# Patient Record
Sex: FEMALE | Race: OTHER | HISPANIC OR LATINO | Employment: FULL TIME | URBAN - METROPOLITAN AREA
[De-identification: names, ages, dates, MRNs, and addresses within clinical notes are randomized per-mention and may not be internally consistent; named-entity substitution may affect disease eponyms.]

---

## 2017-04-28 ENCOUNTER — TRANSCRIBE ORDERS (OUTPATIENT)
Dept: ADMINISTRATIVE | Facility: HOSPITAL | Age: 47
End: 2017-04-28

## 2017-04-28 DIAGNOSIS — M75.122 COMPLETE TEAR OF LEFT ROTATOR CUFF: Primary | ICD-10-CM

## 2017-05-03 ENCOUNTER — HOSPITAL ENCOUNTER (OUTPATIENT)
Dept: RADIOLOGY | Facility: HOSPITAL | Age: 47
Discharge: HOME | End: 2017-05-03
Attending: INTERNAL MEDICINE | Admitting: RADIOLOGY
Payer: COMMERCIAL

## 2017-05-03 DIAGNOSIS — M75.122 COMPLETE TEAR OF LEFT ROTATOR CUFF: ICD-10-CM

## 2017-05-03 PROCEDURE — 76881 US COMPL JOINT R-T W/IMG: CPT

## 2017-05-25 ENCOUNTER — HOSPITAL ENCOUNTER (OUTPATIENT)
Dept: RADIOLOGY | Facility: OTHER | Age: 47
Discharge: HOME | End: 2017-05-25
Payer: COMMERCIAL

## 2017-05-25 ENCOUNTER — ALLSCRIPTS OFFICE VISIT (OUTPATIENT)
Dept: OTHER | Facility: OTHER | Age: 47
End: 2017-05-25

## 2017-05-25 DIAGNOSIS — M25.512 PAIN IN LEFT SHOULDER: ICD-10-CM

## 2017-05-25 DIAGNOSIS — M54.12 RADICULOPATHY OF CERVICAL REGION: ICD-10-CM

## 2017-05-25 DIAGNOSIS — M54.2 CERVICALGIA: ICD-10-CM

## 2017-05-25 PROCEDURE — 72050 X-RAY EXAM NECK SPINE 4/5VWS: CPT

## 2017-05-25 PROCEDURE — 73030 X-RAY EXAM OF SHOULDER: CPT

## 2017-07-10 ENCOUNTER — ALLSCRIPTS OFFICE VISIT (OUTPATIENT)
Dept: OTHER | Facility: OTHER | Age: 47
End: 2017-07-10

## 2017-07-10 DIAGNOSIS — M54.12 RADICULOPATHY OF CERVICAL REGION: ICD-10-CM

## 2017-07-10 DIAGNOSIS — M54.16 RADICULOPATHY OF LUMBAR REGION: ICD-10-CM

## 2017-08-02 ENCOUNTER — GENERIC CONVERSION - ENCOUNTER (OUTPATIENT)
Dept: OTHER | Facility: OTHER | Age: 47
End: 2017-08-02

## 2017-08-02 ENCOUNTER — APPOINTMENT (OUTPATIENT)
Dept: PHYSICAL THERAPY | Facility: REHABILITATION | Age: 47
End: 2017-08-02
Payer: COMMERCIAL

## 2017-08-02 PROCEDURE — G8991 OTHER PT/OT GOAL STATUS: HCPCS | Performed by: PHYSICAL THERAPIST

## 2017-08-02 PROCEDURE — G8990 OTHER PT/OT CURRENT STATUS: HCPCS | Performed by: PHYSICAL THERAPIST

## 2017-08-02 PROCEDURE — 97110 THERAPEUTIC EXERCISES: CPT

## 2017-08-02 PROCEDURE — 97162 PT EVAL MOD COMPLEX 30 MIN: CPT

## 2017-08-14 ENCOUNTER — APPOINTMENT (OUTPATIENT)
Dept: PHYSICAL THERAPY | Facility: REHABILITATION | Age: 47
End: 2017-08-14
Payer: COMMERCIAL

## 2017-08-16 ENCOUNTER — APPOINTMENT (OUTPATIENT)
Dept: PHYSICAL THERAPY | Facility: REHABILITATION | Age: 47
End: 2017-08-16
Payer: COMMERCIAL

## 2017-08-16 ENCOUNTER — GENERIC CONVERSION - ENCOUNTER (OUTPATIENT)
Dept: OTHER | Facility: OTHER | Age: 47
End: 2017-08-16

## 2017-08-21 ENCOUNTER — APPOINTMENT (OUTPATIENT)
Dept: PHYSICAL THERAPY | Facility: REHABILITATION | Age: 47
End: 2017-08-21
Payer: COMMERCIAL

## 2017-08-23 ENCOUNTER — APPOINTMENT (OUTPATIENT)
Dept: PHYSICAL THERAPY | Facility: REHABILITATION | Age: 47
End: 2017-08-23
Payer: COMMERCIAL

## 2017-08-28 ENCOUNTER — APPOINTMENT (OUTPATIENT)
Dept: PHYSICAL THERAPY | Facility: REHABILITATION | Age: 47
End: 2017-08-28
Payer: COMMERCIAL

## 2017-08-28 ENCOUNTER — CLINICAL SUPPORT (OUTPATIENT)
Dept: OTHER | Facility: OTHER | Age: 47
End: 2017-08-28

## 2017-08-30 ENCOUNTER — APPOINTMENT (OUTPATIENT)
Dept: PHYSICAL THERAPY | Facility: REHABILITATION | Age: 47
End: 2017-08-30
Payer: COMMERCIAL

## 2017-10-02 ENCOUNTER — GENERIC CONVERSION - ENCOUNTER (OUTPATIENT)
Dept: OTHER | Facility: OTHER | Age: 47
End: 2017-10-02

## 2017-10-25 ENCOUNTER — GENERIC CONVERSION - ENCOUNTER (OUTPATIENT)
Dept: OTHER | Facility: OTHER | Age: 47
End: 2017-10-25

## 2017-12-06 ENCOUNTER — GENERIC CONVERSION - ENCOUNTER (OUTPATIENT)
Dept: OTHER | Facility: OTHER | Age: 47
End: 2017-12-06

## 2018-01-10 NOTE — MISCELLANEOUS
Message   Recorded as Task   Date: 10/25/2017 08:19 AM, Created By: Young West   Task Name: Follow Up   Assigned To: [facility]   Regarding Patient: Maciej Beck, Status: Removed   Comment:    Kira Crawley - 25 Oct 2017 8:19 AM     TASK CREATED    S/P L C3, C4, C5, C6 RFA with AS on 10/24/17  Kira Crawley - 25 Oct 2017 8:20 AM     TASK REMOVED  wrong patient entered  Active Problems    1  Back pain (724 5) (M54 9)   2  Cervicalgia (723 1) (M54 2)   3  Chronic migraine without aura (346 70) (G43 709)   4  Classic migraine with aura (346 00) (G43 109)   5  Greater trochanteric bursitis of left hip (726 5) (M70 62)   6  Hemiplegic migraine (346 30) (G43 409)   7  Left cervical radiculopathy (723 4) (M54 12)   8  Lumbar radiculopathy (724 4) (M54 16)   9  Myofascial pain (729 1) (M79 1)   10  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)   11  Shoulder pain, left (719 41) (M25 512)   12  Vitamin D deficiency (268 9) (E55 9)    Current Meds   1  CloNIDine HCl - 0 1 MG Oral Tablet; Therapy: 66UYW0773 to Recorded   2  Gabapentin 300 MG Oral Capsule; TAKE 1 CAPSULE 3 TIMES DAILY; Therapy: 44DLZ1796 to (Evaluate:76Qzf1744)  Requested for: 22YKU4807; Last   Rx:47Uug2852 Ordered   3  Ketorolac Tromethamine 10 MG Oral Tablet; TID prn, Take at onset of headaches; Therapy: 32XMT7210 to (Last Rx:22Mar2016)  Requested for: 22Mar2016 Ordered   4  Methocarbamol 500 MG Oral Tablet; take 1 tablet tid prn; Therapy: 60BNZ9310 to [phone])  Requested for: 99DQJ0783; Last   Rx:38Icc9317 Ordered   5  Vitamin D3 48751 UNIT Oral Capsule Recorded   6  Vyvanse 50 MG Oral Tablet Chewable; Therapy: 60UDJ8406 to Recorded    Allergies    1  No Known Drug Allergies    2   No Known Environmental Allergies    Signatures   Electronically signed by : Giuseppe Marcelo RN; Oct 25 2017  8:22AM EST                       (Author)

## 2018-01-13 VITALS
WEIGHT: 213.5 LBS | HEART RATE: 72 BPM | BODY MASS INDEX: 35.53 KG/M2 | DIASTOLIC BLOOD PRESSURE: 69 MMHG | SYSTOLIC BLOOD PRESSURE: 103 MMHG

## 2018-01-13 NOTE — MISCELLANEOUS
Message   Recorded as Task   Date: 10/24/2017 02:22 PM, Created By: Jamar Alcaraz   Task Name: Miscellaneous   Assigned To: [facility]   Regarding Patient: Jose Morris, Status: Active   Comment:    Stacy Bell - 24 Oct 2017 2:22 PM     TASK CREATED  pt is doing renovations at her home and cant find her mri scripts  and asking if they can be mailed to pt  please call pt back at [phone] - 24 Oct 2017 2:50 PM     TASK EDITED  **FYI**    S/W pt  Pt stated she needs MRI scripts for lumbar and cervical spine  Pt stated she will  scripts at [address] tomorrow  Advised pt of office hrs  Scripts put at   Ruthy Massa - 24 Oct 2017 3:36 PM     TASK REPLIED TO: Previously Assigned To Ruthy Massa                      The prescriptions reordered  Leonel Weldon - 24 Oct 2017 3:52 PM     TASK EDITED  RN already reprinted scripts that were ordered from 10/20/17 and placed at   Should the orders that were reordered be d/c'd so there is no confusion? Please advise  Ruthy Massa - 24 Oct 2017 4:39 PM     TASK REPLIED TO: Previously Assigned To Ruthy Massa                      I  do not think we need to the see any orders as this may DC the order completely and I do not want this prevented the patient from getting the images done        Active Problems    1  Back pain (724 5) (M54 9)   2  Cervicalgia (723 1) (M54 2)   3  Chronic migraine without aura (346 70) (G43 709)   4  Classic migraine with aura (346 00) (G43 109)   5  Greater trochanteric bursitis of left hip (726 5) (M70 62)   6  Hemiplegic migraine (346 30) (G43 409)   7  Left cervical radiculopathy (723 4) (M54 12)   8  Lumbar radiculopathy (724 4) (M54 16)   9  Myofascial pain (729 1) (M79 1)   10  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)   11  Shoulder pain, left (719 41) (M25 512)   12  Vitamin D deficiency (268 9) (E55 9)    Current Meds   1  CloNIDine HCl - 0 1 MG Oral Tablet;    Therapy: 29UUM5654 to Recorded   2  Gabapentin 300 MG Oral Capsule; TAKE 1 CAPSULE 3 TIMES DAILY; Therapy: 73OID5967 to (Evaluate:00Bjd7132)  Requested for: 90ORS0160; Last   Rx:44Qag5802 Ordered   3  Ketorolac Tromethamine 10 MG Oral Tablet; TID prn, Take at onset of headaches; Therapy: 74EIS2499 to (Last Rx:22Mar2016)  Requested for: 22Mar2016 Ordered   4  Methocarbamol 500 MG Oral Tablet; take 1 tablet tid prn; Therapy: 50ZFC1357 to (Mike Chavarria)  Requested for: 27LBN6713; Last   Rx:57Slk7717 Ordered   5  Vitamin D3 84087 UNIT Oral Capsule Recorded   6  Vyvanse 50 MG Oral Tablet Chewable; Therapy: 31OKJ5075 to Recorded    Allergies    1  No Known Drug Allergies    2   No Known Environmental Allergies    Signatures   Electronically signed by : Kobe Michel RN; Oct 25 2017  7:53AM EST                       (Author)

## 2018-01-14 VITALS
BODY MASS INDEX: 33.51 KG/M2 | WEIGHT: 213.5 LBS | SYSTOLIC BLOOD PRESSURE: 109 MMHG | HEART RATE: 74 BPM | DIASTOLIC BLOOD PRESSURE: 73 MMHG | TEMPERATURE: 99.4 F | HEIGHT: 67 IN

## 2018-01-15 NOTE — MISCELLANEOUS
Signatures   Electronically signed by : Brie Mabry DO; Aug 28 2017  4:07PM EST                       (Author)

## 2018-01-18 NOTE — MISCELLANEOUS
Message   Recorded as Task   Date: 10/25/2017 02:52 PM, Created By: Adia Hensley   Task Name: Miscellaneous   Assigned To: [facility]   Regarding Patient: Pamela Vazquez, Status: Active   Comment:    QuentincindyDayanara stephens - 25 Oct 2017 2:52 PM     TASK CREATED  Pt called stating she will  the script for the MRI on Friday  Pt can be reached at [phone]  Active Problems    1  Back pain (724 5) (M54 9)   2  Cervicalgia (723 1) (M54 2)   3  Chronic migraine without aura (346 70) (G43 709)   4  Classic migraine with aura (346 00) (G43 109)   5  Greater trochanteric bursitis of left hip (726 5) (M70 62)   6  Hemiplegic migraine (346 30) (G43 409)   7  Left cervical radiculopathy (723 4) (M54 12)   8  Lumbar radiculopathy (724 4) (M54 16)   9  Myofascial pain (729 1) (M79 1)   10  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)   11  Shoulder pain, left (719 41) (M25 512)   12  Vitamin D deficiency (268 9) (E55 9)    Current Meds   1  CloNIDine HCl - 0 1 MG Oral Tablet; Therapy: 99DDD8755 to Recorded   2  Gabapentin 300 MG Oral Capsule; TAKE 1 CAPSULE 3 TIMES DAILY; Therapy: 27ZMC7822 to (Evaluate:66Syp4305)  Requested for: 29AYU9632; Last   Rx:27Icn3523 Ordered   3  Ketorolac Tromethamine 10 MG Oral Tablet; TID prn, Take at onset of headaches; Therapy: 03UJH4353 to (Last Rx:22Mar2016)  Requested for: 22Mar2016 Ordered   4  Methocarbamol 500 MG Oral Tablet; take 1 tablet tid prn; Therapy: 41KFM3521 to (Sammy Hughes)  Requested for: 88XJT7710; Last   Rx:68Ccn8711 Ordered   5  Vitamin D3 56879 UNIT Oral Capsule Recorded   6  Vyvanse 50 MG Oral Tablet Chewable; Therapy: 42IVD1271 to Recorded    Allergies    1  No Known Drug Allergies    2   No Known Environmental Allergies    Signatures   Electronically signed by : Geri Hogue RN; Oct 25 2017  3:07PM EST                       (Author)

## 2018-01-22 VITALS
WEIGHT: 204 LBS | SYSTOLIC BLOOD PRESSURE: 116 MMHG | TEMPERATURE: 98.9 F | HEIGHT: 67 IN | DIASTOLIC BLOOD PRESSURE: 76 MMHG | BODY MASS INDEX: 32.02 KG/M2 | HEART RATE: 95 BPM

## 2018-01-23 NOTE — MISCELLANEOUS
Message   Recorded as Task   Date: 12/06/2017 08:06 AM, Created By: Sixto Chavarria   Task Name: Miscellaneous   Assigned To: Sixto Chavarria   Regarding Patient: Alexus Faulkner, Status: Active   CommentAston Ralph - 06 Dec 2017 8:06 AM     TASK CREATED  Patient missed her appt/ 12/6/17 @ 8 AM with Aldo  I called the patient and she stated that she came to the office 12/5/17 & was told her appt  was 12/6/17  She stated that she is going to a different physician because her appts  were changed 3 times  She could not take off of work today, but didn't call to say she would not be coming  Aldo Arango - 06 Dec 2017 10:18 AM     TASK REPLIED TO: Previously Assigned To Aldo Arango  Provider aware  Thank you  Active Problems    1  Back pain (724 5) (M54 9)   2  Cervicalgia (723 1) (M54 2)   3  Chronic migraine without aura (346 70) (G43 709)   4  Classic migraine with aura (346 00) (G43 109)   5  Greater trochanteric bursitis of left hip (726 5) (M70 62)   6  Hemiplegic migraine (346 30) (G43 409)   7  Left cervical radiculopathy (723 4) (M54 12)   8  Lumbar radiculopathy (724 4) (M54 16)   9  Myofascial pain (729 1) (M79 1)   10  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)   11  Shoulder pain, left (719 41) (M25 512)   12  Vitamin D deficiency (268 9) (E55 9)    Current Meds   1  CloNIDine HCl - 0 1 MG Oral Tablet; Therapy: 31HJZ4333 to Recorded   2  Gabapentin 300 MG Oral Capsule; TAKE 1 CAPSULE 3 TIMES DAILY; Therapy: 02KCX3739 to (Evaluate:51Ywp0013)  Requested for: 95PRN4387; Last   Rx:87Kxt6793 Ordered   3  Ketorolac Tromethamine 10 MG Oral Tablet; TID prn, Take at onset of headaches; Therapy: 75HJO5630 to (Last Rx:22Mar2016)  Requested for: 22Mar2016 Ordered   4  Methocarbamol 500 MG Oral Tablet; take 1 tablet tid prn; Therapy: 17WHX4019 to ([id number])  Requested for: 96OPQ9319; Last   Rx:27Ixk4622 Ordered   5  Vitamin D3 46973 UNIT Oral Capsule Recorded   6   Vyvanse 50 MG Oral Tablet Chewable; Therapy: 91KLT3185 to Recorded    Allergies    1  No Known Drug Allergies    2  No Known Environmental Allergies    Signatures   Electronically signed by :  Sherry Chavarria, ; Dec  6 2017 10:51AM EST                       (Author)

## 2018-04-04 RX ORDER — GABAPENTIN 300 MG/1
CAPSULE ORAL
Qty: 90 CAPSULE | Refills: 0 | OUTPATIENT
Start: 2018-04-04

## 2018-04-05 NOTE — TELEPHONE ENCOUNTER
Left MOM on pharmacy's # informing them that the patient has to call us for refills  Left c/b# for questions

## 2020-09-29 DIAGNOSIS — K90.9 INTESTINAL MALABSORPTION, UNSPECIFIED TYPE: Primary | ICD-10-CM

## 2020-09-29 RX ORDER — MULTIVITAMIN WITH FOLIC ACID 400 MCG
TABLET ORAL
Qty: 90 TABLET | Refills: 3 | Status: SHIPPED | OUTPATIENT
Start: 2020-09-29 | End: 2021-11-29

## 2021-01-06 ENCOUNTER — OFFICE VISIT (OUTPATIENT)
Dept: INTERNAL MEDICINE CLINIC | Facility: CLINIC | Age: 51
End: 2021-01-06
Payer: COMMERCIAL

## 2021-01-06 VITALS
WEIGHT: 176 LBS | OXYGEN SATURATION: 98 % | TEMPERATURE: 97.5 F | SYSTOLIC BLOOD PRESSURE: 113 MMHG | BODY MASS INDEX: 29.32 KG/M2 | DIASTOLIC BLOOD PRESSURE: 77 MMHG | HEIGHT: 65 IN | HEART RATE: 72 BPM

## 2021-01-06 DIAGNOSIS — K29.70 GASTRITIS, PRESENCE OF BLEEDING UNSPECIFIED, UNSPECIFIED CHRONICITY, UNSPECIFIED GASTRITIS TYPE: ICD-10-CM

## 2021-01-06 DIAGNOSIS — J45.40 MODERATE PERSISTENT ASTHMA WITHOUT COMPLICATION: ICD-10-CM

## 2021-01-06 DIAGNOSIS — K91.2 POSTOPERATIVE MALABSORPTION: ICD-10-CM

## 2021-01-06 DIAGNOSIS — G43.909 MIGRAINE WITHOUT STATUS MIGRAINOSUS, NOT INTRACTABLE, UNSPECIFIED MIGRAINE TYPE: ICD-10-CM

## 2021-01-06 DIAGNOSIS — Z23 ENCOUNTER FOR IMMUNIZATION: ICD-10-CM

## 2021-01-06 DIAGNOSIS — Z00.01 ENCOUNTER FOR GENERAL ADULT MEDICAL EXAMINATION WITH ABNORMAL FINDINGS: Primary | ICD-10-CM

## 2021-01-06 DIAGNOSIS — M70.62 TROCHANTERIC BURSITIS OF LEFT HIP: ICD-10-CM

## 2021-01-06 DIAGNOSIS — Z12.31 ENCOUNTER FOR SCREENING MAMMOGRAM FOR MALIGNANT NEOPLASM OF BREAST: ICD-10-CM

## 2021-01-06 PROCEDURE — 3725F SCREEN DEPRESSION PERFORMED: CPT | Performed by: INTERNAL MEDICINE

## 2021-01-06 PROCEDURE — 99396 PREV VISIT EST AGE 40-64: CPT | Performed by: INTERNAL MEDICINE

## 2021-01-06 PROCEDURE — 90471 IMMUNIZATION ADMIN: CPT

## 2021-01-06 PROCEDURE — 90715 TDAP VACCINE 7 YRS/> IM: CPT

## 2021-01-06 RX ORDER — BUTALBITAL, ACETAMINOPHEN AND CAFFEINE 50; 325; 40 MG/1; MG/1; MG/1
1 TABLET ORAL EVERY 4 HOURS PRN
COMMUNITY
End: 2021-01-06 | Stop reason: SDUPTHER

## 2021-01-06 RX ORDER — ALBUTEROL SULFATE 90 UG/1
2 AEROSOL, METERED RESPIRATORY (INHALATION) EVERY 6 HOURS PRN
COMMUNITY
End: 2022-04-12

## 2021-01-06 RX ORDER — METOCLOPRAMIDE 10 MG/1
10 TABLET ORAL DAILY PRN
Qty: 30 TABLET | Refills: 0 | Status: SHIPPED | OUTPATIENT
Start: 2021-01-06 | End: 2021-06-18

## 2021-01-06 RX ORDER — PANTOPRAZOLE SODIUM 40 MG/1
40 TABLET, DELAYED RELEASE ORAL
Qty: 30 TABLET | Refills: 5 | Status: SHIPPED | OUTPATIENT
Start: 2021-01-06 | End: 2021-06-15

## 2021-01-06 RX ORDER — ERGOCALCIFEROL 1.25 MG/1
50000 CAPSULE ORAL WEEKLY
COMMUNITY
End: 2021-01-18

## 2021-01-06 RX ORDER — LANOLIN ALCOHOL/MO/W.PET/CERES
CREAM (GRAM) TOPICAL DAILY
COMMUNITY

## 2021-01-06 RX ORDER — METOCLOPRAMIDE 10 MG/1
10 TABLET ORAL DAILY PRN
COMMUNITY
End: 2021-01-06 | Stop reason: SDUPTHER

## 2021-01-06 RX ORDER — BUTALBITAL, ACETAMINOPHEN AND CAFFEINE 50; 325; 40 MG/1; MG/1; MG/1
1 TABLET ORAL EVERY 6 HOURS PRN
Qty: 30 TABLET | Refills: 0 | Status: SHIPPED | OUTPATIENT
Start: 2021-01-06 | End: 2021-01-22 | Stop reason: ALTCHOICE

## 2021-01-06 RX ORDER — CLONIDINE HYDROCHLORIDE 0.1 MG/1
0.1 TABLET ORAL DAILY
COMMUNITY

## 2021-01-06 NOTE — PROGRESS NOTES
Assessment/Plan: This is 53-year-old lady with a history postoperative malabsorption  She also has some upper abdominal pain probably secondary to gastritis  I suggested that she follow a strict lean on also diet excluding caffeine and other ulcerogenic foods  She is going to follow-up with her surgeon tomorrow and discuss this further  Stool tests were also ordered as well as blood test   Tdap vaccine was given  1  Encounter for general adult medical examination with abnormal findings  -     Urinalysis with microscopic  -     Lipid panel; Future  -     TDAP VACCINE GREATER THAN OR EQUAL TO 8YO IM    2  Encounter for screening mammogram for malignant neoplasm of breast    3  Moderate persistent asthma without complication    4  Postoperative malabsorption  -     Vitamin B12; Future  -     Vitamin D 25 hydroxy; Future  -     Iron Saturation %; Future  -     CBC and differential; Future  -     Comprehensive metabolic panel; Future    5  Migraine without status migrainosus, not intractable, unspecified migraine type  -     butalbital-acetaminophen-caffeine (FIORICET,ESGIC) -40 mg per tablet; Take 1 tablet by mouth every 6 (six) hours as needed for headaches  -     metoclopramide (REGLAN) 10 mg tablet; Take 1 tablet (10 mg total) by mouth daily as needed (headache)    6  Gastritis, presence of bleeding unspecified, unspecified chronicity, unspecified gastritis type  -     Occult blood 1-3, stool; Future  -     H  pylori antigen, stool; Future  -     pantoprazole (PROTONIX) 40 mg tablet; Take 1 tablet (40 mg total) by mouth daily before breakfast    7  Trochanteric bursitis of left hip           1  Encounter for general adult medical examination with abnormal findings      2  Encounter for screening mammogram for malignant neoplasm of breast      3  Moderate persistent asthma without complication      4  Postoperative malabsorption      5   Migraine without status migrainosus, not intractable, unspecified migraine type             Subjective:      Patient ID: Qasim Delgado is a 52 y o  female  HPI    The following portions of the patient's history were reviewed and updated as appropriate: She  has a past medical history of Asthma, Gastroduodenitis, Intestinal malabsorption, Migraine, Pyonephrosis, UTI (urinary tract infection), and Vitamin D deficiency  She There are no active problems to display for this patient  She  has a past surgical history that includes Laparoscopic gastric banding (2005) and Gastric bypass (06/2008)  Her family history includes Thyroid disease in her mother  She  has no history on file for tobacco, alcohol, and drug  Current Outpatient Medications   Medication Sig Dispense Refill    albuterol (PROVENTIL HFA,VENTOLIN HFA) 90 mcg/act inhaler Inhale 2 puffs every 6 (six) hours as needed      CALCIUM CITRATE PO Take by mouth      cloNIDine (CATAPRES) 0 1 mg tablet Take 0 1 mg by mouth every 12 (twelve) hours      ergocalciferol (VITAMIN D2) 50,000 units Take 50,000 Units by mouth once a week      Multiple Vitamin (Daily-Isra) TABS TAKE 1 TABLET BY MOUTH EVERY DAY 90 tablet 3    Multiple Vitamins-Minerals (MULTIVITAMIN ADULT PO) Take by mouth      butalbital-acetaminophen-caffeine (FIORICET,ESGIC) -40 mg per tablet Take 1 tablet by mouth every 6 (six) hours as needed for headaches 30 tablet 0    lisdexamfetamine (VYVANSE) 50 MG capsule Take 50 mg by mouth every morning      metoclopramide (REGLAN) 10 mg tablet Take 1 tablet (10 mg total) by mouth daily as needed (headache) 30 tablet 0    pantoprazole (PROTONIX) 40 mg tablet Take 1 tablet (40 mg total) by mouth daily before breakfast 30 tablet 5    traMADol-acetaminophen (ULTRACET) 37 5-325 mg per tablet Take 1 tablet by mouth every 6 (six) hours as needed      vitamin B-12 (VITAMIN B-12) 1,000 mcg tablet Take by mouth daily       No current facility-administered medications for this visit        Current Outpatient Medications on File Prior to Visit   Medication Sig    albuterol (PROVENTIL HFA,VENTOLIN HFA) 90 mcg/act inhaler Inhale 2 puffs every 6 (six) hours as needed    CALCIUM CITRATE PO Take by mouth    cloNIDine (CATAPRES) 0 1 mg tablet Take 0 1 mg by mouth every 12 (twelve) hours    ergocalciferol (VITAMIN D2) 50,000 units Take 50,000 Units by mouth once a week    Multiple Vitamin (Daily-Isra) TABS TAKE 1 TABLET BY MOUTH EVERY DAY    Multiple Vitamins-Minerals (MULTIVITAMIN ADULT PO) Take by mouth    lisdexamfetamine (VYVANSE) 50 MG capsule Take 50 mg by mouth every morning    traMADol-acetaminophen (ULTRACET) 37 5-325 mg per tablet Take 1 tablet by mouth every 6 (six) hours as needed    vitamin B-12 (VITAMIN B-12) 1,000 mcg tablet Take by mouth daily    [DISCONTINUED] butalbital-acetaminophen-caffeine (FIORICET,ESGIC) -40 mg per tablet Take 1 tablet by mouth every 4 (four) hours as needed    [DISCONTINUED] metoclopramide (REGLAN) 10 mg tablet Take 10 mg by mouth daily as needed     No current facility-administered medications on file prior to visit  She has No Known Allergies       Review of Systems   Constitutional: Negative for appetite change, chills, fatigue and fever  HENT: Negative for sore throat and trouble swallowing  Eyes: Negative for redness  Respiratory: Negative for shortness of breath  Cardiovascular: Negative for chest pain and palpitations  Gastrointestinal: Positive for abdominal pain  Negative for constipation and diarrhea  Genitourinary: Negative for dysuria and hematuria  Musculoskeletal: Negative for back pain and neck pain  Left trochanteric pain   Skin: Negative for rash  Neurological: Negative for seizures, weakness and headaches  Hematological: Negative for adenopathy  Psychiatric/Behavioral: Negative for confusion  The patient is not nervous/anxious            Objective:      /77 (BP Location: Left arm, Patient Position: Sitting, Cuff Size: Standard)   Pulse 72   Temp 97 5 °F (36 4 °C) (Temporal)   Ht 5' 5" (1 651 m)   Wt 79 8 kg (176 lb)   SpO2 98%   BMI 29 29 kg/m²     No results found for this or any previous visit (from the past 1344 hour(s))  Physical Exam  Constitutional:       General: She is not in acute distress  Appearance: Normal appearance  HENT:      Head: Normocephalic and atraumatic  Nose: Nose normal       Mouth/Throat:      Mouth: Mucous membranes are moist    Eyes:      Extraocular Movements: Extraocular movements intact  Pupils: Pupils are equal, round, and reactive to light  Neck:      Musculoskeletal: Normal range of motion and neck supple  Cardiovascular:      Rate and Rhythm: Normal rate and regular rhythm  Pulses: Normal pulses  Heart sounds: Normal heart sounds  No murmur  No friction rub  Pulmonary:      Effort: Pulmonary effort is normal  No respiratory distress  Breath sounds: Normal breath sounds  No wheezing  Abdominal:      General: Abdomen is flat  Bowel sounds are normal  There is no distension  Palpations: Abdomen is soft  There is no mass  Tenderness: There is abdominal tenderness  There is no guarding  Musculoskeletal: Normal range of motion  General: Tenderness present  Neurological:      General: No focal deficit present  Mental Status: She is alert and oriented to person, place, and time  Mental status is at baseline  Cranial Nerves: No cranial nerve deficit     Psychiatric:         Mood and Affect: Mood normal          Behavior: Behavior normal

## 2021-01-06 NOTE — PATIENT INSTRUCTIONS
Migraine Headache   WHAT YOU NEED TO KNOW:   What is a migraine headache? A migraine is a severe headache  The pain can be so severe that it interferes with your daily activities  A migraine can last a few hours up to several days  The exact cause of migraines is not known  What can trigger a migraine headache? · Stress, eye strain, oversleeping, or not getting enough sleep    · Hormone changes in women from birth control pills, pregnancy, menopause, or during a monthly period    · Skipping meals, going too long without eating, or not drinking enough liquids    · Certain foods or drinks such as chocolate, hard cheese, red wine, or drinks that contain caffeine    · Foods that contain gluten, nitrates, MSG, or artificial sweeteners    · Sunlight, bright or flashing lights, loud noises, smoke, or strong smells    · Heat, humidity, or changes in the weather    What are the warning signs that a migraine headache is about to start? Warning signs usually start 15 to 60 minutes before the headache:  · Visual changes (auras), such as blurred vision, temporary blind or bright spots, lines, or hallucinations    · Unusual tiredness or frequent yawning    · Tingling in an arm or leg    What are the signs and symptoms of a migraine headache? A migraine headache usually begins as a dull ache around the eye or temple  The pain may get worse with movement  You may also have the following:  · Pain in your head that may increase to the point that you cannot do everyday activities    · Pain on one or both sides of your head    · Throbbing, pulsing, or pounding pain in your head    · Nausea and vomiting    · Sensitivity to light, noise, or smells    How is a migraine headache diagnosed? Your healthcare provider will ask questions about your headaches  Describe the pain and any other symptoms, such as nausea  Tell the provider if you think anything triggered the pain   The provider will also want to know what you ate and drank before the pain started  Tell the provider about any medical conditions you have or that run in your family  Include any recent stressors you have had  You may also need any of the following:  · A neurologic exam  is used to check how your pupils react to light  Your healthcare provider may check your memory, hand grasp, and balance  · CT or MRI pictures  may be taken of your brain  You may be given contrast liquid to help your brain show up better in the pictures  Tell the healthcare provider if you have ever had an allergic reaction to contrast liquid  Do not enter the MRI room with anything metal  Metal can cause serious injury  Tell the healthcare provider if you have any metal in or on your body  How is a migraine headache treated? Migraines cannot be cured  The goal of treatment is to reduce your symptoms  Take medicine as soon as you feel a migraine begin  · Prescription pain medicine  may be given  Do not wait until the pain is severe before you take your medicine  · Migraine medicines  are used to help prevent a migraine or stop it once it starts  · Antinausea medicine  may be given to calm your stomach and to help prevent vomiting  This medicine can also help relieve pain  What can I do to manage my symptoms? · Rest in a dark, quiet room  This will help decrease your pain  Sleep may also help relieve the pain  · Apply ice to decrease pain  Use an ice pack, or put crushed ice in a plastic bag  Cover the ice pack with a towel and place it on your head  Apply ice for 15 to 20 minutes every hour  · Apply heat to decrease pain and muscle spasms  Use a small towel dampened with warm water or a heating pad, or sit in a warm bath  Apply heat on the area for 20 to 30 minutes every 2 hours  You may alternate heat and ice  · Keep a migraine record  Write down when your migraines start and stop  Include your symptoms and what you were doing when a migraine began   Record what you ate or drank for 24 hours before the migraine started  Keep track of what you did to treat your migraine and if it worked  Bring the migraine record with you to visits with your healthcare provider  What can I do to prevent another migraine headache? · Do not smoke  Nicotine and other chemicals in cigarettes and cigars can trigger a migraine or make it worse  Ask your healthcare provider for information if you currently smoke and need help to quit  E-cigarettes or smokeless tobacco still contain nicotine  Talk to your healthcare provider before you use these products  · Do not drink alcohol  Alcohol can trigger a migraine  It can also keep medicines used to treat your migraines from working  · Get regular exercise  Exercise may help prevent migraines  Talk to your healthcare provider about the best exercise plan for you  Try to get at least 30 minutes of exercise on most days  · Manage stress  Stress may trigger a migraine  Learn new ways to relax, such as deep breathing  · Create a sleep schedule  Go to bed and get up at the same times each day  Do not watch television before bed  · Eat regular meals  Include healthy foods such as include fruit, vegetables, whole-grain breads, low-fat dairy products, beans, lean meat, and fish  Do not have food or drinks that trigger your migraines  When should I seek immediate care? · You have a headache that seems different or much worse than your usual migraine headache  · You have a severe headache with a fever or a stiff neck  · You have new problems with speech, vision, balance, or movement  · You feel like you are going to faint, you become confused, or you have a seizure  When should I contact my healthcare provider? · Your migraines interfere with your daily activities  · Your medicines or treatments stop working  · You have questions or concerns about your condition or care      CARE AGREEMENT:   You have the right to help plan your care  Learn about your health condition and how it may be treated  Discuss treatment options with your healthcare providers to decide what care you want to receive  You always have the right to refuse treatment  The above information is an  only  It is not intended as medical advice for individual conditions or treatments  Talk to your doctor, nurse or pharmacist before following any medical regimen to see if it is safe and effective for you  © Copyright [address] Information is for End User's use only and may not be sold, redistributed or otherwise used for commercial purposes   All illustrations and images included in CareNotes® are the copyrighted property of [employer]  or [address] [place] [address]

## 2021-01-07 ENCOUNTER — OFFICE VISIT (OUTPATIENT)
Dept: SURGERY | Facility: CLINIC | Age: 51
End: 2021-01-07
Payer: COMMERCIAL

## 2021-01-07 VITALS
HEIGHT: 65 IN | HEART RATE: 77 BPM | DIASTOLIC BLOOD PRESSURE: 70 MMHG | OXYGEN SATURATION: 98 % | TEMPERATURE: 97.3 F | WEIGHT: 181 LBS | SYSTOLIC BLOOD PRESSURE: 118 MMHG | BODY MASS INDEX: 30.16 KG/M2

## 2021-01-07 DIAGNOSIS — K21.9 GERD (GASTROESOPHAGEAL REFLUX DISEASE): Primary | ICD-10-CM

## 2021-01-07 DIAGNOSIS — Z80.0 FAMILY HISTORY OF COLON CANCER: ICD-10-CM

## 2021-01-07 DIAGNOSIS — Z98.84 HISTORY OF ROUX-EN-Y GASTRIC BYPASS: ICD-10-CM

## 2021-01-07 PROCEDURE — 3008F BODY MASS INDEX DOCD: CPT | Performed by: SURGERY

## 2021-01-07 PROCEDURE — 99244 OFF/OP CNSLTJ NEW/EST MOD 40: CPT | Performed by: SURGERY

## 2021-01-07 NOTE — H&P (VIEW-ONLY)
Assessment/Plan:  I told her to start taking Tums  I am going to schedule her for an EGD and colonoscopy  I am going to send the prescription for bowel prep  This will be done at [facility]  No problem-specific Assessment & Plan notes found for this encounter  Diagnoses and all orders for this visit:    GERD (gastroesophageal reflux disease)    Family history of colon cancer    History of [place]-en-Y gastric bypass          Subjective:      Patient ID: Mei Tristan is a 52 y o  female  62-year-old female patient came to my office with complaints of left-sided epigastric pain and reflux  Patient has history of gastric bypass  Prior to the gastric bypass she had a gastric band  I had done repair of internal hernia 4 years ago  She says she has to have chronic pain which improved after that surgery  She has been having acid reflux like symptoms for last few weeks  She has started taking Tums  She was prescribed pantoprazole by her primary care doctor yesterday however she has not started taking it yet  Patient also says that she has history of colon cancer in her family  She has not had the colonoscopy in many years  She does not complain of melenic stools or change in bowel habits  She has been taking Tums daily with limited improvement in her symptoms  The following portions of the patient's history were reviewed and updated as appropriate:   She  has a past medical history of Asthma, Gastroduodenitis, Intestinal malabsorption, Migraine, Pyonephrosis, UTI (urinary tract infection), and Vitamin D deficiency  She There are no active problems to display for this patient  She  has a past surgical history that includes Laparoscopic gastric banding (2005); Gastric bypass (06/2008); [place] tooth extraction; Dilation and curettage of uterus (2017); and Abdominal surgery    Her family history includes Breast cancer in her family; Colon cancer in her family; Diabetes in her family; Gallbladder disease in her family; Heart disease in her family; Hypertension in her family; Kidney cancer in her family; Lung cancer in her family; Ovarian cancer in her family; Prostate cancer in her family; Stomach cancer in her family; Thyroid disease in her family and mother  She  reports that she has been smoking  She has never used smokeless tobacco  No history on file for alcohol and drug  Current Outpatient Medications   Medication Sig Dispense Refill    albuterol (PROVENTIL HFA,VENTOLIN HFA) 90 mcg/act inhaler Inhale 2 puffs every 6 (six) hours as needed      butalbital-acetaminophen-caffeine (FIORICET,ESGIC) -40 mg per tablet Take 1 tablet by mouth every 6 (six) hours as needed for headaches 30 tablet 0    CALCIUM CITRATE PO Take by mouth      cloNIDine (CATAPRES) 0 1 mg tablet Take 0 1 mg by mouth every 12 (twelve) hours      ergocalciferol (VITAMIN D2) 50,000 units Take 50,000 Units by mouth once a week      lisdexamfetamine (VYVANSE) 50 MG capsule Take 50 mg by mouth every morning      metoclopramide (REGLAN) 10 mg tablet Take 1 tablet (10 mg total) by mouth daily as needed (headache) 30 tablet 0    Multiple Vitamin (Daily-Isra) TABS TAKE 1 TABLET BY MOUTH EVERY DAY 90 tablet 3    vitamin B-12 (VITAMIN B-12) 1,000 mcg tablet Take by mouth daily      pantoprazole (PROTONIX) 40 mg tablet Take 1 tablet (40 mg total) by mouth daily before breakfast (Patient not taking: Reported on 1/7/2021) 30 tablet 5     No current facility-administered medications for this visit        Current Outpatient Medications on File Prior to Visit   Medication Sig    albuterol (PROVENTIL HFA,VENTOLIN HFA) 90 mcg/act inhaler Inhale 2 puffs every 6 (six) hours as needed    butalbital-acetaminophen-caffeine (FIORICET,ESGIC) -40 mg per tablet Take 1 tablet by mouth every 6 (six) hours as needed for headaches    CALCIUM CITRATE PO Take by mouth    cloNIDine (CATAPRES) 0 1 mg tablet Take 0 1 mg by mouth every 12 (twelve) hours    ergocalciferol (VITAMIN D2) 50,000 units Take 50,000 Units by mouth once a week    lisdexamfetamine (VYVANSE) 50 MG capsule Take 50 mg by mouth every morning    metoclopramide (REGLAN) 10 mg tablet Take 1 tablet (10 mg total) by mouth daily as needed (headache)    Multiple Vitamin (Daily-Isra) TABS TAKE 1 TABLET BY MOUTH EVERY DAY    vitamin B-12 (VITAMIN B-12) 1,000 mcg tablet Take by mouth daily    [DISCONTINUED] Multiple Vitamins-Minerals (MULTIVITAMIN ADULT PO) Take by mouth    pantoprazole (PROTONIX) 40 mg tablet Take 1 tablet (40 mg total) by mouth daily before breakfast (Patient not taking: Reported on 1/7/2021)    [DISCONTINUED] traMADol-acetaminophen (ULTRACET) 37 5-325 mg per tablet Take 1 tablet by mouth every 6 (six) hours as needed     No current facility-administered medications on file prior to visit  She has No Known Allergies       Review of Systems   Constitutional: Negative  HENT: Negative  Eyes: Negative  Respiratory: Negative  Cardiovascular: Negative  Gastrointestinal: Positive for abdominal pain  Negative for abdominal distention, anal bleeding, blood in stool, constipation, diarrhea, nausea, rectal pain and vomiting  Acid reflux         Objective:      /70 (BP Location: Right arm, Patient Position: Sitting, Cuff Size: Adult)   Pulse 77   Temp (!) 97 3 °F (36 3 °C) (Tympanic)   Ht 5' 5" (1 651 m)   Wt 82 1 kg (181 lb)   SpO2 98%   BMI 30 12 kg/m²          Physical Exam  Constitutional:       Appearance: She is obese  HENT:      Head: Normocephalic and atraumatic  Mouth/Throat:      Mouth: Mucous membranes are moist    Eyes:      Pupils: Pupils are equal, round, and reactive to light  Cardiovascular:      Rate and Rhythm: Normal rate and regular rhythm  Heart sounds: Normal heart sounds  Pulmonary:      Effort: Pulmonary effort is normal       Breath sounds: Normal breath sounds  Abdominal:      General: Abdomen is flat   A surgical scar is present  Bowel sounds are normal       Palpations: Abdomen is soft  Tenderness: There is abdominal tenderness in the epigastric area  There is no guarding  Negative signs include Whitfield's sign  Hernia: No hernia is present  Skin:     General: Skin is warm  Neurological:      General: No focal deficit present  Mental Status: She is alert     Psychiatric:         Mood and Affect: Mood normal          Behavior: Behavior normal

## 2021-01-11 RX ORDER — POLYETHYLENE GLYCOL-3350 AND ELECTROLYTES 236; 6.74; 5.86; 2.97; 22.74 G/274.31G; G/274.31G; G/274.31G; G/274.31G; G/274.31G
POWDER, FOR SOLUTION ORAL
Qty: 4000 ML | Refills: 0 | Status: SHIPPED | OUTPATIENT
Start: 2021-01-11 | End: 2021-01-22 | Stop reason: ALTCHOICE

## 2021-01-15 LAB
25(OH)D3 SERPL-MCNC: 45 NG/ML (ref 30–100)
ALBUMIN SERPL-MCNC: 3.6 G/DL (ref 3.6–5.1)
ALBUMIN/GLOB SERPL: 1.3 (CALC) (ref 1–2.5)
ALP SERPL-CCNC: 75 U/L (ref 31–125)
ALT SERPL-CCNC: 12 U/L (ref 6–29)
APPEARANCE UR: CLEAR
AST SERPL-CCNC: 17 U/L (ref 10–35)
BACTERIA UR QL AUTO: NORMAL /HPF
BASOPHILS # BLD AUTO: 48 CELLS/UL (ref 0–200)
BASOPHILS NFR BLD AUTO: 1 %
BILIRUB SERPL-MCNC: 0.3 MG/DL (ref 0.2–1.2)
BILIRUB UR QL STRIP: NEGATIVE
BUN SERPL-MCNC: 11 MG/DL (ref 7–25)
BUN/CREAT SERPL: ABNORMAL (CALC) (ref 6–22)
CALCIUM SERPL-MCNC: 8.2 MG/DL (ref 8.6–10.2)
CHLORIDE SERPL-SCNC: 104 MMOL/L (ref 98–110)
CHOLEST SERPL-MCNC: 164 MG/DL
CHOLEST/HDLC SERPL: 2.6 (CALC)
CO2 SERPL-SCNC: 24 MMOL/L (ref 20–32)
COLOR UR: YELLOW
CREAT SERPL-MCNC: 0.62 MG/DL (ref 0.5–1.1)
EOSINOPHIL # BLD AUTO: 91 CELLS/UL (ref 15–500)
EOSINOPHIL NFR BLD AUTO: 1.9 %
ERYTHROCYTE [DISTWIDTH] IN BLOOD BY AUTOMATED COUNT: 15 % (ref 11–15)
FERRITIN SERPL-MCNC: 4 NG/ML (ref 16–232)
GLOBULIN SER CALC-MCNC: 2.8 G/DL (CALC) (ref 1.9–3.7)
GLUCOSE SERPL-MCNC: 91 MG/DL (ref 65–99)
GLUCOSE UR QL STRIP: NEGATIVE
HCT VFR BLD AUTO: 29.2 % (ref 35–45)
HDLC SERPL-MCNC: 64 MG/DL
HGB BLD-MCNC: 9.4 G/DL (ref 11.7–15.5)
HGB UR QL STRIP: NEGATIVE
HYALINE CASTS #/AREA URNS LPF: NORMAL /LPF
IRON SATN MFR SERPL: 11 % (CALC) (ref 16–45)
IRON SERPL-MCNC: 44 MCG/DL (ref 40–190)
KETONES UR QL STRIP: NEGATIVE
LDLC SERPL CALC-MCNC: 84 MG/DL (CALC)
LEUKOCYTE ESTERASE UR QL STRIP: NEGATIVE
LYMPHOCYTES # BLD AUTO: 1258 CELLS/UL (ref 850–3900)
LYMPHOCYTES NFR BLD AUTO: 26.2 %
MCH RBC QN AUTO: 25.8 PG (ref 27–33)
MCHC RBC AUTO-ENTMCNC: 32.2 G/DL (ref 32–36)
MCV RBC AUTO: 80 FL (ref 80–100)
METHYLMALONATE SERPL-SCNC: 111 NMOL/L (ref 87–318)
MONOCYTES # BLD AUTO: 365 CELLS/UL (ref 200–950)
MONOCYTES NFR BLD AUTO: 7.6 %
NEUTROPHILS # BLD AUTO: 3038 CELLS/UL (ref 1500–7800)
NEUTROPHILS NFR BLD AUTO: 63.3 %
NITRITE UR QL STRIP: NEGATIVE
NONHDLC SERPL-MCNC: 100 MG/DL (CALC)
PH UR STRIP: 8 [PH] (ref 5–8)
PLATELET # BLD AUTO: 287 THOUSAND/UL (ref 140–400)
PMV BLD REES-ECKER: 12.9 FL (ref 7.5–12.5)
POTASSIUM SERPL-SCNC: 4.6 MMOL/L (ref 3.5–5.3)
PROT SERPL-MCNC: 6.4 G/DL (ref 6.1–8.1)
PROT UR QL STRIP: NEGATIVE
RBC # BLD AUTO: 3.65 MILLION/UL (ref 3.8–5.1)
RBC #/AREA URNS HPF: NORMAL /HPF
SL AMB EGFR AFRICAN AMERICAN: 124 ML/MIN/1.73M2
SL AMB EGFR NON AFRICAN AMERICAN: 107 ML/MIN/1.73M2
SODIUM SERPL-SCNC: 138 MMOL/L (ref 135–146)
SP GR UR STRIP: 1.02 (ref 1–1.03)
SQUAMOUS #/AREA URNS HPF: NORMAL /HPF
TIBC SERPL-MCNC: 406 MCG/DL (CALC) (ref 250–450)
TRIGL SERPL-MCNC: 75 MG/DL
TSH SERPL-ACNC: 2.99 MIU/L
VIT A SERPL-MCNC: 52 MCG/DL (ref 38–98)
VIT B12 SERPL-MCNC: 297 PG/ML (ref 200–1100)
WBC # BLD AUTO: 4.8 THOUSAND/UL (ref 3.8–10.8)
WBC #/AREA URNS HPF: NORMAL /HPF

## 2021-01-17 DIAGNOSIS — N13.6 PYONEPHROSIS: ICD-10-CM

## 2021-01-18 ENCOUNTER — TELEPHONE (OUTPATIENT)
Dept: SURGERY | Facility: CLINIC | Age: 51
End: 2021-01-18

## 2021-01-18 RX ORDER — ERGOCALCIFEROL 1.25 MG/1
CAPSULE ORAL
Qty: 8 CAPSULE | Refills: 3 | Status: SHIPPED | OUTPATIENT
Start: 2021-01-18 | End: 2021-07-28

## 2021-01-18 NOTE — TELEPHONE ENCOUNTER
Pharmacist called about prescription for Gavilyte G  It's in back order right now, and would like to know if a suitable alternative

## 2021-01-19 ENCOUNTER — ANESTHESIA EVENT (OUTPATIENT)
Dept: GASTROENTEROLOGY | Facility: AMBULARY SURGERY CENTER | Age: 51
End: 2021-01-19

## 2021-01-21 RX ORDER — PROMETHAZINE HYDROCHLORIDE 25 MG/ML
12.5 INJECTION, SOLUTION INTRAMUSCULAR; INTRAVENOUS ONCE AS NEEDED
Status: CANCELLED | OUTPATIENT
Start: 2021-01-21

## 2021-01-21 RX ORDER — HYDROMORPHONE HCL/PF 1 MG/ML
0.5 SYRINGE (ML) INJECTION
Status: CANCELLED | OUTPATIENT
Start: 2021-01-21

## 2021-01-21 RX ORDER — ALBUTEROL SULFATE 2.5 MG/3ML
2.5 SOLUTION RESPIRATORY (INHALATION) ONCE AS NEEDED
Status: CANCELLED | OUTPATIENT
Start: 2021-01-21

## 2021-01-21 RX ORDER — LABETALOL 20 MG/4 ML (5 MG/ML) INTRAVENOUS SYRINGE
5
Status: CANCELLED | OUTPATIENT
Start: 2021-01-21

## 2021-01-21 RX ORDER — MEPERIDINE HYDROCHLORIDE 25 MG/ML
12.5 INJECTION INTRAMUSCULAR; INTRAVENOUS; SUBCUTANEOUS ONCE
Status: CANCELLED | OUTPATIENT
Start: 2021-01-21 | End: 2021-01-21

## 2021-01-21 RX ORDER — ONDANSETRON 2 MG/ML
4 INJECTION INTRAMUSCULAR; INTRAVENOUS ONCE AS NEEDED
Status: CANCELLED | OUTPATIENT
Start: 2021-01-21

## 2021-01-21 RX ORDER — FENTANYL CITRATE/PF 50 MCG/ML
25 SYRINGE (ML) INJECTION
Status: CANCELLED | OUTPATIENT
Start: 2021-01-21

## 2021-01-22 ENCOUNTER — HOSPITAL ENCOUNTER (OUTPATIENT)
Dept: GASTROENTEROLOGY | Facility: AMBULARY SURGERY CENTER | Age: 51
Setting detail: OUTPATIENT SURGERY
Discharge: HOME | End: 2021-01-22
Attending: SURGERY | Admitting: SURGERY
Payer: COMMERCIAL

## 2021-01-22 ENCOUNTER — ANESTHESIA (OUTPATIENT)
Dept: GASTROENTEROLOGY | Facility: AMBULARY SURGERY CENTER | Age: 51
End: 2021-01-22

## 2021-01-22 VITALS
BODY MASS INDEX: 30.16 KG/M2 | TEMPERATURE: 96.8 F | SYSTOLIC BLOOD PRESSURE: 94 MMHG | WEIGHT: 181 LBS | HEART RATE: 75 BPM | OXYGEN SATURATION: 100 % | DIASTOLIC BLOOD PRESSURE: 48 MMHG | HEIGHT: 65 IN | RESPIRATION RATE: 18 BRPM

## 2021-01-22 VITALS — HEART RATE: 81 BPM

## 2021-01-22 DIAGNOSIS — K21.9 GASTROESOPHAGEAL REFLUX DISEASE, UNSPECIFIED WHETHER ESOPHAGITIS PRESENT: ICD-10-CM

## 2021-01-22 DIAGNOSIS — Z80.0 FAMILY HISTORY OF COLON CANCER: ICD-10-CM

## 2021-01-22 PROCEDURE — 88305 TISSUE EXAM BY PATHOLOGIST: CPT | Performed by: PATHOLOGY

## 2021-01-22 PROCEDURE — G0121 COLON CA SCRN NOT HI RSK IND: HCPCS | Performed by: SURGERY

## 2021-01-22 PROCEDURE — 43239 EGD BIOPSY SINGLE/MULTIPLE: CPT | Performed by: SURGERY

## 2021-01-22 RX ORDER — PROPOFOL 10 MG/ML
INJECTION, EMULSION INTRAVENOUS CONTINUOUS PRN
Status: DISCONTINUED | OUTPATIENT
Start: 2021-01-22 | End: 2021-01-26

## 2021-01-22 RX ORDER — FENTANYL CITRATE 50 UG/ML
INJECTION, SOLUTION INTRAMUSCULAR; INTRAVENOUS AS NEEDED
Status: DISCONTINUED | OUTPATIENT
Start: 2021-01-22 | End: 2021-01-26

## 2021-01-22 RX ORDER — LIDOCAINE HYDROCHLORIDE 10 MG/ML
INJECTION, SOLUTION EPIDURAL; INFILTRATION; INTRACAUDAL; PERINEURAL AS NEEDED
Status: DISCONTINUED | OUTPATIENT
Start: 2021-01-22 | End: 2021-01-26

## 2021-01-22 RX ORDER — BUTALBITAL, ACETAMINOPHEN AND CAFFEINE 50; 325; 40 MG/1; MG/1; MG/1
1 TABLET ORAL EVERY 4 HOURS PRN
COMMUNITY
End: 2021-12-19

## 2021-01-22 RX ORDER — LIDOCAINE HYDROCHLORIDE 10 MG/ML
0.5 INJECTION, SOLUTION EPIDURAL; INFILTRATION; INTRACAUDAL; PERINEURAL ONCE AS NEEDED
Status: DISCONTINUED | OUTPATIENT
Start: 2021-01-22 | End: 2021-01-26 | Stop reason: HOSPADM

## 2021-01-22 RX ORDER — PROPOFOL 10 MG/ML
INJECTION, EMULSION INTRAVENOUS AS NEEDED
Status: DISCONTINUED | OUTPATIENT
Start: 2021-01-22 | End: 2021-01-26

## 2021-01-22 RX ORDER — SODIUM CHLORIDE, SODIUM LACTATE, POTASSIUM CHLORIDE, CALCIUM CHLORIDE 600; 310; 30; 20 MG/100ML; MG/100ML; MG/100ML; MG/100ML
125 INJECTION, SOLUTION INTRAVENOUS CONTINUOUS
Status: DISCONTINUED | OUTPATIENT
Start: 2021-01-22 | End: 2021-01-26 | Stop reason: HOSPADM

## 2021-01-22 RX ADMIN — SODIUM CHLORIDE, SODIUM LACTATE, POTASSIUM CHLORIDE, AND CALCIUM CHLORIDE 125 ML/HR: .6; .31; .03; .02 INJECTION, SOLUTION INTRAVENOUS at 13:47

## 2021-01-22 RX ADMIN — PROPOFOL 40 MG: 10 INJECTION, EMULSION INTRAVENOUS at 14:27

## 2021-01-22 RX ADMIN — PROPOFOL 150 MCG/KG/MIN: 10 INJECTION, EMULSION INTRAVENOUS at 14:02

## 2021-01-22 RX ADMIN — LIDOCAINE HYDROCHLORIDE 50 MG: 10 INJECTION, SOLUTION EPIDURAL; INFILTRATION; INTRACAUDAL at 14:01

## 2021-01-22 RX ADMIN — PROPOFOL 50 MG: 10 INJECTION, EMULSION INTRAVENOUS at 14:02

## 2021-01-22 RX ADMIN — FENTANYL CITRATE 25 MCG: 50 INJECTION, SOLUTION INTRAMUSCULAR; INTRAVENOUS at 14:01

## 2021-01-22 NOTE — ANESTHESIA POSTPROCEDURE EVALUATION
Post-Op Assessment Note    CV Status:  Stable    Pain management: adequate     Mental Status:  Sleepy   Hydration Status:  Euvolemic   PONV Controlled:  Controlled   Airway Patency:  Patent      Post Op Vitals Reviewed: Yes      Staff: CRNA         No complications documented      BP   90/50   Temp     Pulse  81   Resp   19   SpO2   98

## 2021-01-22 NOTE — ANESTHESIA PREPROCEDURE EVALUATION
Procedure:  COLONOSCOPY  EGD    Relevant Problems   No relevant active problems        Physical Exam    Airway    Mallampati score: II  TM Distance: >3 FB  Neck ROM: full     Dental       Cardiovascular      Pulmonary      Other Findings        Anesthesia Plan  ASA Score- 2     Anesthesia Type- IV sedation with anesthesia with ASA Monitors  Additional Monitors:   Airway Plan:     Comment: I have seen the patient and reviewed the history  Patient to receive IV sedation with full ASA monitors  Risks discussed with the patient, consent signed          Plan Factors-Exercise tolerance (METS): >4 METS  Chart reviewed  Patient summary reviewed  Induction- intravenous  Postoperative Plan-     Informed Consent- Anesthetic plan and risks discussed with patient  I personally reviewed this patient with the CRNA  Discussed and agreed on the Anesthesia Plan with the CRNA  Jessica Console

## 2021-01-22 NOTE — INTERVAL H&P NOTE
H&P reviewed  After examining the patient I find no changes in the patients condition since the H&P had been written      Vitals:    01/22/21 1333   BP: 108/58   Pulse: 66   Resp: 18   Temp: (!) 96 6 °F (35 9 °C)   SpO2: 97%

## 2021-03-15 ENCOUNTER — TELEPHONE (OUTPATIENT)
Dept: INTERNAL MEDICINE CLINIC | Facility: CLINIC | Age: 51
End: 2021-03-15

## 2021-05-03 ENCOUNTER — OFFICE VISIT (OUTPATIENT)
Dept: INTERNAL MEDICINE CLINIC | Facility: CLINIC | Age: 51
End: 2021-05-03
Payer: COMMERCIAL

## 2021-05-03 VITALS
OXYGEN SATURATION: 100 % | DIASTOLIC BLOOD PRESSURE: 72 MMHG | HEIGHT: 65 IN | HEART RATE: 73 BPM | TEMPERATURE: 97.8 F | WEIGHT: 187 LBS | BODY MASS INDEX: 31.16 KG/M2 | SYSTOLIC BLOOD PRESSURE: 110 MMHG

## 2021-05-03 DIAGNOSIS — K29.70 GASTRITIS, PRESENCE OF BLEEDING UNSPECIFIED, UNSPECIFIED CHRONICITY, UNSPECIFIED GASTRITIS TYPE: ICD-10-CM

## 2021-05-03 DIAGNOSIS — G89.29 CHRONIC LEFT-SIDED LOW BACK PAIN WITHOUT SCIATICA: ICD-10-CM

## 2021-05-03 DIAGNOSIS — G56.02 CARPAL TUNNEL SYNDROME OF LEFT WRIST: ICD-10-CM

## 2021-05-03 DIAGNOSIS — M54.50 CHRONIC LEFT-SIDED LOW BACK PAIN WITHOUT SCIATICA: ICD-10-CM

## 2021-05-03 DIAGNOSIS — J45.40 MODERATE PERSISTENT ASTHMA WITHOUT COMPLICATION: Primary | ICD-10-CM

## 2021-05-03 DIAGNOSIS — D50.9 IRON DEFICIENCY ANEMIA, UNSPECIFIED IRON DEFICIENCY ANEMIA TYPE: ICD-10-CM

## 2021-05-03 DIAGNOSIS — G43.909 MIGRAINE WITHOUT STATUS MIGRAINOSUS, NOT INTRACTABLE, UNSPECIFIED MIGRAINE TYPE: ICD-10-CM

## 2021-05-03 DIAGNOSIS — K91.2 POSTOPERATIVE MALABSORPTION: ICD-10-CM

## 2021-05-03 PROCEDURE — 99215 OFFICE O/P EST HI 40 MIN: CPT | Performed by: INTERNAL MEDICINE

## 2021-05-03 NOTE — PROGRESS NOTES
Assessment/Plan: This is a 63-year-old lady with a history of asthma migraines morbid obesity status post bariatric surgery  She complains of pain in left upper extremity that radiates to the arm  She also has left-sided lower back pain that has been persisting for a while  She has not taken the COVID vaccination and her  has got the 1st dose  It was strongly recommended that she get the vaccine because she will be traveling to [facility] next month  1  Moderate persistent asthma without complication  Comments:    Symptoms are controlled with albuterol  2  Postoperative malabsorption  Comments:    Labs were discussed she will continue supplementation  Orders:  -     CBC and differential; Future  -     Comprehensive metabolic panel; Future    3  Migraine without status migrainosus, not intractable, unspecified migraine type  Comments:    Continue p r n  medication  4  Gastritis, presence of bleeding unspecified, unspecified chronicity, unspecified gastritis type  Comments: This is stable and she had endoscopies done  5  Carpal tunnel syndrome of left wrist  -     EMG 1 Limb; Future    6  Chronic left-sided low back pain without sciatica  Comments:    X-rays were ordered and she was advised to take Tylenol p r n   Orders:  -     XR spine lumbar 2 or 3 views injury; Future; Expected date: 05/03/2021    7  Iron deficiency anemia, unspecified iron deficiency anemia type  -     Iron Saturation %; Future  -     Ferritin; Future           1  Moderate persistent asthma without complication      2  Postoperative malabsorption      3  Migraine without status migrainosus, not intractable, unspecified migraine type      4  Gastritis, presence of bleeding unspecified, unspecified chronicity, unspecified gastritis type             Subjective:      Patient ID: Steven Tia is a 52 y o  female        This is a 63-year-old lady with a history of asthma migraines morbid obesity status post bariatric surgery  She complains of pain in left upper extremity that radiates to the arm  She also has left-sided lower back pain that has been persisting for a while  The following portions of the patient's history were reviewed and updated as appropriate: She  has a past medical history of Asthma, Colon polyp, Gastroduodenitis, Intestinal malabsorption, Migraine, Pyonephrosis, UTI (urinary tract infection), and Vitamin D deficiency  She   Patient Active Problem List    Diagnosis Date Noted    Iron deficiency anemia 05/04/2021    Migraine without status migrainosus, not intractable 05/04/2021    Postoperative malabsorption 05/04/2021    Intestinal malabsorption     Gastroduodenitis     Asthma     Vitamin D deficiency      She  has a past surgical history that includes Laparoscopic gastric banding (2005); Gastric bypass (06/2008); [place] tooth extraction; Dilation and curettage of uterus (2017); Abdominal surgery; and Tubal ligation  Her family history includes Breast cancer in her family; Colon cancer in her family; Diabetes in her family; Gallbladder disease in her family; Heart disease in her family; Hypertension in her family; Kidney cancer in her family; Lung cancer in her family; Ovarian cancer in her family; Prostate cancer in her family; Stomach cancer in her family; Thyroid disease in her family and mother  She  reports that she has been smoking  She has never used smokeless tobacco  She reports current alcohol use  She reports that she does not use drugs    Current Outpatient Medications   Medication Sig Dispense Refill    albuterol (PROVENTIL HFA,VENTOLIN HFA) 90 mcg/act inhaler Inhale 2 puffs every 6 (six) hours as needed      CALCIUM CITRATE PO Take by mouth      cloNIDine (CATAPRES) 0 1 mg tablet Take 0 1 mg by mouth every 12 (twelve) hours      ergocalciferol (VITAMIN D2) 50,000 units 2 CAPSULES ONCE A WEEK 8 capsule 3    lisdexamfetamine (VYVANSE) 50 MG capsule Take 50 mg by mouth every morning      Multiple Vitamin (Daily-Isra) TABS TAKE 1 TABLET BY MOUTH EVERY DAY 90 tablet 3    vitamin B-12 (VITAMIN B-12) 1,000 mcg tablet Take by mouth daily      butalbital-acetaminophen-caffeine (FIORICET,ESGIC) -40 mg per tablet Take 1 tablet by mouth every 4 (four) hours as needed for headaches      metoclopramide (REGLAN) 10 mg tablet Take 1 tablet (10 mg total) by mouth daily as needed (headache) (Patient not taking: Reported on 5/3/2021) 30 tablet 0    pantoprazole (PROTONIX) 40 mg tablet Take 1 tablet (40 mg total) by mouth daily before breakfast (Patient not taking: Reported on 1/7/2021) 30 tablet 5    Plenvu 140 g SOLR        No current facility-administered medications for this visit  Current Outpatient Medications on File Prior to Visit   Medication Sig    albuterol (PROVENTIL HFA,VENTOLIN HFA) 90 mcg/act inhaler Inhale 2 puffs every 6 (six) hours as needed    CALCIUM CITRATE PO Take by mouth    cloNIDine (CATAPRES) 0 1 mg tablet Take 0 1 mg by mouth every 12 (twelve) hours    ergocalciferol (VITAMIN D2) 50,000 units 2 CAPSULES ONCE A WEEK    lisdexamfetamine (VYVANSE) 50 MG capsule Take 50 mg by mouth every morning    Multiple Vitamin (Daily-Isra) TABS TAKE 1 TABLET BY MOUTH EVERY DAY    vitamin B-12 (VITAMIN B-12) 1,000 mcg tablet Take by mouth daily    butalbital-acetaminophen-caffeine (FIORICET,ESGIC) -40 mg per tablet Take 1 tablet by mouth every 4 (four) hours as needed for headaches    metoclopramide (REGLAN) 10 mg tablet Take 1 tablet (10 mg total) by mouth daily as needed (headache) (Patient not taking: Reported on 5/3/2021)    pantoprazole (PROTONIX) 40 mg tablet Take 1 tablet (40 mg total) by mouth daily before breakfast (Patient not taking: Reported on 1/7/2021)    Plenvu 140 g SOLR      No current facility-administered medications on file prior to visit  She has No Known Allergies       Review of Systems   Constitutional: Negative for appetite change, chills, fatigue and fever  HENT: Negative for sore throat and trouble swallowing  Eyes: Negative for redness  Respiratory: Negative for shortness of breath  Cardiovascular: Negative for chest pain and palpitations  Gastrointestinal: Negative for abdominal pain, constipation and diarrhea  Endocrine: Negative for cold intolerance  Genitourinary: Negative for dysuria and hematuria  Musculoskeletal: Positive for back pain  Negative for neck pain  Left UE pain   Skin: Negative for rash  Allergic/Immunologic: Positive for environmental allergies  Neurological: Negative for seizures, weakness and headaches  Hematological: Negative for adenopathy  Psychiatric/Behavioral: Negative for confusion  The patient is not nervous/anxious  Objective:      /72 (BP Location: Left arm, Patient Position: Sitting, Cuff Size: Standard)   Pulse 73   Temp 97 8 °F (36 6 °C) (Temporal)   Ht 5' 5" (1 651 m)   Wt 84 8 kg (187 lb)   SpO2 100%   BMI 31 12 kg/m²     No results found for this or any previous visit (from the past 1344 hour(s))  Physical Exam  Constitutional:       General: She is not in acute distress  Appearance: Normal appearance  HENT:      Head: Normocephalic and atraumatic  Right Ear: Tympanic membrane normal       Left Ear: Tympanic membrane normal       Nose: Nose normal       Mouth/Throat:      Mouth: Mucous membranes are moist    Eyes:      Extraocular Movements: Extraocular movements intact  Pupils: Pupils are equal, round, and reactive to light  Neck:      Musculoskeletal: Normal range of motion and neck supple  Cardiovascular:      Rate and Rhythm: Normal rate and regular rhythm  Pulses: Normal pulses  Heart sounds: Normal heart sounds  No murmur  No friction rub  Pulmonary:      Effort: Pulmonary effort is normal  No respiratory distress  Breath sounds: Normal breath sounds  No wheezing     Abdominal:      General: Abdomen is flat  Bowel sounds are normal  There is no distension  Palpations: Abdomen is soft  There is no mass  Tenderness: There is no abdominal tenderness  There is no guarding  Musculoskeletal: Normal range of motion  General: Tenderness present  Comments: Tenderness in the left wrist carpal tunnel area was appreciated  Left lumbar area tenderness present  Skin:     General: Skin is warm and dry  Neurological:      General: No focal deficit present  Mental Status: She is alert and oriented to person, place, and time  Mental status is at baseline  Cranial Nerves: No cranial nerve deficit  Psychiatric:         Mood and Affect: Mood normal          Behavior: Behavior normal        BMI Counseling: Body mass index is 31 12 kg/m²  The BMI is above normal  Nutrition recommendations include reducing portion sizes, decreasing overall calorie intake and 3-5 servings of fruits/vegetables daily

## 2021-05-04 PROBLEM — K91.2 POSTOPERATIVE MALABSORPTION: Status: ACTIVE | Noted: 2021-05-04

## 2021-05-04 PROBLEM — D50.9 IRON DEFICIENCY ANEMIA: Status: ACTIVE | Noted: 2021-05-04

## 2021-05-04 PROBLEM — G43.909 MIGRAINE WITHOUT STATUS MIGRAINOSUS, NOT INTRACTABLE: Status: ACTIVE | Noted: 2021-05-04

## 2021-05-15 LAB
ALBUMIN SERPL-MCNC: 3.7 G/DL (ref 3.6–5.1)
ALBUMIN/GLOB SERPL: 1.4 (CALC) (ref 1–2.5)
ALP SERPL-CCNC: 67 U/L (ref 31–125)
ALT SERPL-CCNC: 12 U/L (ref 6–29)
AST SERPL-CCNC: 16 U/L (ref 10–35)
BASOPHILS # BLD AUTO: 41 CELLS/UL (ref 0–200)
BASOPHILS NFR BLD AUTO: 0.8 %
BILIRUB SERPL-MCNC: 0.4 MG/DL (ref 0.2–1.2)
BUN SERPL-MCNC: 8 MG/DL (ref 7–25)
BUN/CREAT SERPL: NORMAL (CALC) (ref 6–22)
CALCIUM SERPL-MCNC: 8.6 MG/DL (ref 8.6–10.2)
CHLORIDE SERPL-SCNC: 106 MMOL/L (ref 98–110)
CO2 SERPL-SCNC: 29 MMOL/L (ref 20–32)
CREAT SERPL-MCNC: 0.69 MG/DL (ref 0.5–1.1)
EOSINOPHIL # BLD AUTO: 61 CELLS/UL (ref 15–500)
EOSINOPHIL NFR BLD AUTO: 1.2 %
ERYTHROCYTE [DISTWIDTH] IN BLOOD BY AUTOMATED COUNT: 14.2 % (ref 11–15)
FERRITIN SERPL-MCNC: 4 NG/ML (ref 16–232)
GLOBULIN SER CALC-MCNC: 2.6 G/DL (CALC) (ref 1.9–3.7)
GLUCOSE SERPL-MCNC: 88 MG/DL (ref 65–99)
HCT VFR BLD AUTO: 30.7 % (ref 35–45)
HGB BLD-MCNC: 9.2 G/DL (ref 11.7–15.5)
IRON SATN MFR SERPL: 6 % (CALC) (ref 16–45)
IRON SERPL-MCNC: 29 MCG/DL (ref 40–190)
LYMPHOCYTES # BLD AUTO: 1790 CELLS/UL (ref 850–3900)
LYMPHOCYTES NFR BLD AUTO: 35.1 %
MCH RBC QN AUTO: 24.1 PG (ref 27–33)
MCHC RBC AUTO-ENTMCNC: 30 G/DL (ref 32–36)
MCV RBC AUTO: 80.4 FL (ref 80–100)
MONOCYTES # BLD AUTO: 485 CELLS/UL (ref 200–950)
MONOCYTES NFR BLD AUTO: 9.5 %
NEUTROPHILS # BLD AUTO: 2723 CELLS/UL (ref 1500–7800)
NEUTROPHILS NFR BLD AUTO: 53.4 %
PLATELET # BLD AUTO: 306 THOUSAND/UL (ref 140–400)
PMV BLD REES-ECKER: 12.3 FL (ref 7.5–12.5)
POTASSIUM SERPL-SCNC: 4.5 MMOL/L (ref 3.5–5.3)
PROT SERPL-MCNC: 6.3 G/DL (ref 6.1–8.1)
RBC # BLD AUTO: 3.82 MILLION/UL (ref 3.8–5.1)
SL AMB EGFR AFRICAN AMERICAN: 119 ML/MIN/1.73M2
SL AMB EGFR NON AFRICAN AMERICAN: 103 ML/MIN/1.73M2
SODIUM SERPL-SCNC: 138 MMOL/L (ref 135–146)
TIBC SERPL-MCNC: 463 MCG/DL (CALC) (ref 250–450)
WBC # BLD AUTO: 5.1 THOUSAND/UL (ref 3.8–10.8)

## 2021-06-04 ENCOUNTER — TELEPHONE (OUTPATIENT)
Dept: INTERNAL MEDICINE CLINIC | Facility: CLINIC | Age: 51
End: 2021-06-04

## 2021-06-04 DIAGNOSIS — N30.00 ACUTE CYSTITIS WITHOUT HEMATURIA: Primary | ICD-10-CM

## 2021-06-04 NOTE — TELEPHONE ENCOUNTER
She is having kidney pain, made an appt  For Monday  Would like to know if you can order tests so she can have them done on Saturday to see if she has an infection

## 2021-06-14 ENCOUNTER — APPOINTMENT (EMERGENCY)
Dept: RADIOLOGY | Facility: HOSPITAL | Age: 51
DRG: 872 | End: 2021-06-14
Payer: COMMERCIAL

## 2021-06-14 ENCOUNTER — HOSPITAL ENCOUNTER (INPATIENT)
Facility: HOSPITAL | Age: 51
LOS: 2 days | Discharge: HOME | DRG: 872 | End: 2021-06-17
Attending: EMERGENCY MEDICINE | Admitting: INTERNAL MEDICINE
Payer: COMMERCIAL

## 2021-06-14 DIAGNOSIS — R07.9 CHEST PAIN: ICD-10-CM

## 2021-06-14 DIAGNOSIS — K29.90 GASTRODUODENITIS: ICD-10-CM

## 2021-06-14 DIAGNOSIS — N12 PYELONEPHRITIS: Primary | ICD-10-CM

## 2021-06-14 DIAGNOSIS — D50.9 IRON DEFICIENCY ANEMIA, UNSPECIFIED IRON DEFICIENCY ANEMIA TYPE: ICD-10-CM

## 2021-06-14 DIAGNOSIS — R10.9 BILATERAL FLANK PAIN: ICD-10-CM

## 2021-06-14 DIAGNOSIS — R00.0 TACHYCARDIA: ICD-10-CM

## 2021-06-14 LAB
ALBUMIN SERPL BCP-MCNC: 3.4 G/DL (ref 3.5–5)
ALP SERPL-CCNC: 87 U/L (ref 46–116)
ALT SERPL W P-5'-P-CCNC: 14 U/L (ref 12–78)
ANION GAP SERPL CALCULATED.3IONS-SCNC: 7 MMOL/L (ref 4–13)
AST SERPL W P-5'-P-CCNC: 11 U/L (ref 5–45)
BASOPHILS # BLD AUTO: 0.03 THOUSANDS/ÂΜL (ref 0–0.1)
BASOPHILS NFR BLD AUTO: 0 % (ref 0–1)
BILIRUB SERPL-MCNC: 0.52 MG/DL (ref 0.2–1)
BUN SERPL-MCNC: 7 MG/DL (ref 5–25)
CALCIUM ALBUM COR SERPL-MCNC: 9.4 MG/DL (ref 8.3–10.1)
CALCIUM SERPL-MCNC: 8.9 MG/DL (ref 8.3–10.1)
CHLORIDE SERPL-SCNC: 104 MMOL/L (ref 100–108)
CO2 SERPL-SCNC: 21 MMOL/L (ref 21–32)
CREAT SERPL-MCNC: 0.7 MG/DL (ref 0.6–1.3)
EOSINOPHIL # BLD AUTO: 0 THOUSAND/ÂΜL (ref 0–0.61)
EOSINOPHIL NFR BLD AUTO: 0 % (ref 0–6)
ERYTHROCYTE [DISTWIDTH] IN BLOOD BY AUTOMATED COUNT: 15.9 % (ref 11.6–15.1)
GFR SERPL CREATININE-BSD FRML MDRD: 103 ML/MIN/1.73SQ M
GLUCOSE SERPL-MCNC: 129 MG/DL (ref 65–140)
HCG SERPL QL: NEGATIVE
HCT VFR BLD AUTO: 30.3 % (ref 34.8–46.1)
HGB BLD-MCNC: 9.4 G/DL (ref 11.5–15.4)
IMM GRANULOCYTES # BLD AUTO: 0.13 THOUSAND/UL (ref 0–0.2)
IMM GRANULOCYTES NFR BLD AUTO: 1 % (ref 0–2)
LACTATE SERPL-SCNC: 1.4 MMOL/L (ref 0.5–2)
LYMPHOCYTES # BLD AUTO: 1.05 THOUSANDS/ÂΜL (ref 0.6–4.47)
LYMPHOCYTES NFR BLD AUTO: 6 % (ref 14–44)
MCH RBC QN AUTO: 23.7 PG (ref 26.8–34.3)
MCHC RBC AUTO-ENTMCNC: 31 G/DL (ref 31.4–37.4)
MCV RBC AUTO: 77 FL (ref 82–98)
MONOCYTES # BLD AUTO: 0.97 THOUSAND/ÂΜL (ref 0.17–1.22)
MONOCYTES NFR BLD AUTO: 5 % (ref 4–12)
NEUTROPHILS # BLD AUTO: 16.19 THOUSANDS/ÂΜL (ref 1.85–7.62)
NEUTS SEG NFR BLD AUTO: 88 % (ref 43–75)
NRBC BLD AUTO-RTO: 0 /100 WBCS
PLATELET # BLD AUTO: 254 THOUSANDS/UL (ref 149–390)
PMV BLD AUTO: 11.4 FL (ref 8.9–12.7)
POTASSIUM SERPL-SCNC: 3.6 MMOL/L (ref 3.5–5.3)
PROCALCITONIN SERPL-MCNC: <0.05 NG/ML
PROT SERPL-MCNC: 7.5 G/DL (ref 6.4–8.2)
RBC # BLD AUTO: 3.96 MILLION/UL (ref 3.81–5.12)
SODIUM SERPL-SCNC: 132 MMOL/L (ref 136–145)
TROPONIN I SERPL-MCNC: <0.02 NG/ML
WBC # BLD AUTO: 18.37 THOUSAND/UL (ref 4.31–10.16)

## 2021-06-14 PROCEDURE — 93005 ELECTROCARDIOGRAM TRACING: CPT

## 2021-06-14 PROCEDURE — 71275 CT ANGIOGRAPHY CHEST: CPT

## 2021-06-14 PROCEDURE — 80053 COMPREHEN METABOLIC PANEL: CPT | Performed by: EMERGENCY MEDICINE

## 2021-06-14 PROCEDURE — 84484 ASSAY OF TROPONIN QUANT: CPT | Performed by: EMERGENCY MEDICINE

## 2021-06-14 PROCEDURE — 74174 CTA ABD&PLVS W/CONTRAST: CPT

## 2021-06-14 PROCEDURE — 99285 EMERGENCY DEPT VISIT HI MDM: CPT | Performed by: EMERGENCY MEDICINE

## 2021-06-14 PROCEDURE — 71045 X-RAY EXAM CHEST 1 VIEW: CPT

## 2021-06-14 PROCEDURE — 96375 TX/PRO/DX INJ NEW DRUG ADDON: CPT

## 2021-06-14 PROCEDURE — 85025 COMPLETE CBC W/AUTO DIFF WBC: CPT | Performed by: EMERGENCY MEDICINE

## 2021-06-14 PROCEDURE — 36415 COLL VENOUS BLD VENIPUNCTURE: CPT

## 2021-06-14 PROCEDURE — 83605 ASSAY OF LACTIC ACID: CPT | Performed by: EMERGENCY MEDICINE

## 2021-06-14 PROCEDURE — 87040 BLOOD CULTURE FOR BACTERIA: CPT | Performed by: EMERGENCY MEDICINE

## 2021-06-14 PROCEDURE — 96365 THER/PROPH/DIAG IV INF INIT: CPT

## 2021-06-14 PROCEDURE — 99285 EMERGENCY DEPT VISIT HI MDM: CPT

## 2021-06-14 PROCEDURE — 84145 PROCALCITONIN (PCT): CPT | Performed by: EMERGENCY MEDICINE

## 2021-06-14 PROCEDURE — 84703 CHORIONIC GONADOTROPIN ASSAY: CPT | Performed by: EMERGENCY MEDICINE

## 2021-06-14 RX ORDER — SODIUM CHLORIDE, SODIUM GLUCONATE, SODIUM ACETATE, POTASSIUM CHLORIDE, MAGNESIUM CHLORIDE, SODIUM PHOSPHATE, DIBASIC, AND POTASSIUM PHOSPHATE .53; .5; .37; .037; .03; .012; .00082 G/100ML; G/100ML; G/100ML; G/100ML; G/100ML; G/100ML; G/100ML
1000 INJECTION, SOLUTION INTRAVENOUS ONCE
Status: COMPLETED | OUTPATIENT
Start: 2021-06-14 | End: 2021-06-14

## 2021-06-14 RX ORDER — HYDROMORPHONE HCL/PF 1 MG/ML
0.5 SYRINGE (ML) INJECTION ONCE
Status: COMPLETED | OUTPATIENT
Start: 2021-06-14 | End: 2021-06-14

## 2021-06-14 RX ADMIN — IOHEXOL 100 ML: 350 INJECTION, SOLUTION INTRAVENOUS at 23:39

## 2021-06-14 RX ADMIN — SODIUM CHLORIDE, SODIUM GLUCONATE, SODIUM ACETATE, POTASSIUM CHLORIDE, MAGNESIUM CHLORIDE, SODIUM PHOSPHATE, DIBASIC, AND POTASSIUM PHOSPHATE 1000 ML: .53; .5; .37; .037; .03; .012; .00082 INJECTION, SOLUTION INTRAVENOUS at 22:17

## 2021-06-14 RX ADMIN — HYDROMORPHONE HYDROCHLORIDE 0.5 MG: 1 INJECTION, SOLUTION INTRAMUSCULAR; INTRAVENOUS; SUBCUTANEOUS at 22:58

## 2021-06-15 PROBLEM — A41.9 SEPSIS (HCC): Status: ACTIVE | Noted: 2021-06-15

## 2021-06-15 PROBLEM — I95.9 HYPOTENSION: Status: ACTIVE | Noted: 2021-06-15

## 2021-06-15 PROBLEM — N12 PYELONEPHRITIS: Status: ACTIVE | Noted: 2021-06-15

## 2021-06-15 LAB
ANION GAP SERPL CALCULATED.3IONS-SCNC: 6 MMOL/L (ref 4–13)
ATRIAL RATE: 109 BPM
BACTERIA UR QL AUTO: ABNORMAL /HPF
BASOPHILS # BLD AUTO: 0.03 THOUSANDS/ÂΜL (ref 0–0.1)
BASOPHILS NFR BLD AUTO: 0 % (ref 0–1)
BILIRUB UR QL STRIP: NEGATIVE
BUN SERPL-MCNC: 7 MG/DL (ref 5–25)
CALCIUM SERPL-MCNC: 7.9 MG/DL (ref 8.3–10.1)
CHLORIDE SERPL-SCNC: 106 MMOL/L (ref 100–108)
CLARITY UR: CLEAR
CLARITY, POC: NORMAL
CO2 SERPL-SCNC: 25 MMOL/L (ref 21–32)
COLOR UR: YELLOW
COLOR, POC: YELLOW
CREAT SERPL-MCNC: 0.62 MG/DL (ref 0.6–1.3)
EOSINOPHIL # BLD AUTO: 0.01 THOUSAND/ÂΜL (ref 0–0.61)
EOSINOPHIL NFR BLD AUTO: 0 % (ref 0–6)
ERYTHROCYTE [DISTWIDTH] IN BLOOD BY AUTOMATED COUNT: 16.1 % (ref 11.6–15.1)
GFR SERPL CREATININE-BSD FRML MDRD: 107 ML/MIN/1.73SQ M
GLUCOSE SERPL-MCNC: 114 MG/DL (ref 65–140)
GLUCOSE UR STRIP-MCNC: NEGATIVE MG/DL
HCT VFR BLD AUTO: 26.7 % (ref 34.8–46.1)
HGB BLD-MCNC: 8 G/DL (ref 11.5–15.4)
HGB UR QL STRIP.AUTO: ABNORMAL
HYALINE CASTS #/AREA URNS LPF: ABNORMAL /LPF
IMM GRANULOCYTES # BLD AUTO: 0.08 THOUSAND/UL (ref 0–0.2)
IMM GRANULOCYTES NFR BLD AUTO: 1 % (ref 0–2)
KETONES UR STRIP-MCNC: ABNORMAL MG/DL
LEUKOCYTE ESTERASE UR QL STRIP: ABNORMAL
LYMPHOCYTES # BLD AUTO: 1.25 THOUSANDS/ÂΜL (ref 0.6–4.47)
LYMPHOCYTES NFR BLD AUTO: 8 % (ref 14–44)
MCH RBC QN AUTO: 23.3 PG (ref 26.8–34.3)
MCHC RBC AUTO-ENTMCNC: 30 G/DL (ref 31.4–37.4)
MCV RBC AUTO: 78 FL (ref 82–98)
MONOCYTES # BLD AUTO: 0.94 THOUSAND/ÂΜL (ref 0.17–1.22)
MONOCYTES NFR BLD AUTO: 6 % (ref 4–12)
NEUTROPHILS # BLD AUTO: 14.01 THOUSANDS/ÂΜL (ref 1.85–7.62)
NEUTS SEG NFR BLD AUTO: 85 % (ref 43–75)
NITRITE UR QL STRIP: NEGATIVE
NON-SQ EPI CELLS URNS QL MICRO: ABNORMAL /HPF
NRBC BLD AUTO-RTO: 0 /100 WBCS
P AXIS: 62 DEGREES
PH UR STRIP.AUTO: 7 [PH] (ref 4.5–8)
PLATELET # BLD AUTO: 191 THOUSANDS/UL (ref 149–390)
PMV BLD AUTO: 11.5 FL (ref 8.9–12.7)
POTASSIUM SERPL-SCNC: 3.5 MMOL/L (ref 3.5–5.3)
PR INTERVAL: 124 MS
PROCALCITONIN SERPL-MCNC: 0.06 NG/ML
PROT UR STRIP-MCNC: NEGATIVE MG/DL
QRS AXIS: 61 DEGREES
QRSD INTERVAL: 82 MS
QT INTERVAL: 308 MS
QTC INTERVAL: 414 MS
RBC # BLD AUTO: 3.43 MILLION/UL (ref 3.81–5.12)
RBC #/AREA URNS AUTO: ABNORMAL /HPF
SODIUM SERPL-SCNC: 137 MMOL/L (ref 136–145)
SP GR UR STRIP.AUTO: 1.01 (ref 1–1.03)
T WAVE AXIS: 36 DEGREES
UROBILINOGEN UR QL STRIP.AUTO: 1 E.U./DL
VENTRICULAR RATE: 109 BPM
WBC # BLD AUTO: 16.32 THOUSAND/UL (ref 4.31–10.16)
WBC #/AREA URNS AUTO: ABNORMAL /HPF

## 2021-06-15 PROCEDURE — 93010 ELECTROCARDIOGRAM REPORT: CPT | Performed by: INTERNAL MEDICINE

## 2021-06-15 PROCEDURE — 87077 CULTURE AEROBIC IDENTIFY: CPT

## 2021-06-15 PROCEDURE — 81001 URINALYSIS AUTO W/SCOPE: CPT

## 2021-06-15 PROCEDURE — 36415 COLL VENOUS BLD VENIPUNCTURE: CPT | Performed by: STUDENT IN AN ORGANIZED HEALTH CARE EDUCATION/TRAINING PROGRAM

## 2021-06-15 PROCEDURE — 96375 TX/PRO/DX INJ NEW DRUG ADDON: CPT

## 2021-06-15 PROCEDURE — 96376 TX/PRO/DX INJ SAME DRUG ADON: CPT

## 2021-06-15 PROCEDURE — 80048 BASIC METABOLIC PNL TOTAL CA: CPT | Performed by: STUDENT IN AN ORGANIZED HEALTH CARE EDUCATION/TRAINING PROGRAM

## 2021-06-15 PROCEDURE — 84145 PROCALCITONIN (PCT): CPT | Performed by: STUDENT IN AN ORGANIZED HEALTH CARE EDUCATION/TRAINING PROGRAM

## 2021-06-15 PROCEDURE — 85025 COMPLETE CBC W/AUTO DIFF WBC: CPT | Performed by: STUDENT IN AN ORGANIZED HEALTH CARE EDUCATION/TRAINING PROGRAM

## 2021-06-15 PROCEDURE — 87086 URINE CULTURE/COLONY COUNT: CPT

## 2021-06-15 PROCEDURE — 87186 SC STD MICRODIL/AGAR DIL: CPT

## 2021-06-15 RX ORDER — SODIUM CHLORIDE 9 MG/ML
100 INJECTION, SOLUTION INTRAVENOUS CONTINUOUS
Status: DISPENSED | OUTPATIENT
Start: 2021-06-15 | End: 2021-06-17

## 2021-06-15 RX ORDER — LISDEXAMFETAMINE DIMESYLATE 50 MG/1
50 CAPSULE ORAL EVERY MORNING
Status: DISCONTINUED | OUTPATIENT
Start: 2021-06-15 | End: 2021-06-15

## 2021-06-15 RX ORDER — HYDROMORPHONE HCL/PF 1 MG/ML
0.5 SYRINGE (ML) INJECTION ONCE
Status: COMPLETED | OUTPATIENT
Start: 2021-06-15 | End: 2021-06-15

## 2021-06-15 RX ORDER — METOCLOPRAMIDE 10 MG/1
10 TABLET ORAL DAILY PRN
Status: DISCONTINUED | OUTPATIENT
Start: 2021-06-15 | End: 2021-06-15

## 2021-06-15 RX ORDER — BUTALBITAL, ACETAMINOPHEN AND CAFFEINE 50; 325; 40 MG/1; MG/1; MG/1
1 TABLET ORAL EVERY 4 HOURS PRN
Status: DISCONTINUED | OUTPATIENT
Start: 2021-06-15 | End: 2021-06-15

## 2021-06-15 RX ORDER — ONDANSETRON 2 MG/ML
4 INJECTION INTRAMUSCULAR; INTRAVENOUS EVERY 8 HOURS PRN
Status: DISCONTINUED | OUTPATIENT
Start: 2021-06-15 | End: 2021-06-17 | Stop reason: HOSPADM

## 2021-06-15 RX ORDER — ACETAMINOPHEN 325 MG/1
975 TABLET ORAL ONCE
Status: COMPLETED | OUTPATIENT
Start: 2021-06-15 | End: 2021-06-15

## 2021-06-15 RX ORDER — METOCLOPRAMIDE 10 MG/1
10 TABLET ORAL DAILY PRN
Status: DISCONTINUED | OUTPATIENT
Start: 2021-06-15 | End: 2021-06-17 | Stop reason: HOSPADM

## 2021-06-15 RX ORDER — ALBUTEROL SULFATE 90 UG/1
2 AEROSOL, METERED RESPIRATORY (INHALATION) EVERY 6 HOURS PRN
Status: DISCONTINUED | OUTPATIENT
Start: 2021-06-15 | End: 2021-06-17 | Stop reason: HOSPADM

## 2021-06-15 RX ORDER — ACETAMINOPHEN 325 MG/1
650 TABLET ORAL EVERY 6 HOURS PRN
Status: DISCONTINUED | OUTPATIENT
Start: 2021-06-15 | End: 2021-06-17 | Stop reason: HOSPADM

## 2021-06-15 RX ORDER — CALCIUM CARBONATE 500(1250)
1 TABLET ORAL
Status: DISCONTINUED | OUTPATIENT
Start: 2021-06-15 | End: 2021-06-17 | Stop reason: HOSPADM

## 2021-06-15 RX ORDER — HEPARIN SODIUM 5000 [USP'U]/ML
5000 INJECTION, SOLUTION INTRAVENOUS; SUBCUTANEOUS EVERY 8 HOURS SCHEDULED
Status: DISCONTINUED | OUTPATIENT
Start: 2021-06-15 | End: 2021-06-17 | Stop reason: HOSPADM

## 2021-06-15 RX ORDER — BUTALBITAL, ACETAMINOPHEN AND CAFFEINE 50; 325; 40 MG/1; MG/1; MG/1
1 TABLET ORAL EVERY 4 HOURS PRN
Status: DISCONTINUED | OUTPATIENT
Start: 2021-06-15 | End: 2021-06-17 | Stop reason: HOSPADM

## 2021-06-15 RX ORDER — KETOROLAC TROMETHAMINE 30 MG/ML
15 INJECTION, SOLUTION INTRAMUSCULAR; INTRAVENOUS EVERY 6 HOURS PRN
Status: DISPENSED | OUTPATIENT
Start: 2021-06-15 | End: 2021-06-17

## 2021-06-15 RX ORDER — NICOTINE 21 MG/24HR
1 PATCH, TRANSDERMAL 24 HOURS TRANSDERMAL DAILY
Status: DISCONTINUED | OUTPATIENT
Start: 2021-06-15 | End: 2021-06-17 | Stop reason: HOSPADM

## 2021-06-15 RX ADMIN — SODIUM CHLORIDE 1000 ML: 0.9 INJECTION, SOLUTION INTRAVENOUS at 21:11

## 2021-06-15 RX ADMIN — CEFTRIAXONE 1000 MG: 1 INJECTION, POWDER, FOR SOLUTION INTRAMUSCULAR; INTRAVENOUS at 01:36

## 2021-06-15 RX ADMIN — HEPARIN SODIUM 5000 UNITS: 5000 INJECTION INTRAVENOUS; SUBCUTANEOUS at 21:28

## 2021-06-15 RX ADMIN — HYDROMORPHONE HYDROCHLORIDE 0.5 MG: 1 INJECTION, SOLUTION INTRAMUSCULAR; INTRAVENOUS; SUBCUTANEOUS at 01:36

## 2021-06-15 RX ADMIN — SODIUM CHLORIDE 1000 ML: 0.9 INJECTION, SOLUTION INTRAVENOUS at 03:30

## 2021-06-15 RX ADMIN — SODIUM CHLORIDE 1000 ML: 0.9 INJECTION, SOLUTION INTRAVENOUS at 07:06

## 2021-06-15 RX ADMIN — CYANOCOBALAMIN TAB 500 MCG 1000 MCG: 500 TAB at 09:20

## 2021-06-15 RX ADMIN — BUTALBITAL, ACETAMINOPHEN, AND CAFFEINE 1 TABLET: 50; 325; 40 TABLET ORAL at 14:44

## 2021-06-15 RX ADMIN — ACETAMINOPHEN 975 MG: 325 TABLET, FILM COATED ORAL at 01:36

## 2021-06-15 RX ADMIN — ACETAMINOPHEN 650 MG: 325 TABLET, FILM COATED ORAL at 07:56

## 2021-06-15 RX ADMIN — CALCIUM 1 TABLET: 500 TABLET ORAL at 07:56

## 2021-06-15 RX ADMIN — METOCLOPRAMIDE 10 MG: 10 TABLET ORAL at 08:02

## 2021-06-15 RX ADMIN — KETOROLAC TROMETHAMINE 15 MG: 30 INJECTION, SOLUTION INTRAMUSCULAR; INTRAVENOUS at 18:17

## 2021-06-15 RX ADMIN — Medication 1 TABLET: at 09:20

## 2021-06-15 RX ADMIN — HEPARIN SODIUM 5000 UNITS: 5000 INJECTION INTRAVENOUS; SUBCUTANEOUS at 05:25

## 2021-06-15 RX ADMIN — HEPARIN SODIUM 5000 UNITS: 5000 INJECTION INTRAVENOUS; SUBCUTANEOUS at 13:28

## 2021-06-15 RX ADMIN — SODIUM CHLORIDE 100 ML/HR: 0.9 INJECTION, SOLUTION INTRAVENOUS at 05:24

## 2021-06-15 NOTE — ASSESSMENT & PLAN NOTE
POA   Tachycardia along with elevated WBC count  UA positive  Normal lactic acid and procalcitonin  CTA dissection protocol showed Multiple ill-defined hypoattenuating regions in the right kidney with associated perinephric stranding suspicious for acute pyelonephritis  Mild diffuse right ureteral wall thickening consistent with ureteritis likely related to ascending infection  Correlate with urinalysis  Plan:  · Continue IV ceftriaxone  Pending urine culture sensitivities  Plan to transition to oral antibiotic  · Status post IV fluid bolus x3 and will continue maintenance fluids  · Blood culture - no growth at 48 hours  · Urine culture - E coli, sensitivity pending  · Hold on to home clonidine given hypotensive episode in ER  · Continue to monitor fever curve daily

## 2021-06-15 NOTE — ED NOTES
SOD @ bedside for pt eval d/t BP  Pt gcs 15, 1L NS hung at this time  Will continue to monitor       Julia Pizano RN  06/15/21 4663

## 2021-06-15 NOTE — ED ATTENDING ATTESTATION
6/14/2021  ICompa DO, saw and evaluated the patient  I have discussed the patient with the resident/non-physician practitioner and agree with the resident's/non-physician practitioner's findings, Plan of Care, and MDM as documented in the resident's/non-physician practitioner's note, except where noted  All available labs and Radiology studies were reviewed  I was present for key portions of any procedure(s) performed by the resident/non-physician practitioner and I was immediately available to provide assistance  At this point I agree with the current assessment done in the Emergency Department  I have conducted an independent evaluation of this patient a history and physical is as follows:    57-year-old female presents with abdominal pain, chest pain and back pain  Patient states 2 weeks ago started with foul-smelling urine  Now having abdominal pain that radiates into her back  Complains of nausea, denies shortness of breath no diarrhea  Denies dysuria  On exam-no acute distress heart tachycardic, no respiratory distress, abdomen soft with diffuse tenderness    Plan-CT chest abdomen, check septic labs, IV fluids and reassess    ED Course         Critical Care Time  Procedures

## 2021-06-15 NOTE — UTILIZATION REVIEW
Initial Clinical Review    Admission: Date/Time/Statement:   Admission Orders: Observation 6/15 @ 0136 and changed to Inpatient on 6/15 @ 1246  Pt requiring continued stay for UTI/ Iv antibiotics/ + Urine cultures  Ordered        06/15/21 1246  Inpatient Admission  Once         06/15/21 0136  Place in Observation  Once                   Orders Placed This Encounter   Procedures    Inpatient Admission     Standing Status:   Standing     Number of Occurrences:   1     Order Specific Question:   Level of Care     Answer:   Level 2 Stepdown / HOT [14]     Order Specific Question:   Estimated length of stay     Answer:   More than 2 Midnights     Order Specific Question:   Certification     Answer:   I certify that inpatient services are medically necessary for this patient for a duration of greater than two midnights  See H&P and MD Progress Notes for additional information about the patient's course of treatment  ED Arrival Information     Expected Arrival Acuity    - 6/14/2021 20:32 Urgent         Means of arrival Escorted by Service Admission type    [address] [facility] Urgent         Arrival complaint    flank pain,chest pain         Chief Complaint   Patient presents with    Chest Pain     Patient complaining of pain all over; reports kidney pain for two weeks; also reports stabbing chest pain that started today; also reports neck pain at times too    Back Pain     Initial Presentation:   56y Female to ED presents abdominal pain and back pain x1 wk  Also c/o chest pain this am  PMH for Asthma, vitamin-D deficiency, UTI, migraines, anemia  IN ED noted to be tachycardic with BP of 175 systolic, then episode of hypotension with BP 39D to 60H systolic  Given IVF bolus and improved ot BP in high 90s  UA positive  CTA dissection protocol showed Multiple ill-defined hypoattenuating regions in the right kidney with associated perinephric stranding suspicious for acute pyelonephritis   Mild diffuse right ureteral wall thickening consistent with ureteritis likely related to ascending infection   Correlate with urinalysis  Given IV antibiotics x in ED  Admit Observation level of care for Sepsis, Hypotension, Pyelonephritis  Continue Iv antibiotics and IVFs  F/u bld and urine cultures  Hold on to home clonidine given hypotensive episode in ER  Monitor renal function and fever curve  6/15 Progress notes; Continue Iv antibiotics and IVFs  Bld and urine culture pending  C/o lightheadedness, nausea and bilateral flank pain right more than left  Zofran prn for nausea  Pain control  Date: 6/16   Day 2:     ED Triage Vitals [06/14/21 2036]   Temperature Pulse Respirations Blood Pressure SpO2   99 9 °F (37 7 °C) (!) 138 18 107/76 97 %      Temp Source Heart Rate Source Patient Position - Orthostatic VS BP Location FiO2 (%)   Oral Monitor Sitting Left arm --      Pain Score       Worst Possible Pain          Wt Readings from Last 1 Encounters:   06/14/21 83 9 kg (185 lb)     Additional Vital Signs:   06/15/21 0645  --  74  18  71/39  Abnormal   52  99 %  None (Room air)  Lying   06/15/21 0345  --  78  18  99/52  62  99 %  None (Room air)  Lying   06/15/21 0318  98 2 °F (36 8 °C)  72  16  76/41  Abnormal   52  98 %  None (Room air)       Pertinent Labs/Diagnostic Test Results:   6/14 CTA dissection protocol chest/abd/pelvis - 1  No evidence of aortic dissection, intramural hematoma, or aortic aneurysm  2   Multiple ill-defined hypoattenuating regions in the right kidney with associated perinephric stranding suspicious for acute pyelonephritis  3   Mild diffuse right ureteral wall thickening consistent with ureteritis likely related to ascending infection  Correlate with urinalysis    4   Hepatic steatosis          Results from last 7 days   Lab Units 06/15/21  0522 06/14/21 2048   WBC Thousand/uL 16 32* 18 37*   HEMOGLOBIN g/dL 8 0* 9 4*   HEMATOCRIT % 26 7* 30 3*   PLATELETS Thousands/uL 191 254   NEUTROS ABS Thousands/µL 14 01* 16 19*         Results from last 7 days   Lab Units 06/15/21  0522 06/14/21  2048   SODIUM mmol/L 137 132*   POTASSIUM mmol/L 3 5 3 6   CHLORIDE mmol/L 106 104   CO2 mmol/L 25 21   ANION GAP mmol/L 6 7   BUN mg/dL 7 7   CREATININE mg/dL 0 62 0 70   EGFR ml/min/1 73sq m 107 103   CALCIUM mg/dL 7 9* 8 9     Results from last 7 days   Lab Units 06/14/21  2048   AST U/L 11   ALT U/L 14   ALK PHOS U/L 87   TOTAL PROTEIN g/dL 7 5   ALBUMIN g/dL 3 4*   TOTAL BILIRUBIN mg/dL 0 52         Results from last 7 days   Lab Units 06/15/21  0522 06/14/21  2048   GLUCOSE RANDOM mg/dL 114 129       Results from last 7 days   Lab Units 06/14/21 2048   TROPONIN I ng/mL <0 02       Results from last 7 days   Lab Units 06/15/21  0524 06/14/21  2217   PROCALCITONIN ng/ml 0 06 <0 05     Results from last 7 days   Lab Units 06/14/21  2217   LACTIC ACID mmol/L 1 4       Results from last 7 days   Lab Units 06/15/21  0058 06/15/21  0054   CLARITY UA  cloudy Clear   COLOR UA  yellow Yellow   SPEC GRAV UA   --  1 015   PH UA   --  7 0   GLUCOSE UA mg/dl  --  Negative   KETONES UA mg/dl  --  15 (1+)*   BLOOD UA   --  Moderate*   PROTEIN UA mg/dl  --  Negative   NITRITE UA   --  Negative   BILIRUBIN UA   --  Negative   UROBILINOGEN UA E U /dl  --  1 0   LEUKOCYTES UA   --  Trace*   WBC UA /hpf  --  20-30*   RBC UA /hpf  --  10-20*   BACTERIA UA /hpf  --  Innumerable*   EPITHELIAL CELLS WET PREP /hpf  --  None Seen     ED Treatment:   Medication Administration from 06/14/2021 2032 to 06/15/2021 0756       Date/Time Order Dose Route Action     06/14/2021 2217 multi-electrolyte (ISOLYTE-S PH 7 4) bolus 1,000 mL 1,000 mL Intravenous New Bag     06/14/2021 2258 HYDROmorphone (DILAUDID) injection 0 5 mg 0 5 mg Intravenous Given     06/14/2021 2339 iohexol (OMNIPAQUE) 350 MG/ML injection (SINGLE-DOSE) 100 mL 100 mL Intravenous Given     06/15/2021 0136 ceftriaxone (ROCEPHIN) 1 g/50 mL in dextrose IVPB 1,000 mg Intravenous New Bag 06/15/2021 0136 acetaminophen (TYLENOL) tablet 975 mg 975 mg Oral Given     06/15/2021 0136 HYDROmorphone (DILAUDID) injection 0 5 mg 0 5 mg Intravenous Given     06/15/2021 0524 sodium chloride 0 9 % infusion 100 mL/hr Intravenous New Bag     06/15/2021 0525 heparin (porcine) subcutaneous injection 5,000 Units 5,000 Units Subcutaneous Given     06/15/2021 0330 sodium chloride 0 9 % bolus 1,000 mL 1,000 mL Intravenous New Bag     06/15/2021 0706 sodium chloride 0 9 % bolus 1,000 mL 1,000 mL Intravenous New Bag        Past Medical History:   Diagnosis Date    Asthma     Colon polyp     Gastroduodenitis     Intestinal malabsorption     Migraine     Pyonephrosis     UTI (urinary tract infection)     Vitamin D deficiency      Present on Admission:   Sepsis ([facility] [address] )   Pyelonephritis   Migraine without status migrainosus, not intractable   Asthma   Vitamin D deficiency   Hypotension      Admitting Diagnosis: Chest pain [R07 9]  Age/Sex: 52 y o  female     Admission Orders:  Scheduled Medications:  calcium carbonate, 1 tablet, Oral, Daily With Breakfast  [START ON 6/16/2021] cefTRIAXone, 1,000 mg, Intravenous, Q24H  vitamin B-12, 1,000 mcg, Oral, Daily  heparin (porcine), 5,000 Units, Subcutaneous, Q8H [address]  multivitamin-minerals, 1 tablet, Oral, Daily  nicotine, 1 patch, Transdermal, Daily    ceftriaxone (ROCEPHIN) 1 g/50 mL in dextrose IVPB   Dose: 1,000 mg  Freq:  Once Route: IV  Last Dose: Stopped (06/15/21 0306)  Start: 06/15/21 0130 End: 06/15/21 0306    Continuous IV Infusions:  sodium chloride, 100 mL/hr, Intravenous, Continuous      PRN Meds:  acetaminophen, 650 mg, Oral, Q6H PRN  albuterol, 2 puff, Inhalation, Q6H PRN  butalbital-acetaminophen-caffeine, 1 tablet, Oral, Q4H PRN  ketorolac, 15 mg, Intravenous, Q6H PRN  metoclopramide, 10 mg, Oral, Daily PRN  ondansetron, 4 mg, Intravenous, Q8H PRN      Bld culture x2    Network Utilization Review Department  ATTENTION: Please call with any questions or concerns to [phone] and carefully listen to the prompts so that you are directed to the right person  All voicemails are confidential   Ubaldo Meade all requests for admission clinical reviews, approved or denied determinations and any other requests to dedicated fax number below belonging to the campus where the patient is receiving treatment   List of dedicated fax numbers for the Facilities:  [address] DENIALS (Administrative/Medical Necessity) [phone]   [address] (Maternity/NICU/Pediatrics) [phone]   [address] [place] [address] [id number] [place] [address] [address] [place] [address] [phone]

## 2021-06-15 NOTE — QUICK NOTE
The patient is a 52years old female admitted early this morning for sepsis secondary to urinary tract infection  Blood cultures obtained and pending, urine culture pending  Patient received  2 L of Plasmalyte and currently has normal saline maintenance running at 100 cc an hour  She was started on ceftriaxone  Since admission, patient persistently hypotensive  Patient seen and examined at bedside  She is alert, oriented and conversant, has normal mentation  Complains of lightheadedness, nausea and bilateral flank pain right more than left  Blood pressure personally checked 109/67, MAP 81  Pulse in the 80s  Physical exam unremarkable apart from bilateral flanks tenderness  Patient tells me that her blood pressure runs low at baseline with systolic blood pressure is in the 80s  Per chart review, on recent outpatient office visits -118/70's  She takes clonidine with Vyvanse about 4 times a week  Has history of UTIs  [facility]    Plan:  Continue with maintenance IV fluids, consider additional bolus  Continue with ceftriaxone for now, consider broadening antibiotics if continues to be hypotensive  Follow-up on blood cultures and urine cultures  Added Zofran as needed for nausea  Tylenol and Toradol as needed for pain    Monitor vital signs closely

## 2021-06-15 NOTE — ED PROVIDER NOTES
History  Chief Complaint   Patient presents with    Chest Pain     Patient complaining of pain all over; reports kidney pain for two weeks; also reports stabbing chest pain that started today; also reports neck pain at times too    Back Pain     61-year-old female past medical history significant for gastric bypass surgery that presents ED today with bilateral flank pain  Patient said that she has had bilateral flank pain for the last 2 weeks  She also had associated nausea this pain  Today she states that she has had sharp intermittent chest pain as well which prompted her to come to the ED for evaluation  She has not had much to eat secondary to the nausea  She talks of a remote history of kidney infection in the past that she states she was in the hospital for  She describes the chest pain as a sharp substernal chest pain that lasts a few seconds and does not radiate anywhere  Denies any dysuria or hematuria  Prior to Admission Medications   Prescriptions Last Dose Informant Patient Reported? Taking?    CALCIUM CITRATE PO Not Taking at Unknown time Self Yes No   Sig: Take by mouth   Multiple Vitamin (Daily-Isra) TABS 2021 at Unknown time Self No Yes   Sig: TAKE 1 TABLET BY MOUTH EVERY DAY   albuterol (PROVENTIL HFA,VENTOLIN HFA) 90 mcg/act inhaler 2021 at Unknown time Self Yes Yes   Sig: Inhale 2 puffs every 6 (six) hours as needed   butalbital-acetaminophen-caffeine (FIORICET,ESGIC) -40 mg per tablet 2021 at Unknown time Self Yes Yes   Sig: Take 1 tablet by mouth every 4 (four) hours as needed for headaches   cloNIDine (CATAPRES) 0 1 mg tablet 2021 at Unknown time Self Yes Yes   Sig: Take 0 1 mg by mouth every 12 (twelve) hours   ergocalciferol (VITAMIN D2) 50,000 units Past Week at Unknown time Self No Yes   Si CAPSULES ONCE A WEEK   lisdexamfetamine (VYVANSE) 50 MG capsule 2021 at Unknown time Self Yes Yes   Sig: Take 50 mg by mouth every morning metoclopramide (REGLAN) 10 mg tablet Past Week at Unknown time Self No Yes   Sig: Take 1 tablet (10 mg total) by mouth daily as needed (headache)   vitamin B-12 (VITAMIN B-12) 1,000 mcg tablet 6/14/2021 at Unknown time Self Yes Yes   Sig: Take by mouth daily      Facility-Administered Medications: None       Past Medical History:   Diagnosis Date    Asthma     Colon polyp     Gastroduodenitis     Intestinal malabsorption     Migraine     Pyonephrosis     UTI (urinary tract infection)     Vitamin D deficiency        Past Surgical History:   Procedure Laterality Date    ABDOMINAL SURGERY      Twisted bowel and internal hernia    DILATION AND CURETTAGE OF UTERUS  2017    GASTRIC BYPASS  06/2008    LAPAROSCOPIC GASTRIC BANDING  2005    TUBAL LIGATION      WISDOM TOOTH EXTRACTION         Family History   Problem Relation Age of Onset    Thyroid disease Mother     Heart disease Family     Diabetes Family     Hypertension Family     Breast cancer Family     Colon cancer Family     Prostate cancer Family     Gallbladder disease Family     Stomach cancer Family     Ovarian cancer Family     Kidney cancer Family     Lung cancer Family     Thyroid disease Family      I have reviewed and agree with the history as documented  E-Cigarette/Vaping    E-Cigarette Use Never User      E-Cigarette/Vaping Substances     Social History     Tobacco Use    Smoking status: Current Some Day Smoker    Smokeless tobacco: Never Used    Tobacco comment: Social smoker per [facility] chart   Vaping Use    Vaping Use: Never used   Substance Use Topics    Alcohol use: Yes    Drug use: Never        Review of Systems   Constitutional: Negative for chills and fever  HENT: Negative for hearing loss  Eyes: Negative for visual disturbance  Respiratory: Negative for shortness of breath  Cardiovascular: Positive for chest pain     Gastrointestinal: Negative for abdominal pain, constipation, diarrhea, nausea and vomiting  Genitourinary: Positive for flank pain  Negative for difficulty urinating, dysuria and hematuria  Musculoskeletal: Negative for myalgias  Skin: Negative for color change  Neurological: Negative for dizziness and headaches  Psychiatric/Behavioral: Negative for agitation  All other systems reviewed and are negative  Physical Exam  ED Triage Vitals [06/14/21 2036]   Temperature Pulse Respirations Blood Pressure SpO2   99 9 °F (37 7 °C) (!) 138 18 107/76 97 %      Temp Source Heart Rate Source Patient Position - Orthostatic VS BP Location FiO2 (%)   Oral Monitor Sitting Left arm --      Pain Score       Worst Possible Pain             Orthostatic Vital Signs  Vitals:    06/15/21 0645 06/15/21 0800 06/15/21 0815 06/15/21 1215   BP: (!) 71/39 105/59 97/64 98/68   Pulse: 74 78 86 76   Patient Position - Orthostatic VS: Lying Lying Lying Lying       Physical Exam  Vitals and nursing note reviewed  Constitutional:       General: She is not in acute distress  Appearance: Normal appearance  She is well-developed  She is not ill-appearing  HENT:      Head: Normocephalic and atraumatic  Right Ear: External ear normal       Left Ear: External ear normal       Nose: Nose normal       Mouth/Throat:      Mouth: Mucous membranes are moist       Pharynx: Oropharynx is clear  No oropharyngeal exudate  Eyes:      General:         Right eye: No discharge  Left eye: No discharge  Extraocular Movements: Extraocular movements intact  Conjunctiva/sclera: Conjunctivae normal       Pupils: Pupils are equal, round, and reactive to light  Cardiovascular:      Rate and Rhythm: Normal rate and regular rhythm  Heart sounds: Normal heart sounds  No murmur heard  No friction rub  No gallop  Pulmonary:      Effort: Pulmonary effort is normal  No respiratory distress  Breath sounds: Normal breath sounds  No stridor  No wheezing  Abdominal:      General: Abdomen is flat  Bowel sounds are normal  There is no distension  Palpations: Abdomen is soft  Tenderness: There is abdominal tenderness in the right lower quadrant and left lower quadrant  There is right CVA tenderness and left CVA tenderness  Musculoskeletal:         General: No swelling  Normal range of motion  Cervical back: Normal range of motion and neck supple  No rigidity  Skin:     General: Skin is warm and dry  Capillary Refill: Capillary refill takes less than 2 seconds  Neurological:      General: No focal deficit present  Mental Status: She is alert and oriented to person, place, and time  Mental status is at baseline  Motor: No weakness        Gait: Gait normal    Psychiatric:         Mood and Affect: Mood normal          Behavior: Behavior normal          ED Medications  Medications   nicotine (NICODERM CQ) 14 mg/24hr TD 24 hr patch 1 patch (1 patch Transdermal Not Given 6/15/21 0921)   sodium chloride 0 9 % infusion (100 mL/hr Intravenous New Bag 6/15/21 0524)   heparin (porcine) subcutaneous injection 5,000 Units (5,000 Units Subcutaneous Given 6/15/21 0525)   acetaminophen (TYLENOL) tablet 650 mg (650 mg Oral Given 6/15/21 0756)   ceftriaxone (ROCEPHIN) 1 g/50 mL in dextrose IVPB (has no administration in time range)   cyanocobalamin (VITAMIN B-12) tablet 1,000 mcg (1,000 mcg Oral Given 6/15/21 0920)   multivitamin-minerals (CENTRUM) tablet 1 tablet (1 tablet Oral Given 6/15/21 0920)   calcium carbonate (OYSTER SHELL,OSCAL) 500 mg tablet 1 tablet (1 tablet Oral Given 6/15/21 0756)   albuterol (PROVENTIL HFA,VENTOLIN HFA) inhaler 2 puff (has no administration in time range)   ondansetron (ZOFRAN) injection 4 mg (has no administration in time range)   ketorolac (TORADOL) injection 15 mg (has no administration in time range)   butalbital-acetaminophen-caffeine (FIORICET,ESGIC) -40 mg per tablet 1 tablet (has no administration in time range)   metoclopramide (REGLAN) tablet 10 mg (has no administration in time range)   multi-electrolyte (ISOLYTE-S PH 7 4) bolus 1,000 mL (0 mL Intravenous Stopped 6/14/21 2345)   HYDROmorphone (DILAUDID) injection 0 5 mg (0 5 mg Intravenous Given 6/14/21 2258)   iohexol (OMNIPAQUE) 350 MG/ML injection (SINGLE-DOSE) 100 mL (100 mL Intravenous Given 6/14/21 2339)   ceftriaxone (ROCEPHIN) 1 g/50 mL in dextrose IVPB (0 mg Intravenous Stopped 6/15/21 0306)   acetaminophen (TYLENOL) tablet 975 mg (975 mg Oral Given 6/15/21 0136)   HYDROmorphone (DILAUDID) injection 0 5 mg (0 5 mg Intravenous Given 6/15/21 0136)   sodium chloride 0 9 % bolus 1,000 mL (0 mL Intravenous Stopped 6/15/21 0553)   sodium chloride 0 9 % bolus 1,000 mL (0 mL Intravenous Stopped 6/15/21 0921)       Diagnostic Studies  Results Reviewed     Procedure Component Value Units Date/Time    Blood culture #1 [[id number]] Collected: 06/14/21 2210    Lab Status: Preliminary result Specimen: Blood from Arm, Right Updated: 06/15/21 0801     Blood Culture Received in Microbiology Lab  Culture in Progress  Blood culture #2 [[id number]] Collected: 06/14/21 2215    Lab Status: Preliminary result Specimen: Blood from Arm, Left Updated: 06/15/21 0801     Blood Culture Received in Microbiology Lab  Culture in Progress      Procalcitonin Reflex [[id number]]  (Normal) Collected: 06/15/21 0524    Lab Status: Final result Specimen: Blood from Arm, Right Updated: 06/15/21 0742     Procalcitonin 0 06 ng/ml     Basic metabolic panel [[id number]]  (Abnormal) Collected: 06/15/21 0522    Lab Status: Final result Specimen: Blood from Arm, Right Updated: 06/15/21 1265     Sodium 137 mmol/L      Potassium 3 5 mmol/L      Chloride 106 mmol/L      CO2 25 mmol/L      ANION GAP 6 mmol/L      BUN 7 mg/dL      Creatinine 0 62 mg/dL      Glucose 114 mg/dL      Calcium 7 9 mg/dL      eGFR 107 ml/min/1 73sq m     Narrative:      [place] guidelines for Chronic Kidney Disease (CKD):     Stage 1 with normal or high GFR (GFR > 90 mL/min/1 73 square meters)    Stage 2 Mild CKD (GFR = 60-89 mL/min/1 73 square meters)    Stage 3A Moderate CKD (GFR = 45-59 mL/min/1 73 square meters)    Stage 3B Moderate CKD (GFR = 30-44 mL/min/1 73 square meters)    Stage 4 Severe CKD (GFR = 15-29 mL/min/1 73 square meters)    Stage 5 End Stage CKD (GFR <15 mL/min/1 73 square meters)  Note: GFR calculation is accurate only with a steady state creatinine    CBC and differential [[id number]]  (Abnormal) Collected: 06/15/21 0522    Lab Status: Final result Specimen: Blood from Arm, Right Updated: 06/15/21 0559     WBC 16 32 Thousand/uL      RBC 3 43 Million/uL      Hemoglobin 8 0 g/dL      Hematocrit 26 7 %      MCV 78 fL      MCH 23 3 pg      MCHC 30 0 g/dL      RDW 16 1 %      MPV 11 5 fL      Platelets 882 Thousands/uL      nRBC 0 /100 WBCs      Neutrophils Relative 85 %      Immat GRANS % 1 %      Lymphocytes Relative 8 %      Monocytes Relative 6 %      Eosinophils Relative 0 %      Basophils Relative 0 %      Neutrophils Absolute 14 01 Thousands/µL      Immature Grans Absolute 0 08 Thousand/uL      Lymphocytes Absolute 1 25 Thousands/µL      Monocytes Absolute 0 94 Thousand/µL      Eosinophils Absolute 0 01 Thousand/µL      Basophils Absolute 0 03 Thousands/µL     Platelet count [[id number]]     Lab Status: No result Specimen: Blood     Urine Microscopic [[id number]]  (Abnormal) Collected: 06/15/21 0054    Lab Status: Final result Specimen: Urine, Clean Catch Updated: 06/15/21 0114     RBC, UA 10-20 /hpf      WBC, UA 20-30 /hpf      Epithelial Cells None Seen /hpf      Bacteria, UA Innumerable /hpf      Hyaline Casts, UA None Seen /lpf     Urine culture [[id number]] Collected: 06/15/21 0054    Lab Status:  In process Specimen: Urine, Clean Catch Updated: 06/15/21 0114    POCT urinalysis dipstick [[id number]]  (Normal) Resulted: 06/15/21 0058    Lab Status: Final result Updated: 06/15/21 0058     Color, UA yellow     Clarity, UA cloudy Urine Macroscopic, POC [[id number]]  (Abnormal) Collected: 06/15/21 0054    Lab Status: Final result Specimen: Urine Updated: 06/15/21 0055     Color, UA Yellow     Clarity, UA Clear     pH, UA 7 0     Leukocytes, UA Trace     Nitrite, UA Negative     Protein, UA Negative mg/dl      Glucose, UA Negative mg/dl      Ketones, UA 15 (1+) mg/dl      Urobilinogen, UA 1 0 E U /dl      Bilirubin, UA Negative     Blood, UA Moderate     Specific [place], UA 1 015    Narrative:      CLINITEK RESULT    Procalcitonin with AM Reflex [[id number]]  (Normal) Collected: 06/14/21 2217    Lab Status: Final result Specimen: Blood from Arm, Right Updated: 06/14/21 2302     Procalcitonin <0 05 ng/ml     Lactic acid, plasma [[id number]]  (Normal) Collected: 06/14/21 2217    Lab Status: Final result Specimen: Blood from Arm, Right Updated: 06/14/21 2251     LACTIC ACID 1 4 mmol/L     Narrative:      Result may be elevated if tourniquet was used during collection      hCG, qualitative pregnancy [[id number]]  (Normal) Collected: 06/14/21 2048    Lab Status: Final result Specimen: Blood from Arm, Right Updated: 06/14/21 2225     Preg, Serum Negative    Comprehensive metabolic panel [[id number]]  (Abnormal) Collected: 06/14/21 2048    Lab Status: Final result Specimen: Blood from Arm, Right Updated: 06/14/21 2124     Sodium 132 mmol/L      Potassium 3 6 mmol/L      Chloride 104 mmol/L      CO2 21 mmol/L      ANION GAP 7 mmol/L      BUN 7 mg/dL      Creatinine 0 70 mg/dL      Glucose 129 mg/dL      Calcium 8 9 mg/dL      Corrected Calcium 9 4 mg/dL      AST 11 U/L      ALT 14 U/L      Alkaline Phosphatase 87 U/L      Total Protein 7 5 g/dL      Albumin 3 4 g/dL      Total Bilirubin 0 52 mg/dL      eGFR 103 ml/min/1 73sq m     Narrative:      [place] guidelines for Chronic Kidney Disease (CKD):     Stage 1 with normal or high GFR (GFR > 90 mL/min/1 73 square meters)    Stage 2 Mild CKD (GFR = 60-89 mL/min/1 73 square meters)    Stage 3A Moderate CKD (GFR = 45-59 mL/min/1 73 square meters)    Stage 3B Moderate CKD (GFR = 30-44 mL/min/1 73 square meters)    Stage 4 Severe CKD (GFR = 15-29 mL/min/1 73 square meters)    Stage 5 End Stage CKD (GFR <15 mL/min/1 73 square meters)  Note: GFR calculation is accurate only with a steady state creatinine    Troponin I [[id number]]  (Normal) Collected: 06/14/21 2048    Lab Status: Final result Specimen: Blood from Arm, Right Updated: 06/14/21 2122     Troponin I <0 02 ng/mL     CBC and differential [[id number]]  (Abnormal) Collected: 06/14/21 2048    Lab Status: Final result Specimen: Blood from Arm, Right Updated: 06/14/21 2108     WBC 18 37 Thousand/uL      RBC 3 96 Million/uL      Hemoglobin 9 4 g/dL      Hematocrit 30 3 %      MCV 77 fL      MCH 23 7 pg      MCHC 31 0 g/dL      RDW 15 9 %      MPV 11 4 fL      Platelets 403 Thousands/uL      nRBC 0 /100 WBCs      Neutrophils Relative 88 %      Immat GRANS % 1 %      Lymphocytes Relative 6 %      Monocytes Relative 5 %      Eosinophils Relative 0 %      Basophils Relative 0 %      Neutrophils Absolute 16 19 Thousands/µL      Immature Grans Absolute 0 13 Thousand/uL      Lymphocytes Absolute 1 05 Thousands/µL      Monocytes Absolute 0 97 Thousand/µL      Eosinophils Absolute 0 00 Thousand/µL      Basophils Absolute 0 03 Thousands/µL                  CTA dissection protocol chest/abdomen/pelvis   Final Result by Madalyn Brian MD (06/15 0100)      1  No evidence of aortic dissection, intramural hematoma, or aortic aneurysm  2   Multiple ill-defined hypoattenuating regions in the right kidney with associated perinephric stranding suspicious for acute pyelonephritis  3   Mild diffuse right ureteral wall thickening consistent with ureteritis likely related to ascending infection  Correlate with urinalysis  4   Hepatic steatosis  The study was marked in [facility] for immediate notification           Workstation performed: [id number] XR chest 1 view portable   Final Result by Abby Miller MD (06/15 1109)      No acute cardiopulmonary disease  Workstation performed: [id number]               Procedures  Procedures      ED Course  ED Course as of [place] 15 1301   Mon Jun 14, 2021   2253 WBC(!): 18 37   2309 Procalcitonin: <0 05   2309 LACTIC ACID: 1 4             HEART Risk Score      Most Recent Value   Heart Score Risk Calculator   History  0 Filed at: 06/14/2021 2230   ECG  0 Filed at: 06/14/2021 2230   Age  1 Filed at: 06/14/2021 2230   Risk Factors  0 Filed at: 06/14/2021 2230   Troponin  0 Filed at: 06/14/2021 2230   HEART Score  1 Filed at: 06/14/2021 2230                      SBIRT 20yo+      Most Recent Value   SBIRT (23 yo +)   In order to provide better care to our patients, we are screening all of our patients for alcohol and drug use  Would it be okay to ask you these screening questions? Unable to answer at this time Filed at: 06/14/2021 2345                MDM  Number of Diagnoses or Management Options  Diagnosis management comments: 52year old female with no significant past medical history that presents to the ED today with b/l flank pain and now newly intermittent chest pain  At this time there is concern for pyelonephritis vs  Dissection  Will evaluate with a CTA dissection protocol  Will also evaluate with CBC, CMP, troponin, EKG, lactic acid, procal, and blood cultures         Disposition  Final diagnoses:   Pyelonephritis   Bilateral flank pain   Tachycardia   Chest pain     Time reflects when diagnosis was documented in both MDM as applicable and the Disposition within this note     Time User Action Codes Description Comment    6/15/2021  1:18 AM Minor, Indira Add [N12] Pyelonephritis     6/15/2021  1:18 AM Minor, Indira Add [R10 9] Bilateral flank pain     6/15/2021  1:36 AM Minor, Indira Add [R00 0] Tachycardia     6/15/2021  1:36 AM Minor, Indira Add [R07 9] Chest pain       ED Disposition     ED Disposition Condition Date/Time Comment    Admit Stable [facility] [place] 15, 2021  1:36 AM Case was discussed with SOD and the patient's admission status was agreed to be Admission Status: observation status to the service of SOD   Follow-up Information    None         Patient's Medications   Discharge Prescriptions    No medications on file     No discharge procedures on file  PDMP Review       Value Time User    PDMP Reviewed  Yes 1/6/2021  4:39 PM Jesse Andrews MD           ED Provider  Attending physically available and evaluated Channing Escalera I managed the patient along with the ED Attending      Electronically Signed by         Maxine Carcamo MD  06/15/21 6382

## 2021-06-15 NOTE — H&P
INTERNAL MEDICINE RESIDENCY ADMISSION H&P     Name: Valdemar Singh   Age & Sex: 52 y o  female   MRN: [id number]  Unit/Bed#: ED 26   Encounter: [id number]  Primary Care Provider: Laura Louie MD    Code Status: Level 1 - Full Code  Admission Status: INPATIENT   Disposition: Patient requires Level 2 Step Down     Admit to team: SOD Team B     ASSESSMENT/PLAN     Principal Problem:    [facility] ([facility] )  Active Problems:    Hypotension    Asthma    Vitamin D deficiency    Migraine without status migrainosus, not intractable    Pyelonephritis      * Sepsis ([facility] )  Assessment & Plan  POA  Tachycardia along with elevated WBC count  UA positive  Normal lactic acid and procalcitonin  CTA dissection protocol showed Multiple ill-defined hypoattenuating regions in the right kidney with associated perinephric stranding suspicious for acute pyelonephritis  Mild diffuse right ureteral wall thickening consistent with ureteritis likely related to ascending infection  Correlate with urinalysis  Plan:  · Will start patient on IV ceftriaxone  · Status post IV fluid bolus and will continue maintenance fluids  · Follow-up on blood cultures and urine culture  · Hold on to home clonidine given hypotensive episode in ER  · Continue to monitor renal function and fever curve daily  Hypotension  Assessment & Plan  · Patient had an episode of hypotension in ER likely in setting of sepsis  · Will give 1 L IV fluid bolus  · Will reassess throughout the night  · Will admit level 2 step-down  Pyelonephritis  Assessment & Plan  · See sepsis section for more detail  Migraine without status migrainosus, not intractable  Assessment & Plan  · Continue home p r n  Fioricet and Reglan  Vitamin D deficiency  Assessment & Plan  · Continue vitamin-D supplements     Asthma  Assessment & Plan  · Continue home p r n  albuterol        VTE Pharmacologic Prophylaxis: Heparin  VTE Mechanical Prophylaxis: sequential compression device    CHIEF COMPLAINT     Chief Complaint   Patient presents with    Chest Pain     Patient complaining of pain all over; reports kidney pain for two weeks; also reports stabbing chest pain that started today; also reports neck pain at times too    Back Pain      HISTORY OF PRESENT ILLNESS     This is 52 year female with past medical history of asthma, vitamin-D deficiency, UTI, migraines, anemia is presenting with 1 week history of abdominal pain and back pain  Patient admits to have previous history of UTI in the past   Patient admits to have abdominal pain and back pain for past 1 week  Patient also started having some chest pain this morning  She denies any nausea or vomiting  Also denies any urinary symptoms including no dysuria, urgency  On initial presentation, patient was tachycardic with blood pressure of 663 systolic  Patient had an episode of hypotension in ER with blood pressure in 09I to 11Q systolic  She received IV fluid bolus with improvement to blood pressure in high 90s  Labs significant for normal lactic acid, normal troponin, WBC 18 3, hemoglobin 9 4   UA positive, negative procalcitonin  CTA dissection protocol showed Multiple ill-defined hypoattenuating regions in the right kidney with associated perinephric stranding suspicious for acute pyelonephritis  Mild diffuse right ureteral wall thickening consistent with ureteritis likely related to ascending infection   Correlate with urinalysis  Patient received IV ceftriaxone in ER and was admitted for sepsis secondary to pyelonephritis  On my evaluation, patient was alert and oriented x3  She was complaining of flank pain and back pain  REVIEW OF SYSTEMS     Review of Systems   Constitutional: Positive for fatigue and fever  Negative for chills  Weakness  HENT: Negative for congestion, rhinorrhea, sinus pressure and sinus pain  Respiratory: Negative for apnea, cough, shortness of breath and wheezing  Cardiovascular: Negative for chest pain, palpitations and leg swelling  Gastrointestinal: Negative for abdominal distention, abdominal pain, constipation, diarrhea and nausea  Endocrine: Negative for cold intolerance, polydipsia and polyphagia  Musculoskeletal: Negative for arthralgias, back pain, joint swelling and myalgias  Skin: Negative for color change, rash and wound  Neurological: Negative for dizziness, seizures, weakness, light-headedness, numbness and headaches  Psychiatric/Behavioral: Negative for agitation and hallucinations  The patient is not nervous/anxious  OBJECTIVE     Vitals:    06/14/21 2036 06/15/21 0122 06/15/21 0318 06/15/21 0345   BP: 107/76  (!) 76/41 99/52   BP Location: Left arm  Left arm Right arm   Pulse: (!) 138 88 72 78   Resp: 18  16 18   Temp: 99 9 °F (37 7 °C)  98 2 °F (36 8 °C)    TempSrc: Oral  Tympanic    SpO2: 97%  98% 99%   Weight: 83 9 kg (185 lb)      Height: 5' 5" (1 651 m)         Temperature:   Temp (24hrs), Av 1 °F (37 3 °C), Min:98 2 °F (36 8 °C), Max:99 9 °F (37 7 °C)    Temperature: 98 2 °F (36 8 °C)  Intake & Output:  I/O        07 -  0700  07 - 06/15 0700    I V  (mL/kg)  1000 (11 9)    IV Piggyback  50    Total Intake(mL/kg)  1050 (12 5)    Net  +1050              Weights:   IBW (Ideal Body Weight): 57 kg    Body mass index is 30 79 kg/m²  Weight (last 2 days)     Date/Time   Weight    21   83 9 (185)            Physical Exam  Vitals reviewed  Constitutional:       General: She is not in acute distress  Appearance: She is well-developed  She is not diaphoretic  HENT:      Head: Normocephalic and atraumatic  Nose: Nose normal       Mouth/Throat:      Pharynx: No oropharyngeal exudate  Eyes:      General: No scleral icterus  Conjunctiva/sclera: Conjunctivae normal    Neck:      Thyroid: No thyromegaly  Vascular: No JVD  Trachea: No tracheal deviation     Cardiovascular:      Rate and Rhythm: Normal rate and regular rhythm  Heart sounds: Normal heart sounds  No murmur heard  No friction rub  No gallop  Pulmonary:      Effort: Pulmonary effort is normal  No respiratory distress  Breath sounds: Normal breath sounds  No stridor  No wheezing or rales  Chest:      Chest wall: No tenderness  Abdominal:      General: Bowel sounds are normal  There is no distension  Palpations: Abdomen is soft  There is no mass  Tenderness: There is abdominal tenderness  There is no guarding or rebound  Musculoskeletal:         General: No tenderness  Normal range of motion  Cervical back: Neck supple  Skin:     General: Skin is warm  Findings: No erythema or rash  Neurological:      Mental Status: She is alert and oriented to person, place, and time  Sensory: No sensory deficit  Psychiatric:         Behavior: Behavior normal          Thought Content:  Thought content normal          Judgment: Judgment normal        PAST MEDICAL HISTORY     Past Medical History:   Diagnosis Date    Asthma     Colon polyp     Gastroduodenitis     Intestinal malabsorption     Migraine     Pyonephrosis     UTI (urinary tract infection)     Vitamin D deficiency      PAST SURGICAL HISTORY     Past Surgical History:   Procedure Laterality Date    ABDOMINAL SURGERY      Twisted bowel and internal hernia    DILATION AND CURETTAGE OF UTERUS  2017    GASTRIC BYPASS  06/2008    LAPAROSCOPIC GASTRIC BANDING  2005    TUBAL LIGATION      WISDOM TOOTH EXTRACTION       SOCIAL & FAMILY HISTORY     Social History     Substance and Sexual Activity   Alcohol Use Yes       Social History     Substance and Sexual Activity   Drug Use Never     Social History     Tobacco Use   Smoking Status Current Some Day Smoker   Smokeless Tobacco Never Used   Tobacco Comment    Social smoker per Cherie Smith chart     Family History   Problem Relation Age of Onset    Thyroid disease Mother     Heart disease Family     Diabetes Family     Hypertension Family     Breast cancer Family     Colon cancer Family     Prostate cancer Family     Gallbladder disease Family     Stomach cancer Family     Ovarian cancer Family     Kidney cancer Family     Lung cancer Family     Thyroid disease Family      LABORATORY DATA     Labs: I have personally reviewed pertinent reports  Results from last 7 days   Lab Units 06/14/21  2048   WBC Thousand/uL 18 37*   HEMOGLOBIN g/dL 9 4*   HEMATOCRIT % 30 3*   PLATELETS Thousands/uL 254   NEUTROS PCT % 88*   MONOS PCT % 5      Results from last 7 days   Lab Units 06/14/21  2048   POTASSIUM mmol/L 3 6   CHLORIDE mmol/L 104   CO2 mmol/L 21   BUN mg/dL 7   CREATININE mg/dL 0 70   CALCIUM mg/dL 8 9   ALK PHOS U/L 87   ALT U/L 14   AST U/L 11                  Results from last 7 days   Lab Units 06/14/21  2217   LACTIC ACID mmol/L 1 4     Results from last 7 days   Lab Units 06/14/21  2048   TROPONIN I ng/mL <0 02     Micro:  No results found for: Neita Wallback, WOUNDCULT, SPUTUMCULTUR  IMAGING & DIAGNOSTIC TESTS     Imaging: I have personally reviewed pertinent reports  CTA dissection protocol chest/abdomen/pelvis    Result Date: 6/15/2021  Impression: 1  No evidence of aortic dissection, intramural hematoma, or aortic aneurysm  2   Multiple ill-defined hypoattenuating regions in the right kidney with associated perinephric stranding suspicious for acute pyelonephritis  3   Mild diffuse right ureteral wall thickening consistent with ureteritis likely related to ascending infection  Correlate with urinalysis  4   Hepatic steatosis  The study was marked in [facility] for immediate notification  Workstation performed: [id number]     EKG, Pathology, and Other Studies: I have personally reviewed pertinent reports  ALLERGIES   No Known Allergies  MEDICATIONS PRIOR TO ARRIVAL     Prior to Admission medications    Medication Sig Start Date End Date Taking?  Authorizing Provider   albuterol (PROVENTIL HFA,VENTOLIN HFA) 90 mcg/act inhaler Inhale 2 puffs every 6 (six) hours as needed   Yes Historical Provider, MD   butalbital-acetaminophen-caffeine (FIORICET,ESGIC) -40 mg per tablet Take 1 tablet by mouth every 4 (four) hours as needed for headaches   Yes Historical Provider, MD   cloNIDine (CATAPRES) 0 1 mg tablet Take 0 1 mg by mouth every 12 (twelve) hours   Yes Historical Provider, MD   ergocalciferol (VITAMIN D2) 50,000 units 2 CAPSULES ONCE A WEEK 1/18/21  Yes Ivett Andrade MD   lisdexamfetamine (VYVANSE) 50 MG capsule Take 50 mg by mouth every morning   Yes Historical Provider, MD   metoclopramide (REGLAN) 10 mg tablet Take 1 tablet (10 mg total) by mouth daily as needed (headache) 1/6/21  Yes Ivett Andrade MD   Multiple Vitamin (Daily-Isra) TABS TAKE 1 TABLET BY MOUTH EVERY DAY 9/29/20  Yes Ivett Andrade MD   vitamin B-12 (VITAMIN B-12) 1,000 mcg tablet Take by mouth daily   Yes Historical Provider, MD   CALCIUM CITRATE PO Take by mouth    Historical Provider, MD   pantoprazole (PROTONIX) 40 mg tablet Take 1 tablet (40 mg total) by mouth daily before breakfast  Patient not taking: Reported on 1/7/2021 1/6/21 6/15/21  Ivett Andrade MD   Plenvu 140 g SOLR  1/11/21 6/15/21  Historical Provider, MD     MEDICATIONS ADMINISTERED IN LAST 24 HOURS     Medication Administration - last 24 hours from 06/14/2021 0408 to 06/15/2021 0408       Date/Time Order Dose Route Action Action by     06/14/2021 2216 sodium chloride 0 9 % bolus 1,000 mL   Intravenous Canceled Entry Roxanne Quiroz, CHRIS     06/14/2021 2345 multi-electrolyte (ISOLYTE-S PH 7 4) bolus 1,000 mL 0 mL Intravenous Stopped Dalton Meyers, CHRIS     06/14/2021 2217 multi-electrolyte (ISOLYTE-S PH 7 4) bolus 1,000 mL 1,000 mL Intravenous [address]     06/14/2021 2257 HYDROmorphone (DILAUDID) injection 0 5 mg 0 5 mg Intravenous Given Dalton Meyers RN     06/14/2021 7802 iohexol (OMNIPAQUE) 350 MG/ML injection (SINGLE-DOSE) 100 mL 100 mL Intravenous Given Jaymie      06/15/2021 0306 ceftriaxone (ROCEPHIN) 1 g/50 mL in dextrose IVPB 0 mg Intravenous Stopped Didi Brito RN     06/15/2021 0136 ceftriaxone (ROCEPHIN) 1 g/50 mL in dextrose IVPB 1,000 mg Intravenous New Bag Didi Brito RN     06/15/2021 0136 acetaminophen (TYLENOL) tablet 975 mg 975 mg Oral Given Didi Brito RN     06/15/2021 0136 HYDROmorphone (DILAUDID) injection 0 5 mg 0 5 mg Intravenous Given Didi Brito RN     06/15/2021 0330 sodium chloride 0 9 % bolus 1,000 mL 1,000 mL Intravenous New Bag Alicia Khalil RN        CURRENT MEDICATIONS     Current Facility-Administered Medications   Medication Dose Route Frequency Provider Last Rate    acetaminophen  650 mg Oral Q6H PRN Vessie Grise, DO      albuterol  2 puff Inhalation Q6H PRN Vessie Grise, DO      calcium carbonate  1 tablet Oral Daily With Breakfast Vessie Grise, DO      [START ON 6/16/2021] cefTRIAXone  1,000 mg Intravenous Q24H Vessie Grise, DO      vitamin B-12  1,000 mcg Oral Daily Vessie Grise, DO      heparin (porcine)  5,000 Units Subcutaneous [facility] Vessie Grise, DO      lisdexamfetamine  50 mg Oral QAM Vessie Grise, DO      metoclopramide  10 mg Oral Daily PRN Vessie Grise, DO      multivitamin-minerals  1 tablet Oral Daily Vessie Grise, DO      nicotine  1 patch Transdermal Daily Vessie Grise, DO      sodium chloride  1,000 mL Intravenous Once Jeanell Snowball, DO 1,000 mL (06/15/21 0330)    sodium chloride  100 mL/hr Intravenous Continuous Vessie Grise, DO       sodium chloride, 100 mL/hr      acetaminophen, 650 mg, Q6H PRN  albuterol, 2 puff, Q6H PRN  metoclopramide, 10 mg, Daily PRN        Admission Time  I spent 1 hour admitting the patient  This involved direct patient contact where I performed a full history and physical, reviewing previous records, and reviewing laboratory and other diagnostic studies      Portions of the record may have been created with voice recognition software  Occasional wrong word or "sound a like" substitutions may have occurred due to the inherent limitations of voice recognition software    Read the chart carefully and recognize, using context, where substitutions have occurred     ==  Madelin Maldonado, [facility]  Internal Medicine Residency PGY-2

## 2021-06-15 NOTE — ASSESSMENT & PLAN NOTE
· One episode of SOB on 6/15, quickly resolved  Advised to notify nurse if further episodes occur  · Continue home p r n  albuterol

## 2021-06-15 NOTE — PROGRESS NOTES
INTERNAL MEDICINE RESIDENCY SENIOR ADMISSION NOTE     Name: Abrahan Del Toro   Age & Sex: 52 y o  female   MRN: [id number]  Unit/Bed#: ED 32   Encounter: [id number]  Primary Care Provider: Ash St MD    Admit to team: SOD Team B     Patient seen and examined  Reviewed H&P per Dr Dai Simple   Agree with the assessment and plan with any exception/addition as noted below:    Principal Problem:    Sepsis ([facility] )  Active Problems:    Asthma    Vitamin D deficiency    Iron deficiency anemia    Migraine without status migrainosus, not intractable    Pyelonephritis    This is 52 year female with past medical history of vitamin-D deficiency, asthma, migraine is presenting with abdominal pain, chest pain and back pain  Patient was initially found to be tachycardic which improved with IV fluids  Labs significant for WBC 18 3, normal lactic acid, UA positive and negative procalcitonin  CTA dissection protocol showed no dissection although showed right sided perinephric stranding concerning for pyelonephritis  Patient was started on IV fluids and was admitted for sepsis secondary to right-sided pyelonephritis        Code Status: Level 1 - Full Code  Admission Status: OBSERVATION  Disposition: Patient requires Med/Surg  Expected Length of Stay: <2 nights

## 2021-06-15 NOTE — ASSESSMENT & PLAN NOTE
· Patient had an episode of hypotension in ER likely in setting of sepsis    · Continue close monitoring of blood pressures  · Baseline systolic pressures in 265'K  · SBP in 100's over last 24 hrs, stable  · Continue maintenance fluids, bolus with 1 L NS for hypotensive episodes

## 2021-06-16 LAB
ANION GAP SERPL CALCULATED.3IONS-SCNC: 4 MMOL/L (ref 4–13)
BASOPHILS # BLD AUTO: 0.04 THOUSANDS/ÂΜL (ref 0–0.1)
BASOPHILS NFR BLD AUTO: 0 % (ref 0–1)
BUN SERPL-MCNC: 6 MG/DL (ref 5–25)
CALCIUM SERPL-MCNC: 7.7 MG/DL (ref 8.3–10.1)
CHLORIDE SERPL-SCNC: 110 MMOL/L (ref 100–108)
CO2 SERPL-SCNC: 25 MMOL/L (ref 21–32)
CREAT SERPL-MCNC: 0.47 MG/DL (ref 0.6–1.3)
EOSINOPHIL # BLD AUTO: 0.03 THOUSAND/ÂΜL (ref 0–0.61)
EOSINOPHIL NFR BLD AUTO: 0 % (ref 0–6)
ERYTHROCYTE [DISTWIDTH] IN BLOOD BY AUTOMATED COUNT: 16.3 % (ref 11.6–15.1)
GFR SERPL CREATININE-BSD FRML MDRD: 117 ML/MIN/1.73SQ M
GLUCOSE SERPL-MCNC: 92 MG/DL (ref 65–140)
HCT VFR BLD AUTO: 24.8 % (ref 34.8–46.1)
HGB BLD-MCNC: 7.5 G/DL (ref 11.5–15.4)
IMM GRANULOCYTES # BLD AUTO: 0.06 THOUSAND/UL (ref 0–0.2)
IMM GRANULOCYTES NFR BLD AUTO: 1 % (ref 0–2)
LYMPHOCYTES # BLD AUTO: 1.45 THOUSANDS/ÂΜL (ref 0.6–4.47)
LYMPHOCYTES NFR BLD AUTO: 13 % (ref 14–44)
MCH RBC QN AUTO: 23.5 PG (ref 26.8–34.3)
MCHC RBC AUTO-ENTMCNC: 30.2 G/DL (ref 31.4–37.4)
MCV RBC AUTO: 78 FL (ref 82–98)
MONOCYTES # BLD AUTO: 0.68 THOUSAND/ÂΜL (ref 0.17–1.22)
MONOCYTES NFR BLD AUTO: 6 % (ref 4–12)
NEUTROPHILS # BLD AUTO: 8.62 THOUSANDS/ÂΜL (ref 1.85–7.62)
NEUTS SEG NFR BLD AUTO: 80 % (ref 43–75)
NRBC BLD AUTO-RTO: 0 /100 WBCS
PLATELET # BLD AUTO: 184 THOUSANDS/UL (ref 149–390)
PMV BLD AUTO: 11.5 FL (ref 8.9–12.7)
POTASSIUM SERPL-SCNC: 3.6 MMOL/L (ref 3.5–5.3)
RBC # BLD AUTO: 3.19 MILLION/UL (ref 3.81–5.12)
SODIUM SERPL-SCNC: 139 MMOL/L (ref 136–145)
WBC # BLD AUTO: 10.88 THOUSAND/UL (ref 4.31–10.16)

## 2021-06-16 PROCEDURE — 85025 COMPLETE CBC W/AUTO DIFF WBC: CPT | Performed by: STUDENT IN AN ORGANIZED HEALTH CARE EDUCATION/TRAINING PROGRAM

## 2021-06-16 PROCEDURE — 80048 BASIC METABOLIC PNL TOTAL CA: CPT | Performed by: STUDENT IN AN ORGANIZED HEALTH CARE EDUCATION/TRAINING PROGRAM

## 2021-06-16 RX ADMIN — CYANOCOBALAMIN TAB 500 MCG 1000 MCG: 500 TAB at 08:39

## 2021-06-16 RX ADMIN — HEPARIN SODIUM 5000 UNITS: 5000 INJECTION INTRAVENOUS; SUBCUTANEOUS at 21:14

## 2021-06-16 RX ADMIN — HEPARIN SODIUM 5000 UNITS: 5000 INJECTION INTRAVENOUS; SUBCUTANEOUS at 13:28

## 2021-06-16 RX ADMIN — HEPARIN SODIUM 5000 UNITS: 5000 INJECTION INTRAVENOUS; SUBCUTANEOUS at 05:30

## 2021-06-16 RX ADMIN — CEFTRIAXONE SODIUM 1000 MG: 10 INJECTION, POWDER, FOR SOLUTION INTRAVENOUS at 01:38

## 2021-06-16 RX ADMIN — SODIUM CHLORIDE 100 ML/HR: 0.9 INJECTION, SOLUTION INTRAVENOUS at 15:24

## 2021-06-16 RX ADMIN — BUTALBITAL, ACETAMINOPHEN, AND CAFFEINE 1 TABLET: 50; 325; 40 TABLET ORAL at 15:02

## 2021-06-16 RX ADMIN — Medication 1 TABLET: at 08:38

## 2021-06-16 RX ADMIN — IRON SUCROSE 200 MG: 20 INJECTION, SOLUTION INTRAVENOUS at 09:41

## 2021-06-16 RX ADMIN — CALCIUM 1 TABLET: 500 TABLET ORAL at 08:38

## 2021-06-16 RX ADMIN — KETOROLAC TROMETHAMINE 15 MG: 30 INJECTION, SOLUTION INTRAMUSCULAR; INTRAVENOUS at 23:06

## 2021-06-16 NOTE — PROGRESS NOTES
Patient complaining about new pain in the back of her right knee since her iron infusion, I TT the team to let them know  Continue to monitor

## 2021-06-16 NOTE — UTILIZATION REVIEW
Initial Clinical Review    Admission: Date/Time/Statement:   Admission Orders: Observation 6/15 @ 0136 and changed to Inpatient on 6/15 @ 1246  Pt requiring continued stay for UTI/ Iv antibiotics/ + Urine cultures  Ordered        06/15/21 1246  Inpatient Admission  Once         06/15/21 0136  Place in Observation  Once                   Orders Placed This Encounter   Procedures    Inpatient Admission     Standing Status:   Standing     Number of Occurrences:   1     Order Specific Question:   Level of Care     Answer:   Level 2 Stepdown / HOT [14]     Order Specific Question:   Estimated length of stay     Answer:   More than 2 Midnights     Order Specific Question:   Certification     Answer:   I certify that inpatient services are medically necessary for this patient for a duration of greater than two midnights  See H&P and MD Progress Notes for additional information about the patient's course of treatment  ED Arrival Information     Expected Arrival Acuity    - 6/14/2021 20:32 Urgent         Means of arrival Escorted by Service Admission type    [facility] Urgent         Arrival complaint    flank pain,chest pain         Chief Complaint   Patient presents with    Chest Pain     Patient complaining of pain all over; reports kidney pain for two weeks; also reports stabbing chest pain that started today; also reports neck pain at times too    Back Pain     Initial Presentation:   56y Female to ED presents abdominal pain and back pain x1 wk  Also c/o chest pain this am  PMH for Asthma, vitamin-D deficiency, UTI, migraines, anemia  IN ED noted to be tachycardic with BP of 866 systolic, then episode of hypotension with BP 29Z to 73O systolic  Given IVF bolus and improved ot BP in high 90s  UA positive  CTA dissection protocol showed Multiple ill-defined hypoattenuating regions in the right kidney with associated perinephric stranding suspicious for acute pyelonephritis   Mild diffuse right ureteral wall thickening consistent with ureteritis likely related to ascending infection   Correlate with urinalysis  Given IV antibiotics x in ED  Admit Observation level of care for Sepsis, Hypotension, Pyelonephritis  Continue Iv antibiotics and IVFs  F/u bld and urine cultures  Hold on to home clonidine given hypotensive episode in ER  Monitor renal function and fever curve  6/15 Progress notes; Continue Iv antibiotics and IVFs  Bld and urine culture pending  C/o lightheadedness, nausea and bilateral flank pain right more than left  Zofran prn for nausea  Pain control  Date: 6/16   Day 2:   Progress notes; Continue IV antibiotics  Pending urine culture sensitivities  S/p IVF x3 and will continue IVFs  Urine culture - G(-) rods, sensitivity pending  Hold on to home clonidine given hypotensive episode in ER       ED Triage Vitals [06/14/21 2036]   Temperature Pulse Respirations Blood Pressure SpO2   99 9 °F (37 7 °C) (!) 138 18 107/76 97 %      Temp Source Heart Rate Source Patient Position - Orthostatic VS BP Location FiO2 (%)   Oral Monitor Sitting Left arm --      Pain Score       Worst Possible Pain          Wt Readings from Last 1 Encounters:   06/14/21 83 9 kg (185 lb)     Additional Vital Signs:   /16/21 1059  99 1 °F (37 3 °C)  84  17  110/60  --  95 %  --  --   06/16/21 0800  --  --  --  --  --  --  None (Room air)       06/16/21 07:14:53  99 1 °F (37 3 °C)  89  17  94/59  71  96 %  --  --   06/16/21 0330  --  --  --  90/48  Abnormal    --  --  --  --   BP: recheck at 06/16/21 0330   06/16/21 03:21:49  100 °F (37 8 °C)  89  16  87/50  Abnormal   62  94 %  --  --   06/16/21 0100  --  --  --  --  --  98 %  None (Room air)       06/15/21 0645  --  74  18  71/39  Abnormal   52  99 %  None (Room air)  Lying   06/15/21 0345  --  78  18  99/52  62  99 %  None (Room air)  Lying   06/15/21 0318  98 2 °F (36 8 °C)  72  16  76/41  Abnormal   52  98 %  None (Room air)       Pertinent Labs/Diagnostic Test Results:   6/14   CTA dissection protocol chest/abd/pelvis - 1  No evidence of aortic dissection, intramural hematoma, or aortic aneurysm  2   Multiple ill-defined hypoattenuating regions in the right kidney with associated perinephric stranding suspicious for acute pyelonephritis  3   Mild diffuse right ureteral wall thickening consistent with ureteritis likely related to ascending infection  Correlate with urinalysis  4   Hepatic steatosis      PCXR - No acute cardiopulmonary disease          Results from last 7 days   Lab Units 06/16/21  0454 06/15/21  0522 06/14/21  2048   WBC Thousand/uL 10 88* 16 32* 18 37*   HEMOGLOBIN g/dL 7 5* 8 0* 9 4*   HEMATOCRIT % 24 8* 26 7* 30 3*   PLATELETS Thousands/uL 184 191 254   NEUTROS ABS Thousands/µL 8 62* 14 01* 16 19*         Results from last 7 days   Lab Units 06/16/21  0454 06/15/21  0522 06/14/21  2048   SODIUM mmol/L 139 137 132*   POTASSIUM mmol/L 3 6 3 5 3 6   CHLORIDE mmol/L 110* 106 104   CO2 mmol/L 25 25 21   ANION GAP mmol/L 4 6 7   BUN mg/dL 6 7 7   CREATININE mg/dL 0 47* 0 62 0 70   EGFR ml/min/1 73sq m 117 107 103   CALCIUM mg/dL 7 7* 7 9* 8 9     Results from last 7 days   Lab Units 06/14/21  2048   AST U/L 11   ALT U/L 14   ALK PHOS U/L 87   TOTAL PROTEIN g/dL 7 5   ALBUMIN g/dL 3 4*   TOTAL BILIRUBIN mg/dL 0 52         Results from last 7 days   Lab Units 06/16/21  0454 06/15/21  0522 06/14/21  2048   GLUCOSE RANDOM mg/dL 92 114 129       Results from last 7 days   Lab Units 06/14/21  2048   TROPONIN I ng/mL <0 02       Results from last 7 days   Lab Units 06/15/21  0524 06/14/21  2217   PROCALCITONIN ng/ml 0 06 <0 05     Results from last 7 days   Lab Units 06/14/21  2217   LACTIC ACID mmol/L 1 4       Results from last 7 days   Lab Units 06/15/21  0058 06/15/21  0054   CLARITY UA  cloudy Clear   COLOR UA  yellow Yellow   SPEC GRAV UA   --  1 015   PH UA   --  7 0   GLUCOSE UA mg/dl  --  Negative   KETONES UA mg/dl  --  15 (1+)*   BLOOD UA   -- Moderate*   PROTEIN UA mg/dl  --  Negative   NITRITE UA   --  Negative   BILIRUBIN UA   --  Negative   UROBILINOGEN UA E U /dl  --  1 0   LEUKOCYTES UA   --  Trace*   WBC UA /hpf  --  20-30*   RBC UA /hpf  --  10-20*   BACTERIA UA /hpf  --  Innumerable*   EPITHELIAL CELLS WET PREP /hpf  --  None Seen     ED Treatment:   Medication Administration from 06/14/2021 2032 to 06/15/2021 0756       Date/Time Order Dose Route Action     06/14/2021 2217 multi-electrolyte (ISOLYTE-S PH 7 4) bolus 1,000 mL 1,000 mL Intravenous New Bag     06/14/2021 2258 HYDROmorphone (DILAUDID) injection 0 5 mg 0 5 mg Intravenous Given     06/14/2021 2339 iohexol (OMNIPAQUE) 350 MG/ML injection (SINGLE-DOSE) 100 mL 100 mL Intravenous Given     06/15/2021 0136 ceftriaxone (ROCEPHIN) 1 g/50 mL in dextrose IVPB 1,000 mg Intravenous New Bag     06/15/2021 0136 acetaminophen (TYLENOL) tablet 975 mg 975 mg Oral Given     06/15/2021 0136 HYDROmorphone (DILAUDID) injection 0 5 mg 0 5 mg Intravenous Given     06/15/2021 0524 sodium chloride 0 9 % infusion 100 mL/hr Intravenous New Bag     06/15/2021 0525 heparin (porcine) subcutaneous injection 5,000 Units 5,000 Units Subcutaneous Given     06/15/2021 0330 sodium chloride 0 9 % bolus 1,000 mL 1,000 mL Intravenous New Bag     06/15/2021 0706 sodium chloride 0 9 % bolus 1,000 mL 1,000 mL Intravenous New Bag        Past Medical History:   Diagnosis Date    Asthma     Colon polyp     Gastroduodenitis     Intestinal malabsorption     Migraine     Pyonephrosis     UTI (urinary tract infection)     Vitamin D deficiency      Present on Admission:   Sepsis ([address] )   Pyelonephritis   Migraine without status migrainosus, not intractable   Asthma   Vitamin D deficiency   Hypotension   Iron deficiency anemia      Admitting Diagnosis: Chest pain [R07 9]  Pyelonephritis [N12]  Tachycardia [R00 0]  Bilateral flank pain [R10 9]  Age/Sex: 52 y o  female     Admission Orders:  Scheduled Medications:  calcium carbonate, 1 tablet, Oral, Daily With Breakfast  cefTRIAXone, 1,000 mg, Intravenous, Q24H  vitamin B-12, 1,000 mcg, Oral, Daily  heparin (porcine), 5,000 Units, Subcutaneous, Q8H MARTI  iron sucrose, 200 mg, Intravenous, Daily  multivitamin-minerals, 1 tablet, Oral, Daily  nicotine, 1 patch, Transdermal, Daily    ceftriaxone (ROCEPHIN) 1 g/50 mL in dextrose IVPB   Dose: 1,000 mg  Freq: Once Route: IV  Last Dose: Stopped (06/15/21 0306)  Start: 06/15/21 0130 End: 06/15/21 0306    Continuous IV Infusions:  sodium chloride, 100 mL/hr, Intravenous, Continuous      PRN Meds:  acetaminophen, 650 mg, Oral, Q6H PRN  albuterol, 2 puff, Inhalation, Q6H PRN  butalbital-acetaminophen-caffeine, 1 tablet, Oral, Q4H PRN  ketorolac, 15 mg, Intravenous, Q6H PRN  metoclopramide, 10 mg, Oral, Daily PRN  ondansetron, 4 mg, Intravenous, Q8H PRN      Bld culture x2    Network Utilization Review Department  ATTENTION: Please call with any questions or concerns to [phone] and carefully listen to the prompts so that you are directed to the right person  All voicemails are confidential   Willie Lloyd all requests for admission clinical reviews, approved or denied determinations and any other requests to dedicated fax number below belonging to the campus where the patient is receiving treatment   List of dedicated fax numbers for the Facilities:  [address] DENIALS (Administrative/Medical Necessity) [phone]   [address] (Maternity/NICU/Pediatrics) [phone] [address] [phone]   [address] Shanae Mroa 9370 [id number] [facility] [address] [place] [address] [facility] [address] [place] [address] [phone]

## 2021-06-16 NOTE — ASSESSMENT & PLAN NOTE
· Likely in the setting of gastric bypass/malabsorption  · Most recent iron panel 5/14: Fe 29, ferritin 4, TIBC 463, Fe sat% 6  · Will give Venofer  mg for 2 days, last day 6/17  · Most recent Hb of 7 4, stable  · Continue to monitor daily CBC, transfuse for Hb <7 0  · Patient expressed interest in receiving outpatient Fe infusions after discharge

## 2021-06-16 NOTE — UTILIZATION REVIEW
Inpatient Admission Authorization Request   NOTIFICATION OF INPATIENT ADMISSION/INPATIENT AUTHORIZATION REQUEST   SERVICING FACILITY:   [facility]  Address: [address] [place] [address], [address] [place] [address]  Tax ID: [id number]  NPI: [id number]  Place of Service: Inpatient [address] [facility]  [address]  Place of Service Code: 24     ATTENDING PROVIDER:  Attending Name and NPI#: Alejandrina George Md [[id number]]  Address: [address] [place] [address], [address] [place] [address]  Phone: [phone]     UTILIZATION REVIEW CONTACT:  Kymberly Chapa Utilization   Network Utilization Review Department  Phone: [phone]  Fax: [phone]  Email: [email or web address]     PHYSICIAN ADVISORY SERVICES:  FOR INAP-[id number]-ZWUO REVIEW - MEDICAL NECESSITY DENIAL  Phone: [phone]  Fax: [phone]  Email: [email or web address]     TYPE OF REQUEST:  Inpatient Status     ADMISSION INFORMATION:  ADMISSION DATE/TIME: 6/15/21 12:46 PM  PATIENT DIAGNOSIS CODE/DESCRIPTION:  Chest pain [R07 9]  Pyelonephritis [N12]  Tachycardia [R00 0]  Bilateral flank pain [R10 9]  DISCHARGE DATE/TIME: No discharge date for patient encounter  DISCHARGE DISPOSITION (IF DISCHARGED): Home/Self Care     IMPORTANT INFORMATION:  Please contact the Kymberly Chapa directly with any questions or concerns regarding this request  Department voicemails are confidential     Send requests for admission clinical reviews, concurrent reviews, approvals, and administrative denials due to lack of clinical to fax [phone]

## 2021-06-16 NOTE — PLAN OF CARE
Patient understands the importance of taking all medications as ordered to decrease risk of sepsis    Patient will call  for help when getting out of bed to avoid  falls

## 2021-06-16 NOTE — PLAN OF CARE
Problem: PAIN - ADULT  Goal: Verbalizes/displays adequate comfort level or baseline comfort level  Description: Interventions:  - Encourage patient to monitor pain and request assistance  - Assess pain using appropriate pain scale  - Administer analgesics based on type and severity of pain and evaluate response  - Implement non-pharmacological measures as appropriate and evaluate response  - Consider cultural and social influences on pain and pain management  - Notify physician/advanced practitioner if interventions unsuccessful or patient reports new pain  Outcome: Progressing     Problem: INFECTION - ADULT  Goal: Absence or prevention of progression during hospitalization  Description: INTERVENTIONS:  - Assess and monitor for signs and symptoms of infection  - Monitor lab/diagnostic results  - Monitor all insertion sites, i e  indwelling lines, tubes, and drains  - Monitor endotracheal if appropriate and nasal secretions for changes in amount and color  - [place] appropriate cooling/warming therapies per order  - Administer medications as ordered  - Instruct and encourage patient and family to use good hand hygiene technique  - Identify and instruct in appropriate isolation precautions for identified infection/condition  Outcome: Progressing  Goal: Absence of fever/infection during neutropenic period  Description: INTERVENTIONS:  - Monitor WBC    Outcome: Progressing     Problem: SAFETY ADULT  Goal: Patient will remain free of falls  Description: INTERVENTIONS:  - Educate patient/family on patient safety including physical limitations  - Instruct patient to call for assistance with activity   - Consult OT/PT to assist with strengthening/mobility   - Keep Call bell within reach  - Keep bed low and locked with side rails adjusted as appropriate  - Keep care items and personal belongings within reach  - Initiate and maintain comfort rounds  - Make Fall Risk Sign visible to staff  - Offer Toileting every 2 Hours, in advance of need  - Obtain necessary fall risk management equipment: yes  - Apply yellow socks and bracelet for high fall risk patients  - Consider moving patient to room near nurses station  Outcome: Progressing  Goal: Maintain or return to baseline ADL function  Description: INTERVENTIONS:  -  Assess patient's ability to carry out ADLs; assess patient's baseline for ADL function and identify physical deficits which impact ability to perform ADLs (bathing, care of mouth/teeth, toileting, grooming, dressing, etc )  - Assess/evaluate cause of self-care deficits   - Assess range of motion  - Assess patient's mobility; develop plan if impaired  - Assess patient's need for assistive devices and provide as appropriate  - Encourage maximum independence but intervene and supervise when necessary  - Involve family in performance of ADLs  - Assess for home care needs following discharge   - Consider OT consult to assist with ADL evaluation and planning for discharge  - Provide patient education as appropriate  Outcome: Progressing  Goal: Maintains/Returns to pre admission functional level  Description: INTERVENTIONS:  - Perform BMAT or MOVE assessment daily    - Set and communicate daily mobility goal to care team and patient/family/caregiver  - Collaborate with rehabilitation services on mobility goals if consulted  - Perform Range of Motion 3 times a day    - Ambulate patient 3 times a day  - Out of bed to chair 3 times a day   - Out of bed for meals 3 times a day  - Out of bed for toileting  - Record patient progress and toleration of activity level   Outcome: Progressing     Problem: DISCHARGE PLANNING  Goal: Discharge to home or other facility with appropriate resources  Description: INTERVENTIONS:  - Identify barriers to discharge w/patient and caregiver  - Arrange for needed discharge resources and transportation as appropriate  - Identify discharge learning needs (meds, wound care, etc )  - Arrange for interpretive services to assist at discharge as needed  - Refer to Case Management Department for coordinating discharge planning if the patient needs post-hospital services based on physician/advanced practitioner order or complex needs related to functional status, cognitive ability, or social support system  Outcome: Progressing     Problem: Knowledge Deficit  Goal: Patient/family/caregiver demonstrates understanding of disease process, treatment plan, medications, and discharge instructions  Description: Complete learning assessment and assess knowledge base    Interventions:  - Provide teaching at level of understanding  - Provide teaching via preferred learning methods  Outcome: Progressing

## 2021-06-16 NOTE — PROGRESS NOTES
INTERNAL MEDICINE RESIDENCY PROGRESS NOTE     Name: Thor Andrade   Age & Sex: 52 y o  female   MRN: [id number]  Unit/Bed#: [facility] 617-01   Encounter: [id number]  Team: SOD Team B     PATIENT INFORMATION     Name: Thor Andrade   Age & Sex: 52 y o  female   MRN: [id number]  Hospital Stay Days: 1    ASSESSMENT/PLAN     Principal Problem:    Sepsis ([facility] 75 )  Active Problems:    Asthma    Vitamin D deficiency    Iron deficiency anemia    Migraine without status migrainosus, not intractable    Pyelonephritis    Hypotension      * Sepsis ([facility] )  Assessment & Plan  POA  Tachycardia along with elevated WBC count  UA positive  Normal lactic acid and procalcitonin  CTA dissection protocol showed Multiple ill-defined hypoattenuating regions in the right kidney with associated perinephric stranding suspicious for acute pyelonephritis  Mild diffuse right ureteral wall thickening consistent with ureteritis likely related to ascending infection  Correlate with urinalysis  Plan:  · Continue IV ceftriaxone  Pending urine culture sensitivities   · Status post IV fluid bolus x3 and will continue maintenance fluids  · Blood culture - no growth at 24 hours  · Urine culture - G(-) rods, sensitivity pending  · Hold on to home clonidine given hypotensive episode in ER  · Continue to monitor renal function and fever curve daily  Hypotension  Assessment & Plan  · Patient had an episode of hypotension in ER likely in setting of sepsis  · Continue close monitoring of blood pressures  · Baseline systolic pressures in 048'I  · Continue maintenance fluids, bolus with 1 L NS for hypotensive episodes    Pyelonephritis  Assessment & Plan  · See sepsis section for more detail  Migraine without status migrainosus, not intractable  Assessment & Plan  · Continue home p r n  Fioricet and Reglan      Iron deficiency anemia  Assessment & Plan  · Likely in the setting of gastric bypass/malabsorption  · Most recent iron panel 5/14: Fe 29, ferritin 4, TIBC 463, Fe sat% 6  · Will give Venofer  mg for 2 days  · Continue to monitor daily CBC, transfuse for Hb <7 0      Vitamin D deficiency  Assessment & Plan  · History of gastric bypass in 2018  · Continue vitamin-D supplements     Asthma  Assessment & Plan  · One episode of SOB on 6/15, quickly resolved  Advised to notify nurse if further episodes occur  · Continue home p r n  albuterol  Disposition: Continue inpatient management for IV antibiotics pending urine culture results  Will plan to transition to oral antibiotics once sensitivity obtained  SUBJECTIVE     Patient seen and examined  No acute events overnight  This morning, Bethany Swanson reports feeling much better than yesterday  She reports that her pain is improved to 6/10 with no pain medications and is only on the right flank  She still denies dysuria or increased frequency but does note feeling like she is unable to empty her bladder completely  She also reports one episode of SOB last night that quickly passed  She has a history of asthma and was counseled to make nursing aware of any further episodes  She continues to have unmimproved orthostatic hypotension  She denies HA, chest pain, abdominal pain, N/V       OBJECTIVE     Vitals:    21 0330 21 0714 21 0733 21 1059   BP: (!) 90/48 94/59  110/60   BP Location: Left arm      Pulse:  89 80 84   Resp:  17 18 17   Temp:  99 1 °F (37 3 °C)  99 1 °F (37 3 °C)   TempSrc:       SpO2:  96% 95% 95%   Weight:       Height:          Temperature:   Temp (24hrs), Av °F (37 2 °C), Min:98 5 °F (36 9 °C), Max:100 °F (37 8 °C)    Temperature: 99 1 °F (37 3 °C)  Intake & Output:  I/O        07 - 06/15 0700 06/15 07 -  0700  07 -  0700    P  O    480    I V  (mL/kg) 1000 (11 9)  400 (4 8)    IV Piggyback 50 1000     Total Intake(mL/kg) 1050 (12 5) 1000 (11 9) 880 (10 5)    Urine (mL/kg/hr)   950 (3 2)    Total Output   950    Net +1050 +1000 -70 Weights:   IBW (Ideal Body Weight): 57 kg    Body mass index is 30 79 kg/m²  Weight (last 2 days)     Date/Time   Weight    06/14/21 2036   83 9 (185)            Physical Exam  Vitals reviewed  Constitutional:       General: She is not in acute distress  Cardiovascular:      Rate and Rhythm: Normal rate and regular rhythm  Pulses: Normal pulses  Heart sounds: Normal heart sounds  Pulmonary:      Effort: Pulmonary effort is normal       Breath sounds: Normal breath sounds  Abdominal:      General: Abdomen is flat  Bowel sounds are normal       Palpations: Abdomen is soft  Tenderness: There is no abdominal tenderness  There is right CVA tenderness  There is no left CVA tenderness  Musculoskeletal:      Right lower leg: No edema  Left lower leg: No edema  Skin:     Capillary Refill: Capillary refill takes less than 2 seconds  Neurological:      Mental Status: She is alert  LABORATORY DATA     Labs: I have personally reviewed pertinent reports  Results from last 7 days   Lab Units 06/16/21  0454 06/15/21  0522 06/14/21  2048   WBC Thousand/uL 10 88* 16 32* 18 37*   HEMOGLOBIN g/dL 7 5* 8 0* 9 4*   HEMATOCRIT % 24 8* 26 7* 30 3*   PLATELETS Thousands/uL 184 191 254   NEUTROS PCT % 80* 85* 88*   MONOS PCT % 6 6 5      Results from last 7 days   Lab Units 06/16/21  0454 06/15/21  0522 06/14/21  2048   POTASSIUM mmol/L 3 6 3 5 3 6   CHLORIDE mmol/L 110* 106 104   CO2 mmol/L 25 25 21   BUN mg/dL 6 7 7   CREATININE mg/dL 0 47* 0 62 0 70   CALCIUM mg/dL 7 7* 7 9* 8 9   ALK PHOS U/L  --   --  87   ALT U/L  --   --  14   AST U/L  --   --  11                  Results from last 7 days   Lab Units 06/14/21  2217   LACTIC ACID mmol/L 1 4     Results from last 7 days   Lab Units 06/14/21  2048   TROPONIN I ng/mL <0 02       IMAGING & DIAGNOSTIC TESTING     Radiology Results: I have personally reviewed pertinent reports    XR chest 1 view portable    Result Date: 6/15/2021  Impression: No acute cardiopulmonary disease  Workstation performed: [id number]     CTA dissection protocol chest/abdomen/pelvis    Result Date: 6/15/2021  Impression: 1  No evidence of aortic dissection, intramural hematoma, or aortic aneurysm  2   Multiple ill-defined hypoattenuating regions in the right kidney with associated perinephric stranding suspicious for acute pyelonephritis  3   Mild diffuse right ureteral wall thickening consistent with ureteritis likely related to ascending infection  Correlate with urinalysis  4   Hepatic steatosis  The study was marked in [facility] for immediate notification  Workstation performed: [id number]     Other Diagnostic Testing: I have personally reviewed pertinent reports      ACTIVE MEDICATIONS     Current Facility-Administered Medications   Medication Dose Route Frequency    acetaminophen (TYLENOL) tablet 650 mg  650 mg Oral Q6H PRN    albuterol (PROVENTIL HFA,VENTOLIN HFA) inhaler 2 puff  2 puff Inhalation Q6H PRN    butalbital-acetaminophen-caffeine (FIORICET,ESGIC) -40 mg per tablet 1 tablet  1 tablet Oral Q4H PRN    calcium carbonate (OYSTER SHELL,OSCAL) 500 mg tablet 1 tablet  1 tablet Oral Daily With Breakfast    ceftriaxone (ROCEPHIN) 1 g/50 mL in dextrose IVPB  1,000 mg Intravenous Q24H    cyanocobalamin (VITAMIN B-12) tablet 1,000 mcg  1,000 mcg Oral Daily    heparin (porcine) subcutaneous injection 5,000 Units  5,000 Units Subcutaneous Q8H [facility]    iron sucrose (VENOFER) 200 mg in sodium chloride 0 9 % 100 mL IVPB  200 mg Intravenous Daily    ketorolac (TORADOL) injection 15 mg  15 mg Intravenous Q6H PRN    metoclopramide (REGLAN) tablet 10 mg  10 mg Oral Daily PRN    multivitamin-minerals (CENTRUM) tablet 1 tablet  1 tablet Oral Daily    nicotine (NICODERM CQ) 14 mg/24hr TD 24 hr patch 1 patch  1 patch Transdermal Daily    ondansetron (ZOFRAN) injection 4 mg  4 mg Intravenous Q8H PRN    sodium chloride 0 9 % infusion  100 mL/hr Intravenous Continuous VTE Pharmacologic Prophylaxis: Heparin  VTE Mechanical Prophylaxis: sequential compression device    Portions of the record may have been created with voice recognition software  Occasional wrong word or "sound a like" substitutions may have occurred due to the inherent limitations of voice recognition software    Read the chart carefully and recognize, using context, where substitutions have occurred   ==  [address] [facility]  MS4

## 2021-06-17 ENCOUNTER — TRANSITIONAL CARE MANAGEMENT (OUTPATIENT)
Dept: INTERNAL MEDICINE CLINIC | Facility: CLINIC | Age: 51
End: 2021-06-17

## 2021-06-17 VITALS
SYSTOLIC BLOOD PRESSURE: 101 MMHG | TEMPERATURE: 98.3 F | OXYGEN SATURATION: 96 % | WEIGHT: 185 LBS | HEART RATE: 74 BPM | HEIGHT: 65 IN | BODY MASS INDEX: 30.82 KG/M2 | DIASTOLIC BLOOD PRESSURE: 60 MMHG | RESPIRATION RATE: 16 BRPM

## 2021-06-17 PROBLEM — A41.9 SEPSIS (HCC): Status: RESOLVED | Noted: 2021-06-15 | Resolved: 2021-06-17

## 2021-06-17 PROBLEM — I95.9 HYPOTENSION: Status: RESOLVED | Noted: 2021-06-15 | Resolved: 2021-06-17

## 2021-06-17 LAB
BACTERIA UR CULT: ABNORMAL
BASOPHILS # BLD AUTO: 0.03 THOUSANDS/ÂΜL (ref 0–0.1)
BASOPHILS NFR BLD AUTO: 1 % (ref 0–1)
EOSINOPHIL # BLD AUTO: 0.09 THOUSAND/ÂΜL (ref 0–0.61)
EOSINOPHIL NFR BLD AUTO: 2 % (ref 0–6)
ERYTHROCYTE [DISTWIDTH] IN BLOOD BY AUTOMATED COUNT: 16.6 % (ref 11.6–15.1)
HCT VFR BLD AUTO: 24.3 % (ref 34.8–46.1)
HGB BLD-MCNC: 7.4 G/DL (ref 11.5–15.4)
IMM GRANULOCYTES # BLD AUTO: 0.03 THOUSAND/UL (ref 0–0.2)
IMM GRANULOCYTES NFR BLD AUTO: 1 % (ref 0–2)
LYMPHOCYTES # BLD AUTO: 1.47 THOUSANDS/ÂΜL (ref 0.6–4.47)
LYMPHOCYTES NFR BLD AUTO: 26 % (ref 14–44)
MCH RBC QN AUTO: 23.7 PG (ref 26.8–34.3)
MCHC RBC AUTO-ENTMCNC: 30.5 G/DL (ref 31.4–37.4)
MCV RBC AUTO: 78 FL (ref 82–98)
MONOCYTES # BLD AUTO: 0.57 THOUSAND/ÂΜL (ref 0.17–1.22)
MONOCYTES NFR BLD AUTO: 10 % (ref 4–12)
NEUTROPHILS # BLD AUTO: 3.53 THOUSANDS/ÂΜL (ref 1.85–7.62)
NEUTS SEG NFR BLD AUTO: 60 % (ref 43–75)
NRBC BLD AUTO-RTO: 0 /100 WBCS
PLATELET # BLD AUTO: 186 THOUSANDS/UL (ref 149–390)
PMV BLD AUTO: 11.9 FL (ref 8.9–12.7)
RBC # BLD AUTO: 3.12 MILLION/UL (ref 3.81–5.12)
WBC # BLD AUTO: 5.72 THOUSAND/UL (ref 4.31–10.16)

## 2021-06-17 PROCEDURE — 85025 COMPLETE CBC W/AUTO DIFF WBC: CPT | Performed by: STUDENT IN AN ORGANIZED HEALTH CARE EDUCATION/TRAINING PROGRAM

## 2021-06-17 RX ORDER — ONDANSETRON 4 MG/1
4 TABLET, ORALLY DISINTEGRATING ORAL EVERY 6 HOURS PRN
Qty: 20 TABLET | Refills: 0 | Status: SHIPPED | OUTPATIENT
Start: 2021-06-17 | End: 2021-08-02

## 2021-06-17 RX ORDER — ACETAMINOPHEN 325 MG/1
650 TABLET ORAL EVERY 6 HOURS PRN
Qty: 30 TABLET | Refills: 0 | Status: SHIPPED | OUTPATIENT
Start: 2021-06-17 | End: 2022-04-12

## 2021-06-17 RX ORDER — CEPHALEXIN 500 MG/1
500 CAPSULE ORAL EVERY 6 HOURS SCHEDULED
Qty: 28 CAPSULE | Refills: 0 | Status: SHIPPED | OUTPATIENT
Start: 2021-06-17 | End: 2021-06-24

## 2021-06-17 RX ORDER — CEPHALEXIN 500 MG/1
500 CAPSULE ORAL EVERY 6 HOURS SCHEDULED
Status: DISCONTINUED | OUTPATIENT
Start: 2021-06-17 | End: 2021-06-17 | Stop reason: HOSPADM

## 2021-06-17 RX ORDER — FERROUS SULFATE 324(65)MG
324 TABLET, DELAYED RELEASE (ENTERIC COATED) ORAL
Qty: 60 TABLET | Refills: 0 | Status: SHIPPED | OUTPATIENT
Start: 2021-06-17 | End: 2021-07-12 | Stop reason: SDUPTHER

## 2021-06-17 RX ADMIN — IRON SUCROSE 200 MG: 20 INJECTION, SOLUTION INTRAVENOUS at 08:51

## 2021-06-17 RX ADMIN — SODIUM CHLORIDE 100 ML/HR: 0.9 INJECTION, SOLUTION INTRAVENOUS at 01:19

## 2021-06-17 RX ADMIN — HEPARIN SODIUM 5000 UNITS: 5000 INJECTION INTRAVENOUS; SUBCUTANEOUS at 06:02

## 2021-06-17 RX ADMIN — ONDANSETRON 4 MG: 2 INJECTION INTRAMUSCULAR; INTRAVENOUS at 10:01

## 2021-06-17 RX ADMIN — CEFTRIAXONE SODIUM 1000 MG: 10 INJECTION, POWDER, FOR SOLUTION INTRAVENOUS at 01:02

## 2021-06-17 RX ADMIN — Medication 1 TABLET: at 08:42

## 2021-06-17 RX ADMIN — CYANOCOBALAMIN TAB 500 MCG 1000 MCG: 500 TAB at 08:42

## 2021-06-17 RX ADMIN — CEPHALEXIN 500 MG: 500 CAPSULE ORAL at 11:31

## 2021-06-17 RX ADMIN — CALCIUM 1 TABLET: 500 TABLET ORAL at 08:42

## 2021-06-17 NOTE — PLAN OF CARE
Problem: PAIN - ADULT  Goal: Verbalizes/displays adequate comfort level or baseline comfort level  Description: Interventions:  - Encourage patient to monitor pain and request assistance  - Assess pain using appropriate pain scale  - Administer analgesics based on type and severity of pain and evaluate response  - Implement non-pharmacological measures as appropriate and evaluate response  - Consider cultural and social influences on pain and pain management  - Notify physician/advanced practitioner if interventions unsuccessful or patient reports new pain  Outcome: Progressing     Problem: INFECTION - ADULT  Goal: Absence or prevention of progression during hospitalization  Description: INTERVENTIONS:  - Assess and monitor for signs and symptoms of infection  - Monitor lab/diagnostic results  - Monitor all insertion sites, i e  indwelling lines, tubes, and drains  - Monitor endotracheal if appropriate and nasal secretions for changes in amount and color  - [place] appropriate cooling/warming therapies per order  - Administer medications as ordered  - Instruct and encourage patient and family to use good hand hygiene technique  - Identify and instruct in appropriate isolation precautions for identified infection/condition  Outcome: Progressing  Goal: Absence of fever/infection during neutropenic period  Description: INTERVENTIONS:  - Monitor WBC    Outcome: Progressing     Problem: SAFETY ADULT  Goal: Patient will remain free of falls  Description: INTERVENTIONS:  - Educate patient/family on patient safety including physical limitations  - Instruct patient to call for assistance with activity   - Consult OT/PT to assist with strengthening/mobility   - Keep Call bell within reach  - Keep bed low and locked with side rails adjusted as appropriate  - Keep care items and personal belongings within reach  - Initiate and maintain comfort rounds  - Make Fall Risk Sign visible to staff  - Offer Toileting every 2 Hours, in advance of need  - Obtain necessary fall risk management equipment: yes  - Apply yellow socks and bracelet for high fall risk patients  - Consider moving patient to room near nurses station  Outcome: Progressing  Goal: Maintain or return to baseline ADL function  Description: INTERVENTIONS:  -  Assess patient's ability to carry out ADLs; assess patient's baseline for ADL function and identify physical deficits which impact ability to perform ADLs (bathing, care of mouth/teeth, toileting, grooming, dressing, etc )  - Assess/evaluate cause of self-care deficits   - Assess range of motion  - Assess patient's mobility; develop plan if impaired  - Assess patient's need for assistive devices and provide as appropriate  - Encourage maximum independence but intervene and supervise when necessary  - Involve family in performance of ADLs  - Assess for home care needs following discharge   - Consider OT consult to assist with ADL evaluation and planning for discharge  - Provide patient education as appropriate  Outcome: Progressing  Goal: Maintains/Returns to pre admission functional level  Description: INTERVENTIONS:  - Perform BMAT or MOVE assessment daily    - Set and communicate daily mobility goal to care team and patient/family/caregiver  - Collaborate with rehabilitation services on mobility goals if consulted  - Perform Range of Motion 3 times a day  - Reposition patient every 2 hours    - Dangle patient 3 times a day  - Stand patient 3 times a day  - Ambulate patient 3 times a day  - Out of bed to chair 3 times a day   - Out of bed for meals 3 times a day  - Out of bed for toileting  - Record patient progress and toleration of activity level   Outcome: Progressing     Problem: DISCHARGE PLANNING  Goal: Discharge to home or other facility with appropriate resources  Description: INTERVENTIONS:  - Identify barriers to discharge w/patient and caregiver  - Arrange for needed discharge resources and transportation as appropriate  - Identify discharge learning needs (meds, wound care, etc )  - Arrange for interpretive services to assist at discharge as needed  - Refer to Case Management Department for coordinating discharge planning if the patient needs post-hospital services based on physician/advanced practitioner order or complex needs related to functional status, cognitive ability, or social support system  Outcome: Progressing     Problem: Knowledge Deficit  Goal: Patient/family/caregiver demonstrates understanding of disease process, treatment plan, medications, and discharge instructions  Description: Complete learning assessment and assess knowledge base    Interventions:  - Provide teaching at level of understanding  - Provide teaching via preferred learning methods  Outcome: Progressing

## 2021-06-17 NOTE — DISCHARGE SUMMARY
[facility] Discharge Summary - Medical Marisa Philippe 52 y o  female MRN: [id number]    [address] [place] [address] Room / Bed: [facility] 617/[facility] 722-59 Encounter: [id number]    BRIEF OVERVIEW    Admitting Provider: Iggy Jolly MD  Discharge Provider: Iggy Jolly MD  Primary Care Physician at Discharge: Dr Ash Baltazar    Discharge To: Home     Admission Date: 6/14/2021     Discharge Date: 06/17/2021      Primary Discharge Diagnosis  Principal Problem (Resolved):    Sepsis ([address] )  Active Problems:    Asthma    Vitamin D deficiency    Iron deficiency anemia    Migraine without status migrainosus, not intractable    Pyelonephritis  Resolved Problems:    Hypotension      Other Problems Addressed: None     Consulting Providers   None         Therapeutic Operative Procedures Performed  None    Diagnostic Procedures Performed  None     Discharge Disposition: Home/Self Care  Discharged With Lines: no    Test Results Pending at Discharge: no     Outpatient Follow-Up  yes  Post hospitalization follow-up with PCP within 1-2 weeks  Follow up with consulting providers  none required   Active Issues Requiring Follow-up   yes     Issue: Iron deficiency anemia, pyelonephritis    What is Needed: CBC prior to follow-up 1 week after discharge  Follow-up Appointments Arranged: No       Code Status: Level 1 - Full Code  Advance Directive and Living Will: <no information>  Power of :    POLST:      Medications   See after visit summary for reconciled discharge medications provided to patient and family  Allergies  No Known Allergies  Discharge Diet: regular diet  Activity restrictions: none    [address]  Marisa Philippe is a 52year old female with PMH of recurrent UTI, gastric bypass in past, iron deficiency anemia, asthma  She initially presented on the evening of 06/14 to [facility] ED with 1 week history of bilateral back and abdominal pain   She also complained of chest pain that radiated down the midline to the groin  On initial presentation, she was tachycardic, tachypnic, and hypotensive with SBP in the 70's  Leukocytosis was also present  CTA showed multiple hypoattenuating regions and perinephric stranding in the R kidney consistent with acute pyelonephritis  Blood (x2) and urine cultures were obtained in the ED and IV ceftriaxone was started  Blood pressures stabilized with fluid boluses  She was admitted for sepsis secondary to pyelonephritis  Throughout her admission, she continued to improve clinically  Her blood pressures stabilized with SBP in the 100's-110's  During her admission, she was also found to have microcytic anemia likely due to iron deficiency post gastric bypass  This correlated with recent outpatient iron studies and she was given 2 doses of IV venofer  Urine culture returned positive for pansensitive E  Coli and she was transitioned to oral keflex 500 mg Q6  She is to continue oral keflex for a total of 10 days (last dose will be 06/24)  On the day of discharge, the patient was seen and examined at bedside  Complains of 6/10 headache typical for migraine  Flank pain has resolved, she denies dysuria, fevers, chills  Visit Vitals  /60   Pulse 74   Temp 98 3 °F (36 8 °C)   Resp 16   Ht 5' 5" (1 651 m)   Wt 83 9 kg (185 lb)   SpO2 96%   BMI 30 79 kg/m²   OB Status Having periods   Smoking Status Current Some Day Smoker   BSA 1 91 m²       Physical Exam  Vitals and nursing note reviewed  Constitutional:       General: She is not in acute distress  Appearance: She is well-developed  HENT:      Head: Normocephalic and atraumatic  Eyes:      Conjunctiva/sclera: Conjunctivae normal    Cardiovascular:      Rate and Rhythm: Normal rate and regular rhythm  Heart sounds: No murmur heard  Pulmonary:      Effort: Pulmonary effort is normal  No respiratory distress  Breath sounds: Normal breath sounds     Abdominal:      Palpations: Abdomen is soft  Tenderness: There is no abdominal tenderness  Genitourinary:     Comments: Mild right costovertebral tenderness  Musculoskeletal:      Cervical back: Neck supple  Skin:     General: Skin is warm and dry  Neurological:      General: No focal deficit present  Mental Status: She is alert and oriented to person, place, and time  Presenting Problem/History of Present Illness  Principal Problem (Resolved):    Sepsis ([facility] )  Active Problems:    Asthma    Vitamin D deficiency    Iron deficiency anemia    Migraine without status migrainosus, not intractable    Pyelonephritis  Resolved Problems:    Hypotension        Other Pertinent Test Results  None     Discharge Condition: good      Discharge  Statement   I spent 30 minutes minutes discharging the patient  This time was spent on the day of discharge  I had direct contact with the patient on the day of discharge  Additional documentation is required if more than 30 minutes were spent on discharge         Ann Iniguez MD  Internal Medicine Residency   PGY II

## 2021-06-17 NOTE — PLAN OF CARE
Problem: PAIN - ADULT  Goal: Verbalizes/displays adequate comfort level or baseline comfort level  Description: Interventions:  - Encourage patient to monitor pain and request assistance  - Assess pain using appropriate pain scale  - Administer analgesics based on type and severity of pain and evaluate response  - Implement non-pharmacological measures as appropriate and evaluate response  - Consider cultural and social influences on pain and pain management  - Notify physician/advanced practitioner if interventions unsuccessful or patient reports new pain  6/17/2021 1319 by Eleni Orellana RN  Outcome: Adequate for Discharge  6/17/2021 0846 by Eleni Orellana RN  Outcome: Progressing     Problem: INFECTION - ADULT  Goal: Absence or prevention of progression during hospitalization  Description: INTERVENTIONS:  - Assess and monitor for signs and symptoms of infection  - Monitor lab/diagnostic results  - Monitor all insertion sites, i e  indwelling lines, tubes, and drains  - Monitor endotracheal if appropriate and nasal secretions for changes in amount and color  - [place] appropriate cooling/warming therapies per order  - Administer medications as ordered  - Instruct and encourage patient and family to use good hand hygiene technique  - Identify and instruct in appropriate isolation precautions for identified infection/condition  6/17/2021 1319 by Eleni Orellana RN  Outcome: Adequate for Discharge  6/17/2021 0846 by Eleni Orellana RN  Outcome: Progressing  Goal: Absence of fever/infection during neutropenic period  Description: INTERVENTIONS:  - Monitor WBC    6/17/2021 1319 by Eleni Orellana RN  Outcome: Adequate for Discharge  6/17/2021 0846 by Eleni Orellana RN  Outcome: Progressing     Problem: SAFETY ADULT  Goal: Patient will remain free of falls  Description: INTERVENTIONS:  - Educate patient/family on patient safety including physical limitations  - Instruct patient to call for assistance with activity   - Consult OT/PT to assist with strengthening/mobility   - Keep Call bell within reach  - Keep bed low and locked with side rails adjusted as appropriate  - Keep care items and personal belongings within reach  - Initiate and maintain comfort rounds  - Make Fall Risk Sign visible to staff  - Offer Toileting every 2 Hours, in advance of need  - - Apply yellow socks and bracelet for high fall risk patients  - Consider moving patient to room near nurses station  6/17/2021 1319 by Azar Albarado RN  Outcome: Adequate for Discharge  6/17/2021 0846 by Azar Albarado RN  Outcome: Progressing  Goal: Maintain or return to baseline ADL function  Description: INTERVENTIONS:  -  Assess patient's ability to carry out ADLs; assess patient's baseline for ADL function and identify physical deficits which impact ability to perform ADLs (bathing, care of mouth/teeth, toileting, grooming, dressing, etc )  - Assess/evaluate cause of self-care deficits   - Assess range of motion  - Assess patient's mobility; develop plan if impaired  - Assess patient's need for assistive devices and provide as appropriate  - Encourage maximum independence but intervene and supervise when necessary  - Involve family in performance of ADLs  - Assess for home care needs following discharge   - Consider OT consult to assist with ADL evaluation and planning for discharge  - Provide patient education as appropriate  6/17/2021 1319 by Azar Albarado RN  Outcome: Adequate for Discharge  6/17/2021 0846 by Azar Albarado RN  Outcome: Progressing  Goal: Maintains/Returns to pre admission functional level  Description: INTERVENTIONS:  - Perform BMAT or MOVE assessment daily    - Set and communicate daily mobility goal to care team and patient/family/caregiver  - Collaborate with rehabilitation services on mobility goals if consulted  - Perform Range of Motion 3 times a day  - Reposition patient every 3 hours    - Dangle patient 3 times a day  - Stand patient 3 times a day  - Ambulate patient 3 times a day  - Out of bed to chair 3 times a day   - Out of bed for meals 3 times a day  - Out of bed for toileting  - Record patient progress and toleration of activity level   6/17/2021 1319 by Olivier Vaughn RN  Outcome: Adequate for Discharge  6/17/2021 0846 by Olivier Vaughn RN  Outcome: Progressing     Problem: DISCHARGE PLANNING  Goal: Discharge to home or other facility with appropriate resources  Description: INTERVENTIONS:  - Identify barriers to discharge w/patient and caregiver  - Arrange for needed discharge resources and transportation as appropriate  - Identify discharge learning needs (meds, wound care, etc )  - Arrange for interpretive services to assist at discharge as needed  - Refer to Case Management Department for coordinating discharge planning if the patient needs post-hospital services based on physician/advanced practitioner order or complex needs related to functional status, cognitive ability, or social support system  6/17/2021 1319 by Olivier Vaughn RN  Outcome: Adequate for Discharge  6/17/2021 0846 by Olivier Vaughn RN  Outcome: Progressing     Problem: Knowledge Deficit  Goal: Patient/family/caregiver demonstrates understanding of disease process, treatment plan, medications, and discharge instructions  Description: Complete learning assessment and assess knowledge base    Interventions:  - Provide teaching at level of understanding  - Provide teaching via preferred learning methods  6/17/2021 1319 by Olivier Vaughn RN  Outcome: Adequate for Discharge  6/17/2021 0846 by Olivier Vaughn RN  Outcome: Progressing

## 2021-06-17 NOTE — DISCHARGE INSTRUCTIONS
Kidney Infection   WHAT YOU NEED TO KNOW:   A kidney infection, or pyelonephritis, is a bacterial infection  The infection usually starts in your bladder or urethra and moves into your kidney  One or both kidneys may be infected  DISCHARGE INSTRUCTIONS:   Return to the emergency department if:   · You have a fever and chills  · You cannot stop vomiting  · You have severe pain in your abdomen, lower back, or sides  Contact your healthcare provider if:   · You continue to have a fever after you take antibiotics for 3 days  · You have pain when you urinate, even after treatment  · Your signs and symptoms return  · You have questions or concerns about your condition or care  Medicines: You may  need any of the following:  · Antibiotics  treat your bacterial infection  · Acetaminophen  decreases pain and fever  It is available without a doctor's order  Ask how much to take and how often to take it  Follow directions  Read the labels of all other medicines you are using to see if they also contain acetaminophen, or ask your doctor or pharmacist  Acetaminophen can cause liver damage if not taken correctly  Do not use more than 4 grams (4,000 milligrams) total of acetaminophen in one day  · NSAIDs , such as ibuprofen, help decrease swelling, pain, and fever  This medicine is available with or without a doctor's order  NSAIDs can cause stomach bleeding or kidney problems in certain people  If you take blood thinner medicine, always ask if NSAIDs are safe for you  Always read the medicine label and follow directions  Do not give these medicines to children under 10months of age without direction from your child's healthcare provider  · Prescription pain medicine  may be given  Ask how to take this medicine safely  · Take your medicine as directed  Contact your healthcare provider if you think your medicine is not helping or if you have side effects   Tell him of her if you are allergic to any medicine  Keep a list of the medicines, vitamins, and herbs you take  Include the amounts, and when and why you take them  Bring the list or the pill bottles to follow-up visits  Carry your medicine list with you in case of an emergency  Drink liquids as directed: You may need to drink extra liquids to help flush your kidneys and urinary system  Water is the best liquid to drink  Ask your healthcare provider how much liquid to drink each day and which liquids are best for you  Urinate as soon as you feel the urge: This will help flush bacteria from your urinary system  Do not wait or hold your urine for too long  Clean your genital area every day with soap and water:  Wipe from front to back after you urinate or have a bowel movement  Wear cotton underwear  Fabrics such as nylon and polyester can stay damp  This can increase your risk for infection  Urinate within 15 minutes after you have sex  Follow up with your healthcare provider as directed:  Write down your questions so you remember to ask them during your visits  © Copyright [address] Information is for End User's use only and may not be sold, redistributed or otherwise used for commercial purposes  All illustrations and images included in CareNotes® are the copyrighted property of [employer]  or [facility] Hemant Alatorre   The above information is an  only  It is not intended as medical advice for individual conditions or treatments  Talk to your doctor, nurse or pharmacist before following any medical regimen to see if it is safe and effective for you

## 2021-06-18 ENCOUNTER — OFFICE VISIT (OUTPATIENT)
Dept: INTERNAL MEDICINE CLINIC | Facility: CLINIC | Age: 51
End: 2021-06-18
Payer: COMMERCIAL

## 2021-06-18 VITALS
HEIGHT: 65 IN | BODY MASS INDEX: 31.49 KG/M2 | TEMPERATURE: 98.3 F | SYSTOLIC BLOOD PRESSURE: 105 MMHG | HEART RATE: 83 BPM | WEIGHT: 189 LBS | DIASTOLIC BLOOD PRESSURE: 66 MMHG | OXYGEN SATURATION: 99 %

## 2021-06-18 DIAGNOSIS — N13.6 PYONEPHROSIS: Primary | ICD-10-CM

## 2021-06-18 DIAGNOSIS — K91.2 POSTOPERATIVE MALABSORPTION: ICD-10-CM

## 2021-06-18 DIAGNOSIS — J45.40 MODERATE PERSISTENT ASTHMA WITHOUT COMPLICATION: ICD-10-CM

## 2021-06-18 DIAGNOSIS — A41.51 SEPSIS DUE TO ESCHERICHIA COLI, UNSPECIFIED WHETHER ACUTE ORGAN DYSFUNCTION PRESENT (HCC): ICD-10-CM

## 2021-06-18 DIAGNOSIS — D50.9 IRON DEFICIENCY ANEMIA, UNSPECIFIED IRON DEFICIENCY ANEMIA TYPE: ICD-10-CM

## 2021-06-18 PROCEDURE — 1111F DSCHRG MED/CURRENT MED MERGE: CPT | Performed by: INTERNAL MEDICINE

## 2021-06-18 PROCEDURE — 99496 TRANSJ CARE MGMT HIGH F2F 7D: CPT | Performed by: INTERNAL MEDICINE

## 2021-06-18 PROCEDURE — 3008F BODY MASS INDEX DOCD: CPT | Performed by: INTERNAL MEDICINE

## 2021-06-18 NOTE — ASSESSMENT & PLAN NOTE
She was admitted to the hospital with urosepsis and given IV antibiotics  Symptoms have resolved  Currently still taking oral antibiotic cephalexin every 6 hours

## 2021-06-18 NOTE — UTILIZATION REVIEW
Notification of Discharge   This is a Notification of Discharge from our facility [address]  Please be advised that this patient has been discharge from our facility  Below you will find the admission and discharge date and time including the patients disposition  UTILIZATION REVIEW CONTACT:  Zonia Beltran  Utilization   Network Utilization Review Department  Phone: [phone] x carefully listen to the prompts  All voicemails are confidential   Email: [email or web address]     PHYSICIAN ADVISORY SERVICES:  FOR [id number] REVIEW - MEDICAL NECESSITY DENIAL  Phone: [phone]  Fax: [phone]  Email: [email or web address]     PRESENTATION DATE: 6/14/2021  9:45 PM  OBERVATION ADMISSION DATE:   INPATIENT ADMISSION DATE: 6/15/21 12:46 PM   DISCHARGE DATE: 6/17/2021  4:23 PM  DISPOSITION: Home/Self Care Home/Self Care      IMPORTANT INFORMATION:  Send all requests for admission clinical reviews, approved or denied determinations and any other requests to dedicated fax number below belonging to the campus where the patient is receiving treatment   List of dedicated fax numbers:  [address] DENIALS (Administrative/Medical Necessity) [phone]   [address] (Maternity/NICU/Pediatrics) [phone]   [facility] [phone]   Beronica Ambriz [phone]   [facility] [phone]   [facility] [address] [place] [address] [phone]   [facility]  [phone]   [address] [place] [address], [facility]  [address] [facility] [address] [facility] [phone]

## 2021-06-20 LAB
BACTERIA BLD CULT: NORMAL
BACTERIA BLD CULT: NORMAL

## 2021-07-09 DIAGNOSIS — D50.9 IRON DEFICIENCY ANEMIA, UNSPECIFIED IRON DEFICIENCY ANEMIA TYPE: ICD-10-CM

## 2021-07-12 RX ORDER — FERROUS SULFATE TAB EC 324 MG (65 MG FE EQUIVALENT) 324 (65 FE) MG
324 TABLET DELAYED RESPONSE ORAL
Qty: 60 TABLET | Refills: 0 | OUTPATIENT
Start: 2021-07-12

## 2021-07-12 RX ORDER — FERROUS SULFATE TAB EC 324 MG (65 MG FE EQUIVALENT) 324 (65 FE) MG
324 TABLET DELAYED RESPONSE ORAL
Qty: 60 TABLET | Refills: 0 | Status: SHIPPED | OUTPATIENT
Start: 2021-07-12 | End: 2021-11-30

## 2021-07-27 DIAGNOSIS — N13.6 PYONEPHROSIS: ICD-10-CM

## 2021-07-28 RX ORDER — ERGOCALCIFEROL 1.25 MG/1
CAPSULE ORAL
Qty: 8 CAPSULE | Refills: 3 | Status: SHIPPED | OUTPATIENT
Start: 2021-07-28 | End: 2021-12-22

## 2021-08-02 ENCOUNTER — OFFICE VISIT (OUTPATIENT)
Dept: INTERNAL MEDICINE CLINIC | Facility: CLINIC | Age: 51
End: 2021-08-02
Payer: COMMERCIAL

## 2021-08-02 VITALS
OXYGEN SATURATION: 98 % | BODY MASS INDEX: 31.82 KG/M2 | SYSTOLIC BLOOD PRESSURE: 110 MMHG | TEMPERATURE: 98.3 F | HEART RATE: 74 BPM | HEIGHT: 65 IN | DIASTOLIC BLOOD PRESSURE: 78 MMHG | WEIGHT: 191 LBS

## 2021-08-02 DIAGNOSIS — Z11.59 NEED FOR HEPATITIS C SCREENING TEST: ICD-10-CM

## 2021-08-02 DIAGNOSIS — G43.909 MIGRAINE WITHOUT STATUS MIGRAINOSUS, NOT INTRACTABLE, UNSPECIFIED MIGRAINE TYPE: ICD-10-CM

## 2021-08-02 DIAGNOSIS — D50.9 IRON DEFICIENCY ANEMIA, UNSPECIFIED IRON DEFICIENCY ANEMIA TYPE: Primary | ICD-10-CM

## 2021-08-02 DIAGNOSIS — N30.00 ACUTE CYSTITIS WITHOUT HEMATURIA: ICD-10-CM

## 2021-08-02 DIAGNOSIS — Z03.818 ENCOUNTER FOR OBSERVATION FOR SUSPECTED EXPOSURE TO OTHER BIOLOGICAL AGENTS RULED OUT: ICD-10-CM

## 2021-08-02 DIAGNOSIS — K91.2 POSTOPERATIVE MALABSORPTION: ICD-10-CM

## 2021-08-02 DIAGNOSIS — J45.40 MODERATE PERSISTENT ASTHMA WITHOUT COMPLICATION: ICD-10-CM

## 2021-08-02 PROCEDURE — 99214 OFFICE O/P EST MOD 30 MIN: CPT | Performed by: INTERNAL MEDICINE

## 2021-08-02 PROCEDURE — 3725F SCREEN DEPRESSION PERFORMED: CPT | Performed by: INTERNAL MEDICINE

## 2021-08-02 PROCEDURE — 3008F BODY MASS INDEX DOCD: CPT | Performed by: INTERNAL MEDICINE

## 2021-08-02 RX ORDER — LISDEXAMFETAMINE DIMESYLATE 60 MG/1
60 CAPSULE ORAL DAILY
COMMUNITY
Start: 2021-07-27

## 2021-08-02 NOTE — PROGRESS NOTES
Assessment/Plan: This is a 81-year-old lady with a history of migraines gastric bypass surgery and postoperative malabsorption GI bleed iron deficiency anemia pyelonephritis asthma and vitamin-D deficiency  She has been taking her iron and feels better  Denies any chest pain or shortness of breath or any palpitations  1  Iron deficiency anemia, unspecified iron deficiency anemia type  -     CBC (Includes Diff/Plt) (Refl); Future  -     Iron Saturation %; Future  -     HIV 1/2 Antigen/Antibody (4th Generation) w Reflex [facility]; Future    2  Postoperative malabsorption    3  Acute cystitis without hematuria  -     Urinalysis with microscopic    4  Need for hepatitis C screening test  -     Hepatitis C antibody; Future    5  Encounter for observation for suspected exposure to other biological agents ruled out  -     SARS CoV 2 SEROLOGY (COVID 19) AB (IGG), IA- Lab Collect; Future    6  Moderate persistent asthma without complication    7  Migraine without status migrainosus, not intractable, unspecified migraine type           1  Iron deficiency anemia, unspecified iron deficiency anemia type    - CBC (Includes Diff/Plt) (Refl); Future  - Iron Saturation %; Future    2  Postoperative malabsorption      3  Acute cystitis without hematuria    - Urinalysis with microscopic    4  Need for hepatitis C screening test    - Hepatitis C antibody; Future           Subjective:      Patient ID: Cornelius Elizondo is a 52 y o  female  This is a 81-year-old lady with a history of migraines gastric bypass surgery and postoperative malabsorption GI bleed iron deficiency anemia pyelonephritis asthma and vitamin-D deficiency  She has been taking her iron and feels better  Denies any chest pain or shortness of breath or any palpitations        The following portions of the patient's history were reviewed and updated as appropriate: She  has a past medical history of Asthma, Colon polyp, Gastroduodenitis, Intestinal malabsorption, Migraine, Pyonephrosis, UTI (urinary tract infection), and Vitamin D deficiency  She   Patient Active Problem List    Diagnosis Date Noted    Pyonephrosis 06/15/2021    Iron deficiency anemia 05/04/2021    Migraine without status migrainosus, not intractable 05/04/2021    Postoperative malabsorption 05/04/2021    Intestinal malabsorption     Gastroduodenitis     Asthma     Vitamin D deficiency      She  has a past surgical history that includes Laparoscopic gastric banding (2005); Gastric bypass (06/2008); [place] tooth extraction; Dilation and curettage of uterus (2017); Abdominal surgery; and Tubal ligation  Her family history includes Breast cancer in her family; Colon cancer in her family; Diabetes in her family; Gallbladder disease in her family; Heart disease in her family; Hypertension in her family; Kidney cancer in her family; Lung cancer in her family; Ovarian cancer in her family; Prostate cancer in her family; Stomach cancer in her family; Thyroid disease in her family and mother  She  reports that she has been smoking  She has never used smokeless tobacco  She reports current alcohol use  She reports that she does not use drugs    Current Outpatient Medications   Medication Sig Dispense Refill    cloNIDine (CATAPRES) 0 1 mg tablet Take 0 1 mg by mouth every 12 (twelve) hours       ergocalciferol (VITAMIN D2) 50,000 units 2 CAPSULES ONCE A WEEK 8 capsule 3    ferrous sulfate 324 (65 Fe) mg Take 1 tablet (324 mg total) by mouth 2 (two) times a day before meals 60 tablet 0    Multiple Vitamin (Daily-Isra) TABS TAKE 1 TABLET BY MOUTH EVERY DAY 90 tablet 3    vitamin B-12 (VITAMIN B-12) 1,000 mcg tablet Take by mouth daily      Vyvanse 60 MG capsule Take 60 mg by mouth daily      acetaminophen (TYLENOL) 325 mg tablet Take 2 tablets (650 mg total) by mouth every 6 (six) hours as needed for mild pain (Patient not taking: Reported on 8/2/2021) 30 tablet 0    albuterol (PROVENTIL HFA,VENTOLIN HFA) 90 mcg/act inhaler Inhale 2 puffs every 6 (six) hours as needed (Patient not taking: Reported on 8/2/2021)      butalbital-acetaminophen-caffeine (FIORICET,ESGIC) -40 mg per tablet Take 1 tablet by mouth every 4 (four) hours as needed for headaches (Patient not taking: Reported on 6/18/2021)      CALCIUM CITRATE PO Take by mouth (Patient not taking: Reported on 8/2/2021)       No current facility-administered medications for this visit  Current Outpatient Medications on File Prior to Visit   Medication Sig    cloNIDine (CATAPRES) 0 1 mg tablet Take 0 1 mg by mouth every 12 (twelve) hours     ergocalciferol (VITAMIN D2) 50,000 units 2 CAPSULES ONCE A WEEK    ferrous sulfate 324 (65 Fe) mg Take 1 tablet (324 mg total) by mouth 2 (two) times a day before meals    Multiple Vitamin (Daily-Isra) TABS TAKE 1 TABLET BY MOUTH EVERY DAY    vitamin B-12 (VITAMIN B-12) 1,000 mcg tablet Take by mouth daily    Vyvanse 60 MG capsule Take 60 mg by mouth daily    acetaminophen (TYLENOL) 325 mg tablet Take 2 tablets (650 mg total) by mouth every 6 (six) hours as needed for mild pain (Patient not taking: Reported on 8/2/2021)    albuterol (PROVENTIL HFA,VENTOLIN HFA) 90 mcg/act inhaler Inhale 2 puffs every 6 (six) hours as needed (Patient not taking: Reported on 8/2/2021)    butalbital-acetaminophen-caffeine (FIORICET,ESGIC) -40 mg per tablet Take 1 tablet by mouth every 4 (four) hours as needed for headaches (Patient not taking: Reported on 6/18/2021)    CALCIUM CITRATE PO Take by mouth (Patient not taking: Reported on 8/2/2021)     No current facility-administered medications on file prior to visit  She has No Known Allergies       Review of Systems   Constitutional: Negative for appetite change, chills, fatigue and fever  HENT: Negative for sore throat and trouble swallowing  Eyes: Negative for redness  Respiratory: Negative for shortness of breath      Cardiovascular: Negative for chest pain and palpitations  Gastrointestinal: Negative for abdominal pain, constipation and diarrhea  Genitourinary: Negative for dysuria and hematuria  Musculoskeletal: Negative for back pain and neck pain  Skin: Negative for rash  Neurological: Negative for seizures, weakness and headaches  Hematological: Negative for adenopathy  Psychiatric/Behavioral: Negative for confusion  The patient is not nervous/anxious            Objective:      /78   Pulse 74   Temp 98 3 °F (36 8 °C)   Ht 5' 5" (1 651 m)   Wt 86 6 kg (191 lb)   SpO2 98%   BMI 31 78 kg/m²     Recent Results (from the past 1344 hour(s))   ECG 12 lead    Collection Time: 06/14/21  8:44 PM   Result Value Ref Range    Ventricular Rate 109 BPM    Atrial Rate 109 BPM    LA Interval 124 ms    QRSD Interval 82 ms    QT Interval 308 ms    QTC Interval 414 ms    P Axis 62 degrees    QRS Axis 61 degrees    T Wave Axis 36 degrees   CBC and differential    Collection Time: 06/14/21  8:48 PM   Result Value Ref Range    WBC 18 37 (H) 4 31 - 10 16 Thousand/uL    RBC 3 96 3 81 - 5 12 Million/uL    Hemoglobin 9 4 (L) 11 5 - 15 4 g/dL    Hematocrit 30 3 (L) 34 8 - 46 1 %    MCV 77 (L) 82 - 98 fL    MCH 23 7 (L) 26 8 - 34 3 pg    MCHC 31 0 (L) 31 4 - 37 4 g/dL    RDW 15 9 (H) 11 6 - 15 1 %    MPV 11 4 8 9 - 12 7 fL    Platelets 013 514 - 350 Thousands/uL    nRBC 0 /100 WBCs    Neutrophils Relative 88 (H) 43 - 75 %    Immat GRANS % 1 0 - 2 %    Lymphocytes Relative 6 (L) 14 - 44 %    Monocytes Relative 5 4 - 12 %    Eosinophils Relative 0 0 - 6 %    Basophils Relative 0 0 - 1 %    Neutrophils Absolute 16 19 (H) 1 85 - 7 62 Thousands/µL    Immature Grans Absolute 0 13 0 00 - 0 20 Thousand/uL    Lymphocytes Absolute 1 05 0 60 - 4 47 Thousands/µL    Monocytes Absolute 0 97 0 17 - 1 22 Thousand/µL    Eosinophils Absolute 0 00 0 00 - 0 61 Thousand/µL    Basophils Absolute 0 03 0 00 - 0 10 Thousands/µL   Comprehensive metabolic panel    Collection Time: 06/14/21  8:48 PM   Result Value Ref Range    Sodium 132 (L) 136 - 145 mmol/L    Potassium 3 6 3 5 - 5 3 mmol/L    Chloride 104 100 - 108 mmol/L    CO2 21 21 - 32 mmol/L    ANION GAP 7 4 - 13 mmol/L    BUN 7 5 - 25 mg/dL    Creatinine 0 70 0 60 - 1 30 mg/dL    Glucose 129 65 - 140 mg/dL    Calcium 8 9 8 3 - 10 1 mg/dL    Corrected Calcium 9 4 8 3 - 10 1 mg/dL    AST 11 5 - 45 U/L    ALT 14 12 - 78 U/L    Alkaline Phosphatase 87 46 - 116 U/L    Total Protein 7 5 6 4 - 8 2 g/dL    Albumin 3 4 (L) 3 5 - 5 0 g/dL    Total Bilirubin 0 52 0 20 - 1 00 mg/dL    eGFR 103 ml/min/1 73sq m   Troponin I    Collection Time: 06/14/21  8:48 PM   Result Value Ref Range    Troponin I <0 02 <=0 04 ng/mL   hCG, qualitative pregnancy    Collection Time: 06/14/21  8:48 PM   Result Value Ref Range    Preg, Serum Negative Negative   Blood culture #1    Collection Time: 06/14/21 10:10 PM    Specimen: Arm, Right; Blood   Result Value Ref Range    Blood Culture No Growth After 5 Days  Blood culture #2    Collection Time: 06/14/21 10:15 PM    Specimen: Arm, Left; Blood   Result Value Ref Range    Blood Culture No Growth After 5 Days      Procalcitonin with AM Reflex    Collection Time: 06/14/21 10:17 PM   Result Value Ref Range    Procalcitonin <0 05 <=0 25 ng/ml   Lactic acid, plasma    Collection Time: 06/14/21 10:17 PM   Result Value Ref Range    LACTIC ACID 1 4 0 5 - 2 0 mmol/L   Urine Macroscopic, POC    Collection Time: 06/15/21 12:54 AM   Result Value Ref Range    Color, UA Yellow     Clarity, UA Clear     pH, UA 7 0 4 5 - 8 0    Leukocytes, UA Trace (A) Negative    Nitrite, UA Negative Negative    Protein, UA Negative Negative mg/dl    Glucose, UA Negative Negative mg/dl    Ketones, UA 15 (1+) (A) Negative mg/dl    Urobilinogen, UA 1 0 0 2, 1 0 E U /dl E U /dl    Bilirubin, UA Negative Negative    Blood, UA Moderate (A) Negative    Specific Gravity, UA 1 015 1 003 - 1 030   Urine Microscopic    Collection Time: 06/15/21 12:54 AM   Result Value Ref Range    RBC, UA 10-20 (A) None Seen, 2-4 /hpf    WBC, UA 20-30 (A) None Seen, 2-4 /hpf    Epithelial Cells None Seen None Seen, Occasional /hpf    Bacteria, UA Innumerable (A) None Seen, Occasional /hpf    Hyaline Casts, UA None Seen None Seen /lpf   Urine culture    Collection Time: 06/15/21 12:54 AM    Specimen: Urine, Clean Catch   Result Value Ref Range    Urine Culture >100,000 cfu/ml Escherichia coli (A)        Susceptibility    Escherichia coli - KAREN     ZID Performed Yes       Ampicillin ($$) <=8 00 Susceptible ug/ml     Aztreonam ($$$)  <=4 Susceptible ug/ml     Cefazolin ($) <=2 00 Susceptible ug/ml     Ciprofloxacin ($)  <=1 00 Susceptible ug/ml     Gentamicin ($$) <=1 Susceptible ug/ml     Levofloxacin ($) <=0 25 Susceptible ug/ml     Nitrofurantoin <=32 Susceptible ug/ml     Tetracycline <=4 Susceptible ug/ml     Tobramycin ($) <=1 Susceptible ug/ml     Trimethoprim + Sulfamethoxazole ($$$) <=2/38 Susceptible ug/ml   POCT urinalysis dipstick    Collection Time: 06/15/21 12:58 AM   Result Value Ref Range    Color, UA yellow     Clarity, UA cloudy    Basic metabolic panel    Collection Time: 06/15/21  5:22 AM   Result Value Ref Range    Sodium 137 136 - 145 mmol/L    Potassium 3 5 3 5 - 5 3 mmol/L    Chloride 106 100 - 108 mmol/L    CO2 25 21 - 32 mmol/L    ANION GAP 6 4 - 13 mmol/L    BUN 7 5 - 25 mg/dL    Creatinine 0 62 0 60 - 1 30 mg/dL    Glucose 114 65 - 140 mg/dL    Calcium 7 9 (L) 8 3 - 10 1 mg/dL    eGFR 107 ml/min/1 73sq m   CBC and differential    Collection Time: 06/15/21  5:22 AM   Result Value Ref Range    WBC 16 32 (H) 4 31 - 10 16 Thousand/uL    RBC 3 43 (L) 3 81 - 5 12 Million/uL    Hemoglobin 8 0 (L) 11 5 - 15 4 g/dL    Hematocrit 26 7 (L) 34 8 - 46 1 %    MCV 78 (L) 82 - 98 fL    MCH 23 3 (L) 26 8 - 34 3 pg    MCHC 30 0 (L) 31 4 - 37 4 g/dL    RDW 16 1 (H) 11 6 - 15 1 %    MPV 11 5 8 9 - 12 7 fL    Platelets 200 103 - 976 Thousands/uL    nRBC 0 /100 WBCs Neutrophils Relative 85 (H) 43 - 75 %    Immat GRANS % 1 0 - 2 %    Lymphocytes Relative 8 (L) 14 - 44 %    Monocytes Relative 6 4 - 12 %    Eosinophils Relative 0 0 - 6 %    Basophils Relative 0 0 - 1 %    Neutrophils Absolute 14 01 (H) 1 85 - 7 62 Thousands/µL    Immature Grans Absolute 0 08 0 00 - 0 20 Thousand/uL    Lymphocytes Absolute 1 25 0 60 - 4 47 Thousands/µL    Monocytes Absolute 0 94 0 17 - 1 22 Thousand/µL    Eosinophils Absolute 0 01 0 00 - 0 61 Thousand/µL    Basophils Absolute 0 03 0 00 - 0 10 Thousands/µL   Procalcitonin Reflex    Collection Time: 06/15/21  5:24 AM   Result Value Ref Range    Procalcitonin 0 06 <=0 25 ng/ml   CBC and differential    Collection Time: 06/16/21  4:54 AM   Result Value Ref Range    WBC 10 88 (H) 4 31 - 10 16 Thousand/uL    RBC 3 19 (L) 3 81 - 5 12 Million/uL    Hemoglobin 7 5 (L) 11 5 - 15 4 g/dL    Hematocrit 24 8 (L) 34 8 - 46 1 %    MCV 78 (L) 82 - 98 fL    MCH 23 5 (L) 26 8 - 34 3 pg    MCHC 30 2 (L) 31 4 - 37 4 g/dL    RDW 16 3 (H) 11 6 - 15 1 %    MPV 11 5 8 9 - 12 7 fL    Platelets 265 887 - 930 Thousands/uL    nRBC 0 /100 WBCs    Neutrophils Relative 80 (H) 43 - 75 %    Immat GRANS % 1 0 - 2 %    Lymphocytes Relative 13 (L) 14 - 44 %    Monocytes Relative 6 4 - 12 %    Eosinophils Relative 0 0 - 6 %    Basophils Relative 0 0 - 1 %    Neutrophils Absolute 8 62 (H) 1 85 - 7 62 Thousands/µL    Immature Grans Absolute 0 06 0 00 - 0 20 Thousand/uL    Lymphocytes Absolute 1 45 0 60 - 4 47 Thousands/µL    Monocytes Absolute 0 68 0 17 - 1 22 Thousand/µL    Eosinophils Absolute 0 03 0 00 - 0 61 Thousand/µL    Basophils Absolute 0 04 0 00 - 0 10 Thousands/µL   Basic metabolic panel    Collection Time: 06/16/21  4:54 AM   Result Value Ref Range    Sodium 139 136 - 145 mmol/L    Potassium 3 6 3 5 - 5 3 mmol/L    Chloride 110 (H) 100 - 108 mmol/L    CO2 25 21 - 32 mmol/L    ANION GAP 4 4 - 13 mmol/L    BUN 6 5 - 25 mg/dL    Creatinine 0 47 (L) 0 60 - 1 30 mg/dL    Glucose 92 65 - 140 mg/dL    Calcium 7 7 (L) 8 3 - 10 1 mg/dL    eGFR 117 ml/min/1 73sq m   CBC and differential    Collection Time: 06/17/21  4:51 AM   Result Value Ref Range    WBC 5 72 4 31 - 10 16 Thousand/uL    RBC 3 12 (L) 3 81 - 5 12 Million/uL    Hemoglobin 7 4 (L) 11 5 - 15 4 g/dL    Hematocrit 24 3 (L) 34 8 - 46 1 %    MCV 78 (L) 82 - 98 fL    MCH 23 7 (L) 26 8 - 34 3 pg    MCHC 30 5 (L) 31 4 - 37 4 g/dL    RDW 16 6 (H) 11 6 - 15 1 %    MPV 11 9 8 9 - 12 7 fL    Platelets 154 872 - 023 Thousands/uL    nRBC 0 /100 WBCs    Neutrophils Relative 60 43 - 75 %    Immat GRANS % 1 0 - 2 %    Lymphocytes Relative 26 14 - 44 %    Monocytes Relative 10 4 - 12 %    Eosinophils Relative 2 0 - 6 %    Basophils Relative 1 0 - 1 %    Neutrophils Absolute 3 53 1 85 - 7 62 Thousands/µL    Immature Grans Absolute 0 03 0 00 - 0 20 Thousand/uL    Lymphocytes Absolute 1 47 0 60 - 4 47 Thousands/µL    Monocytes Absolute 0 57 0 17 - 1 22 Thousand/µL    Eosinophils Absolute 0 09 0 00 - 0 61 Thousand/µL    Basophils Absolute 0 03 0 00 - 0 10 Thousands/µL        Physical Exam  Constitutional:       General: She is not in acute distress  Appearance: Normal appearance  She is obese  HENT:      Head: Normocephalic and atraumatic  Nose: Nose normal       Mouth/Throat:      Mouth: Mucous membranes are moist    Eyes:      Extraocular Movements: Extraocular movements intact  Pupils: Pupils are equal, round, and reactive to light  Cardiovascular:      Rate and Rhythm: Normal rate and regular rhythm  Pulses: Normal pulses  Heart sounds: Normal heart sounds  No murmur heard  No friction rub  Pulmonary:      Effort: Pulmonary effort is normal  No respiratory distress  Breath sounds: Normal breath sounds  No wheezing  Abdominal:      General: Abdomen is flat  Bowel sounds are normal  There is no distension  Palpations: Abdomen is soft  There is no mass  Tenderness: There is no abdominal tenderness   There is no guarding  Musculoskeletal:         General: Normal range of motion  Neurological:      General: No focal deficit present  Mental Status: She is alert and oriented to person, place, and time  Mental status is at baseline  Cranial Nerves: No cranial nerve deficit  Psychiatric:         Mood and Affect: Mood normal          Behavior: Behavior normal        BMI Counseling: Body mass index is 31 78 kg/m²  The BMI is above normal  Nutrition recommendations include reducing portion sizes, decreasing overall calorie intake and 3-5 servings of fruits/vegetables daily

## 2021-09-28 LAB
BASOPHILS # BLD AUTO: 59 CELLS/UL (ref 0–200)
BASOPHILS NFR BLD AUTO: 1.1 %
EOSINOPHIL # BLD AUTO: 81 CELLS/UL (ref 15–500)
EOSINOPHIL NFR BLD AUTO: 1.5 %
ERYTHROCYTE [DISTWIDTH] IN BLOOD BY AUTOMATED COUNT: 12.8 % (ref 11–15)
HCT VFR BLD AUTO: 38.2 % (ref 35–45)
HCV AB S/CO SERPL IA: 0.04
HCV AB SERPL QL IA: NORMAL
HGB BLD-MCNC: 12.4 G/DL (ref 11.7–15.5)
HIV 1+2 AB+HIV1 P24 AG SERPL QL IA: NORMAL
IRON SATN MFR SERPL: 33 % (CALC) (ref 16–45)
IRON SERPL-MCNC: 116 MCG/DL (ref 40–190)
LYMPHOCYTES # BLD AUTO: 1571 CELLS/UL (ref 850–3900)
LYMPHOCYTES NFR BLD AUTO: 29.1 %
MCH RBC QN AUTO: 29.9 PG (ref 27–33)
MCHC RBC AUTO-ENTMCNC: 32.5 G/DL (ref 32–36)
MCV RBC AUTO: 92 FL (ref 80–100)
MONOCYTES # BLD AUTO: 410 CELLS/UL (ref 200–950)
MONOCYTES NFR BLD AUTO: 7.6 %
NEUTROPHILS # BLD AUTO: 3278 CELLS/UL (ref 1500–7800)
NEUTROPHILS NFR BLD AUTO: 60.7 %
PLATELET # BLD AUTO: 254 THOUSAND/UL (ref 140–400)
PMV BLD REES-ECKER: 11.8 FL (ref 7.5–12.5)
RBC # BLD AUTO: 4.15 MILLION/UL (ref 3.8–5.1)
SARS-COV-2 IGG SERPL QL IA: NEGATIVE
SARS-COV-2 IGM SERPL QL IA: NEGATIVE
TIBC SERPL-MCNC: 353 MCG/DL (CALC) (ref 250–450)
WBC # BLD AUTO: 5.4 THOUSAND/UL (ref 3.8–10.8)

## 2021-10-06 ENCOUNTER — TELEPHONE (OUTPATIENT)
Dept: INTERNAL MEDICINE CLINIC | Facility: CLINIC | Age: 51
End: 2021-10-06

## 2021-10-25 ENCOUNTER — APPOINTMENT (EMERGENCY)
Dept: CT IMAGING | Facility: HOSPITAL | Age: 51
End: 2021-10-25
Payer: COMMERCIAL

## 2021-10-25 ENCOUNTER — HOSPITAL ENCOUNTER (EMERGENCY)
Facility: HOSPITAL | Age: 51
Discharge: HOME | End: 2021-10-26
Attending: EMERGENCY MEDICINE | Admitting: EMERGENCY MEDICINE
Payer: COMMERCIAL

## 2021-10-25 ENCOUNTER — APPOINTMENT (EMERGENCY)
Dept: RADIOLOGY | Facility: HOSPITAL | Age: 51
End: 2021-10-25
Payer: COMMERCIAL

## 2021-10-25 DIAGNOSIS — R51.9 HEADACHE: ICD-10-CM

## 2021-10-25 DIAGNOSIS — N39.0 UTI (URINARY TRACT INFECTION): Primary | ICD-10-CM

## 2021-10-25 DIAGNOSIS — S16.1XXA CERVICAL STRAIN, ACUTE: ICD-10-CM

## 2021-10-25 DIAGNOSIS — M54.9 BACK PAIN: ICD-10-CM

## 2021-10-25 LAB
ALBUMIN SERPL BCP-MCNC: 3.7 G/DL (ref 3.4–4.8)
ALP SERPL-CCNC: 58 U/L (ref 35–140)
ALT SERPL W P-5'-P-CCNC: 13 U/L (ref 5–54)
AMPHETAMINES SERPL QL SCN: POSITIVE
ANION GAP SERPL CALCULATED.3IONS-SCNC: 6 MMOL/L (ref 4–13)
APTT PPP: 25 SECONDS (ref 23–37)
AST SERPL W P-5'-P-CCNC: 16 U/L (ref 15–41)
BACTERIA UR QL AUTO: ABNORMAL /HPF
BARBITURATES UR QL: POSITIVE
BENZODIAZ UR QL: NEGATIVE
BILIRUB SERPL-MCNC: 0.37 MG/DL (ref 0.3–1.2)
BILIRUB UR QL STRIP: NEGATIVE
BUN SERPL-MCNC: 9 MG/DL (ref 6–20)
CALCIUM SERPL-MCNC: 8.2 MG/DL (ref 8.4–10.2)
CHLORIDE SERPL-SCNC: 105 MMOL/L (ref 96–108)
CLARITY UR: CLEAR
CO2 SERPL-SCNC: 24 MMOL/L (ref 22–33)
COCAINE UR QL: NEGATIVE
COLOR UR: YELLOW
CREAT SERPL-MCNC: 0.59 MG/DL (ref 0.4–1.1)
GFR SERPL CREATININE-BSD FRML MDRD: 108 ML/MIN/1.73SQ M
GLUCOSE SERPL-MCNC: 113 MG/DL (ref 65–140)
GLUCOSE UR STRIP-MCNC: NEGATIVE MG/DL
HGB UR QL STRIP.AUTO: ABNORMAL
INR PPP: 0.99 (ref 0.84–1.19)
KETONES UR STRIP-MCNC: NEGATIVE MG/DL
LEUKOCYTE ESTERASE UR QL STRIP: NEGATIVE
METHADONE UR QL: NEGATIVE
NITRITE UR QL STRIP: NEGATIVE
NON-SQ EPI CELLS URNS QL MICRO: ABNORMAL /HPF
OPIATES UR QL SCN: NEGATIVE
OXYCODONE+OXYMORPHONE UR QL SCN: NEGATIVE
PCP UR QL: NEGATIVE
PH UR STRIP.AUTO: 6 [PH]
POTASSIUM SERPL-SCNC: 3.5 MMOL/L (ref 3.5–5)
PROT SERPL-MCNC: 6.3 G/DL (ref 6.4–8.3)
PROT UR STRIP-MCNC: NEGATIVE MG/DL
PROTHROMBIN TIME: 13 SECONDS (ref 11.6–14.5)
RBC #/AREA URNS AUTO: ABNORMAL /HPF
SODIUM SERPL-SCNC: 135 MMOL/L (ref 133–145)
SP GR UR STRIP.AUTO: 1.02 (ref 1–1.03)
THC UR QL: NEGATIVE
TROPONIN I SERPL-MCNC: <0.03 NG/ML (ref 0–0.07)
UROBILINOGEN UR QL STRIP.AUTO: 1 E.U./DL
WBC #/AREA URNS AUTO: ABNORMAL /HPF

## 2021-10-25 PROCEDURE — 70498 CT ANGIOGRAPHY NECK: CPT

## 2021-10-25 PROCEDURE — 81001 URINALYSIS AUTO W/SCOPE: CPT | Performed by: EMERGENCY MEDICINE

## 2021-10-25 PROCEDURE — 0241U HB NFCT DS VIR RESP RNA 4 TRGT: CPT | Performed by: EMERGENCY MEDICINE

## 2021-10-25 PROCEDURE — 85610 PROTHROMBIN TIME: CPT | Performed by: EMERGENCY MEDICINE

## 2021-10-25 PROCEDURE — 96361 HYDRATE IV INFUSION ADD-ON: CPT

## 2021-10-25 PROCEDURE — 96375 TX/PRO/DX INJ NEW DRUG ADDON: CPT

## 2021-10-25 PROCEDURE — 87086 URINE CULTURE/COLONY COUNT: CPT | Performed by: EMERGENCY MEDICINE

## 2021-10-25 PROCEDURE — 81003 URINALYSIS AUTO W/O SCOPE: CPT | Performed by: EMERGENCY MEDICINE

## 2021-10-25 PROCEDURE — 80307 DRUG TEST PRSMV CHEM ANLYZR: CPT | Performed by: EMERGENCY MEDICINE

## 2021-10-25 PROCEDURE — 71045 X-RAY EXAM CHEST 1 VIEW: CPT

## 2021-10-25 PROCEDURE — 80053 COMPREHEN METABOLIC PANEL: CPT | Performed by: EMERGENCY MEDICINE

## 2021-10-25 PROCEDURE — 99284 EMERGENCY DEPT VISIT MOD MDM: CPT

## 2021-10-25 PROCEDURE — 87077 CULTURE AEROBIC IDENTIFY: CPT | Performed by: EMERGENCY MEDICINE

## 2021-10-25 PROCEDURE — 93005 ELECTROCARDIOGRAM TRACING: CPT

## 2021-10-25 PROCEDURE — 70496 CT ANGIOGRAPHY HEAD: CPT

## 2021-10-25 PROCEDURE — 84484 ASSAY OF TROPONIN QUANT: CPT | Performed by: EMERGENCY MEDICINE

## 2021-10-25 PROCEDURE — 87186 SC STD MICRODIL/AGAR DIL: CPT | Performed by: EMERGENCY MEDICINE

## 2021-10-25 PROCEDURE — 36415 COLL VENOUS BLD VENIPUNCTURE: CPT | Performed by: EMERGENCY MEDICINE

## 2021-10-25 PROCEDURE — 85730 THROMBOPLASTIN TIME PARTIAL: CPT | Performed by: EMERGENCY MEDICINE

## 2021-10-25 RX ORDER — SODIUM CHLORIDE 9 MG/ML
125 INJECTION, SOLUTION INTRAVENOUS CONTINUOUS
Status: DISCONTINUED | OUTPATIENT
Start: 2021-10-25 | End: 2021-10-26 | Stop reason: HOSPADM

## 2021-10-25 RX ORDER — DEXAMETHASONE SODIUM PHOSPHATE 10 MG/ML
10 INJECTION, SOLUTION INTRAMUSCULAR; INTRAVENOUS ONCE
Status: COMPLETED | OUTPATIENT
Start: 2021-10-25 | End: 2021-10-25

## 2021-10-25 RX ORDER — MORPHINE SULFATE 4 MG/ML
4 INJECTION, SOLUTION INTRAMUSCULAR; INTRAVENOUS ONCE
Status: COMPLETED | OUTPATIENT
Start: 2021-10-25 | End: 2021-10-25

## 2021-10-25 RX ORDER — DIPHENHYDRAMINE HYDROCHLORIDE 50 MG/ML
25 INJECTION INTRAMUSCULAR; INTRAVENOUS ONCE
Status: COMPLETED | OUTPATIENT
Start: 2021-10-25 | End: 2021-10-25

## 2021-10-25 RX ORDER — METOCLOPRAMIDE HYDROCHLORIDE 5 MG/ML
10 INJECTION INTRAMUSCULAR; INTRAVENOUS ONCE
Status: COMPLETED | OUTPATIENT
Start: 2021-10-25 | End: 2021-10-25

## 2021-10-25 RX ADMIN — METOCLOPRAMIDE 10 MG: 5 INJECTION, SOLUTION INTRAMUSCULAR; INTRAVENOUS at 22:01

## 2021-10-25 RX ADMIN — DIPHENHYDRAMINE HYDROCHLORIDE 25 MG: 50 INJECTION, SOLUTION INTRAMUSCULAR; INTRAVENOUS at 22:01

## 2021-10-25 RX ADMIN — MORPHINE SULFATE 4 MG: 4 INJECTION INTRAVENOUS at 22:01

## 2021-10-25 RX ADMIN — SODIUM CHLORIDE 125 ML/HR: 0.9 INJECTION, SOLUTION INTRAVENOUS at 22:02

## 2021-10-25 RX ADMIN — DEXAMETHASONE SODIUM PHOSPHATE 10 MG: 10 INJECTION, SOLUTION INTRAMUSCULAR; INTRAVENOUS at 22:02

## 2021-10-25 RX ADMIN — IOHEXOL 100 ML: 350 INJECTION, SOLUTION INTRAVENOUS at 23:14

## 2021-10-26 VITALS
SYSTOLIC BLOOD PRESSURE: 108 MMHG | TEMPERATURE: 97.7 F | DIASTOLIC BLOOD PRESSURE: 56 MMHG | RESPIRATION RATE: 16 BRPM | HEART RATE: 63 BPM | OXYGEN SATURATION: 98 %

## 2021-10-26 PROBLEM — S16.1XXA CERVICAL STRAIN, ACUTE: Status: ACTIVE | Noted: 2021-10-26

## 2021-10-26 PROBLEM — M54.9 BACK PAIN: Status: ACTIVE | Noted: 2021-10-26

## 2021-10-26 PROBLEM — R51.9 HEADACHE: Status: ACTIVE | Noted: 2021-10-26

## 2021-10-26 PROBLEM — N39.0 UTI (URINARY TRACT INFECTION): Status: ACTIVE | Noted: 2021-10-26

## 2021-10-26 LAB
ATRIAL RATE: 66 BPM
FLUAV RNA RESP QL NAA+PROBE: NEGATIVE
FLUBV RNA RESP QL NAA+PROBE: NEGATIVE
P AXIS: 41 DEGREES
PR INTERVAL: 138 MS
QRS AXIS: 5 DEGREES
QRSD INTERVAL: 96 MS
QT INTERVAL: 403 MS
QTC INTERVAL: 419 MS
RSV RNA RESP QL NAA+PROBE: NEGATIVE
SARS-COV-2 RNA RESP QL NAA+PROBE: NEGATIVE
T WAVE AXIS: 24 DEGREES
VENTRICULAR RATE: 65 BPM

## 2021-10-26 PROCEDURE — 96365 THER/PROPH/DIAG IV INF INIT: CPT

## 2021-10-26 PROCEDURE — 93010 ELECTROCARDIOGRAM REPORT: CPT | Performed by: INTERNAL MEDICINE

## 2021-10-26 PROCEDURE — 99285 EMERGENCY DEPT VISIT HI MDM: CPT | Performed by: EMERGENCY MEDICINE

## 2021-10-26 PROCEDURE — 96361 HYDRATE IV INFUSION ADD-ON: CPT

## 2021-10-26 RX ORDER — CEFTRIAXONE 1 G/50ML
1000 INJECTION, SOLUTION INTRAVENOUS ONCE
Status: COMPLETED | OUTPATIENT
Start: 2021-10-26 | End: 2021-10-26

## 2021-10-26 RX ORDER — SULFAMETHOXAZOLE AND TRIMETHOPRIM 800; 160 MG/1; MG/1
1 TABLET ORAL 2 TIMES DAILY
Qty: 10 TABLET | Refills: 0 | Status: SHIPPED | OUTPATIENT
Start: 2021-10-26 | End: 2021-10-31

## 2021-10-26 RX ADMIN — CEFTRIAXONE 1000 MG: 1 INJECTION, SOLUTION INTRAVENOUS at 02:20

## 2021-10-28 LAB — BACTERIA UR CULT: ABNORMAL

## 2021-11-16 ENCOUNTER — OFFICE VISIT (OUTPATIENT)
Dept: INTERNAL MEDICINE CLINIC | Facility: CLINIC | Age: 51
End: 2021-11-16
Payer: COMMERCIAL

## 2021-11-16 VITALS
WEIGHT: 190.4 LBS | HEIGHT: 65 IN | SYSTOLIC BLOOD PRESSURE: 122 MMHG | DIASTOLIC BLOOD PRESSURE: 88 MMHG | OXYGEN SATURATION: 99 % | TEMPERATURE: 98.7 F | BODY MASS INDEX: 31.72 KG/M2 | HEART RATE: 87 BPM

## 2021-11-16 DIAGNOSIS — N23 RENAL COLIC: ICD-10-CM

## 2021-11-16 DIAGNOSIS — N20.0 NEPHROLITHIASIS: Primary | ICD-10-CM

## 2021-11-16 DIAGNOSIS — K91.2 POSTOPERATIVE MALABSORPTION: ICD-10-CM

## 2021-11-16 DIAGNOSIS — M54.6 ACUTE MIDLINE THORACIC BACK PAIN: ICD-10-CM

## 2021-11-16 DIAGNOSIS — N30.00 ACUTE CYSTITIS WITHOUT HEMATURIA: ICD-10-CM

## 2021-11-16 DIAGNOSIS — R31.0 GROSS HEMATURIA: ICD-10-CM

## 2021-11-16 LAB
SL AMB  POCT GLUCOSE, UA: ABNORMAL
SL AMB LEUKOCYTE ESTERASE,UA: ABNORMAL
SL AMB POCT BILIRUBIN,UA: ABNORMAL
SL AMB POCT BLOOD,UA: ABNORMAL
SL AMB POCT CLARITY,UA: ABNORMAL
SL AMB POCT COLOR,UA: YELLOW
SL AMB POCT KETONES,UA: ABNORMAL
SL AMB POCT NITRITE,UA: ABNORMAL
SL AMB POCT PH,UA: 6
SL AMB POCT SPECIFIC GRAVITY,UA: 1.02
SL AMB POCT URINE PROTEIN: ABNORMAL
SL AMB POCT UROBILINOGEN: 0.2

## 2021-11-16 PROCEDURE — 3008F BODY MASS INDEX DOCD: CPT | Performed by: INTERNAL MEDICINE

## 2021-11-16 PROCEDURE — 81003 URINALYSIS AUTO W/O SCOPE: CPT | Performed by: INTERNAL MEDICINE

## 2021-11-16 PROCEDURE — 99215 OFFICE O/P EST HI 40 MIN: CPT | Performed by: INTERNAL MEDICINE

## 2021-11-16 PROCEDURE — 87086 URINE CULTURE/COLONY COUNT: CPT | Performed by: INTERNAL MEDICINE

## 2021-11-16 RX ORDER — NITROFURANTOIN 25; 75 MG/1; MG/1
100 CAPSULE ORAL 2 TIMES DAILY
Qty: 10 CAPSULE | Refills: 0 | Status: SHIPPED | OUTPATIENT
Start: 2021-11-16 | End: 2021-11-21

## 2021-11-18 LAB — BACTERIA UR CULT: NORMAL

## 2021-11-24 ENCOUNTER — TELEPHONE (OUTPATIENT)
Dept: PHYSICAL THERAPY | Facility: OTHER | Age: 51
End: 2021-11-24

## 2021-11-29 ENCOUNTER — HOSPITAL ENCOUNTER (OUTPATIENT)
Dept: RADIOLOGY | Age: 51
Discharge: HOME | End: 2021-11-29
Payer: COMMERCIAL

## 2021-11-29 DIAGNOSIS — K90.9 INTESTINAL MALABSORPTION, UNSPECIFIED TYPE: ICD-10-CM

## 2021-11-29 DIAGNOSIS — R31.0 GROSS HEMATURIA: ICD-10-CM

## 2021-11-29 DIAGNOSIS — N23 RENAL COLIC: ICD-10-CM

## 2021-11-29 DIAGNOSIS — N20.0 NEPHROLITHIASIS: ICD-10-CM

## 2021-11-29 PROCEDURE — G1004 CDSM NDSC: HCPCS

## 2021-11-29 PROCEDURE — 74176 CT ABD & PELVIS W/O CONTRAST: CPT

## 2021-11-29 RX ORDER — MULTIVITAMIN WITH FOLIC ACID 400 MCG
TABLET ORAL
Qty: 90 TABLET | Refills: 3 | Status: SHIPPED | OUTPATIENT
Start: 2021-11-29

## 2021-11-30 DIAGNOSIS — D50.9 IRON DEFICIENCY ANEMIA, UNSPECIFIED IRON DEFICIENCY ANEMIA TYPE: ICD-10-CM

## 2021-11-30 RX ORDER — FERROUS SULFATE TAB EC 324 MG (65 MG FE EQUIVALENT) 324 (65 FE) MG
324 TABLET DELAYED RESPONSE ORAL
Qty: 60 TABLET | Refills: 0 | Status: SHIPPED | OUTPATIENT
Start: 2021-11-30

## 2021-12-17 ENCOUNTER — OFFICE VISIT (OUTPATIENT)
Dept: INTERNAL MEDICINE CLINIC | Facility: CLINIC | Age: 51
End: 2021-12-17
Payer: COMMERCIAL

## 2021-12-17 VITALS
SYSTOLIC BLOOD PRESSURE: 100 MMHG | BODY MASS INDEX: 31.59 KG/M2 | HEART RATE: 78 BPM | WEIGHT: 189.6 LBS | DIASTOLIC BLOOD PRESSURE: 60 MMHG | TEMPERATURE: 98.6 F | OXYGEN SATURATION: 100 % | HEIGHT: 65 IN

## 2021-12-17 DIAGNOSIS — J45.40 MODERATE PERSISTENT ASTHMA WITHOUT COMPLICATION: ICD-10-CM

## 2021-12-17 DIAGNOSIS — D50.9 IRON DEFICIENCY ANEMIA, UNSPECIFIED IRON DEFICIENCY ANEMIA TYPE: ICD-10-CM

## 2021-12-17 DIAGNOSIS — E03.9 HYPOTHYROID OBESITY: ICD-10-CM

## 2021-12-17 DIAGNOSIS — K90.9 INTESTINAL MALABSORPTION, UNSPECIFIED TYPE: Primary | ICD-10-CM

## 2021-12-17 PROCEDURE — 99214 OFFICE O/P EST MOD 30 MIN: CPT | Performed by: INTERNAL MEDICINE

## 2021-12-17 PROCEDURE — 3725F SCREEN DEPRESSION PERFORMED: CPT | Performed by: INTERNAL MEDICINE

## 2021-12-17 PROCEDURE — 3008F BODY MASS INDEX DOCD: CPT | Performed by: INTERNAL MEDICINE

## 2021-12-22 DIAGNOSIS — N13.6 PYONEPHROSIS: ICD-10-CM

## 2021-12-22 RX ORDER — ERGOCALCIFEROL 1.25 MG/1
CAPSULE ORAL
Qty: 8 CAPSULE | Refills: 3 | Status: SHIPPED | OUTPATIENT
Start: 2021-12-22

## 2021-12-23 DIAGNOSIS — N13.6 PYONEPHROSIS: ICD-10-CM

## 2021-12-23 RX ORDER — ERGOCALCIFEROL 1.25 MG/1
CAPSULE ORAL
Qty: 8 CAPSULE | Refills: 3 | Status: CANCELLED | OUTPATIENT
Start: 2021-12-23

## 2022-01-07 ENCOUNTER — APPOINTMENT (OUTPATIENT)
Dept: LAB | Age: 52
End: 2022-01-07
Payer: COMMERCIAL

## 2022-01-07 DIAGNOSIS — E03.9 HYPOTHYROID OBESITY: ICD-10-CM

## 2022-01-07 DIAGNOSIS — K90.9 INTESTINAL MALABSORPTION, UNSPECIFIED TYPE: ICD-10-CM

## 2022-01-07 LAB
25(OH)D3 SERPL-MCNC: >135 NG/ML (ref 30–100)
ALBUMIN SERPL BCP-MCNC: 3.4 G/DL (ref 3.5–5)
ALP SERPL-CCNC: 78 U/L (ref 46–116)
ALT SERPL W P-5'-P-CCNC: 25 U/L (ref 12–78)
ANION GAP SERPL CALCULATED.3IONS-SCNC: 4 MMOL/L (ref 4–13)
AST SERPL W P-5'-P-CCNC: 14 U/L (ref 5–45)
BACTERIA UR QL AUTO: ABNORMAL /HPF
BASOPHILS # BLD AUTO: 0.04 THOUSANDS/ΜL (ref 0–0.1)
BASOPHILS NFR BLD AUTO: 1 % (ref 0–1)
BILIRUB SERPL-MCNC: 0.88 MG/DL (ref 0.2–1)
BILIRUB UR QL STRIP: NEGATIVE
BUN SERPL-MCNC: 9 MG/DL (ref 5–25)
CALCIUM ALBUM COR SERPL-MCNC: 9.2 MG/DL (ref 8.3–10.1)
CALCIUM SERPL-MCNC: 8.7 MG/DL (ref 8.3–10.1)
CHLORIDE SERPL-SCNC: 108 MMOL/L (ref 100–108)
CLARITY UR: CLEAR
CO2 SERPL-SCNC: 26 MMOL/L (ref 21–32)
COLOR UR: YELLOW
CREAT SERPL-MCNC: 0.65 MG/DL (ref 0.6–1.3)
EOSINOPHIL # BLD AUTO: 0.07 THOUSAND/ΜL (ref 0–0.61)
EOSINOPHIL NFR BLD AUTO: 1 % (ref 0–6)
ERYTHROCYTE [DISTWIDTH] IN BLOOD BY AUTOMATED COUNT: 12.3 % (ref 11.6–15.1)
GFR SERPL CREATININE-BSD FRML MDRD: 104 ML/MIN/1.73SQ M
GLUCOSE SERPL-MCNC: 97 MG/DL (ref 65–140)
GLUCOSE UR STRIP-MCNC: NEGATIVE MG/DL
HCT VFR BLD AUTO: 35.4 % (ref 34.8–46.1)
HGB BLD-MCNC: 11.5 G/DL (ref 11.5–15.4)
HGB UR QL STRIP.AUTO: ABNORMAL
HYALINE CASTS #/AREA URNS LPF: ABNORMAL /LPF
IMM GRANULOCYTES # BLD AUTO: 0.02 THOUSAND/UL (ref 0–0.2)
IMM GRANULOCYTES NFR BLD AUTO: 0 % (ref 0–2)
IRON SATN MFR SERPL: 26 % (ref 15–50)
IRON SERPL-MCNC: 75 UG/DL (ref 50–170)
KETONES UR STRIP-MCNC: ABNORMAL MG/DL
LEUKOCYTE ESTERASE UR QL STRIP: NEGATIVE
LYMPHOCYTES # BLD AUTO: 1.84 THOUSANDS/ΜL (ref 0.6–4.47)
LYMPHOCYTES NFR BLD AUTO: 26 % (ref 14–44)
MCH RBC QN AUTO: 31 PG (ref 26.8–34.3)
MCHC RBC AUTO-ENTMCNC: 32.5 G/DL (ref 31.4–37.4)
MCV RBC AUTO: 95 FL (ref 82–98)
MONOCYTES # BLD AUTO: 0.42 THOUSAND/ΜL (ref 0.17–1.22)
MONOCYTES NFR BLD AUTO: 6 % (ref 4–12)
NEUTROPHILS # BLD AUTO: 4.83 THOUSANDS/ΜL (ref 1.85–7.62)
NEUTS SEG NFR BLD AUTO: 66 % (ref 43–75)
NITRITE UR QL STRIP: NEGATIVE
NON-SQ EPI CELLS URNS QL MICRO: ABNORMAL /HPF
NRBC BLD AUTO-RTO: 0 /100 WBCS
PH UR STRIP.AUTO: 6.5 [PH]
PLATELET # BLD AUTO: 246 THOUSANDS/UL (ref 149–390)
PMV BLD AUTO: 11.7 FL (ref 8.9–12.7)
POTASSIUM SERPL-SCNC: 4.2 MMOL/L (ref 3.5–5.3)
PROT SERPL-MCNC: 6.8 G/DL (ref 6.4–8.2)
PROT UR STRIP-MCNC: NEGATIVE MG/DL
RBC # BLD AUTO: 3.71 MILLION/UL (ref 3.81–5.12)
RBC #/AREA URNS AUTO: ABNORMAL /HPF
SODIUM SERPL-SCNC: 138 MMOL/L (ref 136–145)
SP GR UR STRIP.AUTO: 1.02 (ref 1–1.03)
T4 FREE SERPL-MCNC: 0.91 NG/DL (ref 0.76–1.46)
TIBC SERPL-MCNC: 293 UG/DL (ref 250–450)
TSH SERPL DL<=0.05 MIU/L-ACNC: 1.74 UIU/ML (ref 0.36–3.74)
UROBILINOGEN UR QL STRIP.AUTO: 1 E.U./DL
VIT B12 SERPL-MCNC: 2925 PG/ML (ref 100–900)
WBC # BLD AUTO: 7.22 THOUSAND/UL (ref 4.31–10.16)
WBC #/AREA URNS AUTO: ABNORMAL /HPF

## 2022-01-07 PROCEDURE — 81001 URINALYSIS AUTO W/SCOPE: CPT | Performed by: INTERNAL MEDICINE

## 2022-01-07 PROCEDURE — 36415 COLL VENOUS BLD VENIPUNCTURE: CPT

## 2022-01-07 PROCEDURE — 83540 ASSAY OF IRON: CPT

## 2022-01-07 PROCEDURE — 83550 IRON BINDING TEST: CPT

## 2022-01-07 PROCEDURE — 80053 COMPREHEN METABOLIC PANEL: CPT

## 2022-01-07 PROCEDURE — 84439 ASSAY OF FREE THYROXINE: CPT

## 2022-01-07 PROCEDURE — 82306 VITAMIN D 25 HYDROXY: CPT

## 2022-01-07 PROCEDURE — 84590 ASSAY OF VITAMIN A: CPT

## 2022-01-07 PROCEDURE — 85025 COMPLETE CBC W/AUTO DIFF WBC: CPT

## 2022-01-07 PROCEDURE — 84443 ASSAY THYROID STIM HORMONE: CPT

## 2022-01-07 PROCEDURE — 82607 VITAMIN B-12: CPT

## 2022-01-18 LAB — VIT A SERPL-MCNC: 20.7 UG/DL (ref 20.1–62)

## 2022-01-26 ENCOUNTER — OFFICE VISIT (OUTPATIENT)
Dept: SURGERY | Facility: CLINIC | Age: 52
End: 2022-01-26
Payer: COMMERCIAL

## 2022-01-26 ENCOUNTER — TELEPHONE (OUTPATIENT)
Dept: SURGERY | Facility: CLINIC | Age: 52
End: 2022-01-26

## 2022-01-26 VITALS
DIASTOLIC BLOOD PRESSURE: 88 MMHG | WEIGHT: 189 LBS | HEIGHT: 65 IN | HEART RATE: 91 BPM | SYSTOLIC BLOOD PRESSURE: 142 MMHG | TEMPERATURE: 97.9 F | RESPIRATION RATE: 18 BRPM | BODY MASS INDEX: 31.49 KG/M2

## 2022-01-26 DIAGNOSIS — R10.11 ABDOMINAL PAIN, RUQ (RIGHT UPPER QUADRANT): Primary | ICD-10-CM

## 2022-01-26 DIAGNOSIS — Z12.11 SCREENING FOR COLON CANCER: ICD-10-CM

## 2022-01-26 PROCEDURE — 3008F BODY MASS INDEX DOCD: CPT | Performed by: SURGERY

## 2022-01-26 PROCEDURE — 99214 OFFICE O/P EST MOD 30 MIN: CPT | Performed by: SURGERY

## 2022-01-26 NOTE — PROGRESS NOTES
Assessment/Plan:  Patient had a CT scan of her abdomen 2 months ago  I have reviewed the pictures  It was normal   She may have gallbladder pathology  I am sending her for a ultrasound of the gallbladder  I am also scheduling her for colonoscopy as per her request in the month of March  Bowel prep instructions were provided  No problem-specific Assessment & Plan notes found for this encounter  Diagnoses and all orders for this visit:    Abdominal pain, RUQ (right upper quadrant)  -     US abdomen limited; Future    Screening for colon cancer    Other orders  -     FOLIC ACID PO; Take by mouth          Subjective:      Patient ID: Channing Escalera is a 54 y o  female  53-year-old female patient came here with 2 complaints  Her 1st complaint was upper abdominal pain  She says that her pain had improved after she had surgery for internal hernia many years ago however upper abdominal pain has started coming again  No nausea or vomiting  Pain is related to food intake  She also is here to schedule a screening colonoscopy  This is due to her having multiple family members were 1st and second-degree relatives who suffered from colon cancer  She had a colonoscopy last year which was incomplete thus there is a need for a complete colonoscopy  No change in weight  No change in bowel habits  The following portions of the patient's history were reviewed and updated as appropriate: allergies, current medications, past family history, past medical history, past social history, past surgical history and problem list     Review of Systems   Constitutional: Negative  HENT: Negative  Eyes: Negative  Respiratory: Negative  Cardiovascular: Negative  Gastrointestinal: Positive for abdominal pain  Endocrine: Negative  Genitourinary: Negative  Musculoskeletal: Negative  Skin: Negative  Allergic/Immunologic: Negative  Neurological: Negative  Hematological: Negative  Psychiatric/Behavioral: Negative  Objective:      /88 (BP Location: Left arm, Patient Position: Sitting, Cuff Size: Adult)   Pulse 91   Temp 97 9 °F (36 6 °C)   Resp 18   Ht 5' 5" (1 651 m)   Wt 85 7 kg (189 lb)   BMI 31 45 kg/m²          Physical Exam  Vitals reviewed  Constitutional:       Appearance: Normal appearance  HENT:      Head: Normocephalic and atraumatic  Nose: Nose normal       Mouth/Throat:      Mouth: Mucous membranes are moist    Eyes:      Pupils: Pupils are equal, round, and reactive to light  Cardiovascular:      Rate and Rhythm: Normal rate and regular rhythm  Pulmonary:      Effort: Pulmonary effort is normal       Breath sounds: Normal breath sounds  Abdominal:      General: Bowel sounds are normal       Palpations: Abdomen is soft  Tenderness: There is no abdominal tenderness  There is no guarding  Hernia: No hernia is present  Musculoskeletal:         General: Normal range of motion  Cervical back: Normal range of motion  Skin:     General: Skin is warm  Capillary Refill: Capillary refill takes less than 2 seconds  Neurological:      General: No focal deficit present  Mental Status: She is alert     Psychiatric:         Mood and Affect: Mood normal          Behavior: Behavior normal

## 2022-01-26 NOTE — TELEPHONE ENCOUNTER
Attempted to call, and left a message   When she calls back, please ask her to come in earlier today to se Dr Olman Anguiano

## 2022-01-28 ENCOUNTER — HOSPITAL ENCOUNTER (OUTPATIENT)
Dept: ULTRASOUND IMAGING | Facility: HOSPITAL | Age: 52
Discharge: HOME | End: 2022-01-28
Attending: SURGERY
Payer: COMMERCIAL

## 2022-01-28 DIAGNOSIS — R10.11 ABDOMINAL PAIN, RUQ (RIGHT UPPER QUADRANT): ICD-10-CM

## 2022-01-28 PROCEDURE — 76705 ECHO EXAM OF ABDOMEN: CPT

## 2022-03-11 ENCOUNTER — HOSPITAL ENCOUNTER (OUTPATIENT)
Dept: GASTROENTEROLOGY | Facility: HOSPITAL | Age: 52
Setting detail: OUTPATIENT SURGERY
Discharge: HOME | End: 2022-03-11
Attending: SURGERY | Admitting: SURGERY
Payer: COMMERCIAL

## 2022-03-11 ENCOUNTER — ANESTHESIA EVENT (OUTPATIENT)
Dept: GASTROENTEROLOGY | Facility: HOSPITAL | Age: 52
End: 2022-03-11

## 2022-03-11 ENCOUNTER — ANESTHESIA (OUTPATIENT)
Dept: GASTROENTEROLOGY | Facility: HOSPITAL | Age: 52
End: 2022-03-11

## 2022-03-11 VITALS
HEART RATE: 77 BPM | DIASTOLIC BLOOD PRESSURE: 59 MMHG | HEIGHT: 65 IN | RESPIRATION RATE: 14 BRPM | TEMPERATURE: 97.1 F | OXYGEN SATURATION: 100 % | SYSTOLIC BLOOD PRESSURE: 103 MMHG | WEIGHT: 183 LBS | BODY MASS INDEX: 30.49 KG/M2

## 2022-03-11 DIAGNOSIS — Z12.11 SCREENING FOR COLON CANCER: ICD-10-CM

## 2022-03-11 PROCEDURE — G0121 COLON CA SCRN NOT HI RSK IND: HCPCS | Performed by: SURGERY

## 2022-03-11 RX ORDER — LIDOCAINE HYDROCHLORIDE 10 MG/ML
INJECTION, SOLUTION EPIDURAL; INFILTRATION; INTRACAUDAL; PERINEURAL AS NEEDED
Status: DISCONTINUED | OUTPATIENT
Start: 2022-03-11 | End: 2022-03-11

## 2022-03-11 RX ORDER — SODIUM CHLORIDE, SODIUM LACTATE, POTASSIUM CHLORIDE, CALCIUM CHLORIDE 600; 310; 30; 20 MG/100ML; MG/100ML; MG/100ML; MG/100ML
INJECTION, SOLUTION INTRAVENOUS CONTINUOUS PRN
Status: DISCONTINUED | OUTPATIENT
Start: 2022-03-11 | End: 2022-03-11

## 2022-03-11 RX ORDER — PROPOFOL 10 MG/ML
INJECTION, EMULSION INTRAVENOUS AS NEEDED
Status: DISCONTINUED | OUTPATIENT
Start: 2022-03-11 | End: 2022-03-11

## 2022-03-11 RX ADMIN — PROPOFOL 100 MG: 10 INJECTION, EMULSION INTRAVENOUS at 08:58

## 2022-03-11 RX ADMIN — PROPOFOL 50 MG: 10 INJECTION, EMULSION INTRAVENOUS at 09:07

## 2022-03-11 RX ADMIN — SODIUM CHLORIDE, SODIUM LACTATE, POTASSIUM CHLORIDE, AND CALCIUM CHLORIDE: .6; .31; .03; .02 INJECTION, SOLUTION INTRAVENOUS at 08:41

## 2022-03-11 RX ADMIN — LIDOCAINE HYDROCHLORIDE 50 MG: 10 INJECTION, SOLUTION EPIDURAL; INFILTRATION; INTRACAUDAL; PERINEURAL at 08:52

## 2022-03-11 RX ADMIN — PROPOFOL 100 MG: 10 INJECTION, EMULSION INTRAVENOUS at 08:52

## 2022-03-11 NOTE — ANESTHESIA POSTPROCEDURE EVALUATION
Post-Op Assessment Note    CV Status:  Stable    Pain management: adequate     Mental Status:  Alert and awake   Hydration Status:  Euvolemic   PONV Controlled:  Controlled   Airway Patency:  Patent      Post Op Vitals Reviewed: Yes      Staff: CRNA         No complications documented      BP   115/67   Temp  97 1   Pulse  59   Resp  16   SpO2   100%

## 2022-03-11 NOTE — ANESTHESIA PREPROCEDURE EVALUATION
Procedure:  COLONOSCOPY    Relevant Problems   CARDIO   (+) Migraine without status migrainosus, not intractable      /RENAL   (+) Pyonephrosis      HEMATOLOGY   (+) Iron deficiency anemia      MUSCULOSKELETAL   (+) Back pain   (+) Cervical strain, acute      NEURO/PSYCH   (+) Headache   (+) Migraine without status migrainosus, not intractable      PULMONARY   (+) Asthma        Physical Exam    Airway    Mallampati score: II  TM Distance: >3 FB  Neck ROM: full     Dental   No notable dental hx     Cardiovascular  Cardiovascular exam normal    Pulmonary  Pulmonary exam normal     Other Findings        Anesthesia Plan  ASA Score- 2     Anesthesia Type- IV sedation with anesthesia with ASA Monitors  Additional Monitors:   Airway Plan:           Plan Factors-Exercise tolerance (METS): >4 METS  Chart reviewed  Imaging results reviewed  Existing labs reviewed  Patient summary reviewed  Patient is a current smoker  Patient smoked on day of surgery  Induction-     Postoperative Plan-     Informed Consent- Anesthetic plan and risks discussed with patient  I personally reviewed this patient with the CRNA  Discussed and agreed on the Anesthesia Plan with the CRNA  Ca Mccollum

## 2022-03-11 NOTE — H&P
Assessment/Plan:  Patient had a CT scan of her abdomen 2 months ago  I have reviewed the pictures  It was normal   She may have gallbladder pathology  I am sending her for a ultrasound of the gallbladder  I am also scheduling her for colonoscopy as per her request in the month of March  Bowel prep instructions were provided      No problem-specific Assessment & Plan notes found for this encounter          Diagnoses and all orders for this visit:     Abdominal pain, RUQ (right upper quadrant)  -     US abdomen limited; Future     Screening for colon cancer     Other orders  -     FOLIC ACID PO; Take by mouth            Subjective:       Patient ID: Marilyn Gunderson is a 54 y o  female      82-year-old female patient came here with 2 complaints  Her 1st complaint was upper abdominal pain  She says that her pain had improved after she had surgery for internal hernia many years ago however upper abdominal pain has started coming again  No nausea or vomiting  Pain is related to food intake  She also is here to schedule a screening colonoscopy  This is due to her having multiple family members were 1st and second-degree relatives who suffered from colon cancer  She had a colonoscopy last year which was incomplete thus there is a need for a complete colonoscopy  No change in weight  No change in bowel habits         The following portions of the patient's history were reviewed and updated as appropriate: allergies, current medications, past family history, past medical history, past social history, past surgical history and problem list      Review of Systems   Constitutional: Negative  HENT: Negative  Eyes: Negative  Respiratory: Negative  Cardiovascular: Negative  Gastrointestinal: Positive for abdominal pain  Endocrine: Negative  Genitourinary: Negative  Musculoskeletal: Negative  Skin: Negative  Allergic/Immunologic: Negative  Neurological: Negative  Hematological: Negative  Psychiatric/Behavioral: Negative            Objective:        /88 (BP Location: Left arm, Patient Position: Sitting, Cuff Size: Adult)   Pulse 91   Temp 97 9 °F (36 6 °C)   Resp 18   Ht 5' 5" (1 651 m)   Wt 85 7 kg (189 lb)   BMI 31 45 kg/m²             Physical Exam  Vitals reviewed  Constitutional:       Appearance: Normal appearance  HENT:      Head: Normocephalic and atraumatic  Nose: Nose normal       Mouth/Throat:      Mouth: Mucous membranes are moist    Eyes:      Pupils: Pupils are equal, round, and reactive to light  Cardiovascular:      Rate and Rhythm: Normal rate and regular rhythm  Pulmonary:      Effort: Pulmonary effort is normal       Breath sounds: Normal breath sounds  Abdominal:      General: Bowel sounds are normal       Palpations: Abdomen is soft  Tenderness: There is no abdominal tenderness  There is no guarding  Hernia: No hernia is present  Musculoskeletal:         General: Normal range of motion  Cervical back: Normal range of motion  Skin:     General: Skin is warm  Capillary Refill: Capillary refill takes less than 2 seconds  Neurological:      General: No focal deficit present  Mental Status: She is alert     Psychiatric:         Mood and Affect: Mood normal          Behavior: Behavior normal

## 2022-03-11 NOTE — DISCHARGE INSTRUCTIONS
Colonoscopy   WHAT YOU NEED TO KNOW:   A colonoscopy is a procedure to examine the inside of your colon (intestine) with a scope  Polyps or tissue growths may have been removed during your colonoscopy  It is normal to feel bloated and to have some abdominal discomfort  You should be passing gas  If you have hemorrhoids or you had polyps removed, you may have a small amount of bleeding  DISCHARGE INSTRUCTIONS:   Seek care immediately if:   · You have a large amount of bright red blood in your bowel movements  · Your abdomen is hard and firm and you have severe pain  · You have sudden trouble breathing  Contact your healthcare provider if:   · You develop a rash or hives  · You have a fever within 24 hours of your procedure       · You have not had a bowel movement for 3 days after your procedure  · You have questions or concerns about your condition or care  Activity:   · Do not lift, strain, or run  for 3 days after your procedure  · Rest after your procedure  You have been given medicine to relax you  Do not  drive or make important decisions until the day after your procedure  Return to your normal activity as directed  · Relieve gas and discomfort from bloating  by lying on your right side with a heating pad on your abdomen  You may need to take short walks to help the gas move out  Eat small meals until bloating is relieved  If you had polyps removed: For 7 days after your procedure:  · Do not  take aspirin  · Do not  go on long car rides  Follow up with your healthcare provider as directed:  Write down your questions so you remember to ask them during your visits  © 2017 [employer] is for End User's use only and may not be sold, redistributed or otherwise used for commercial purposes  All illustrations and images included in CareNotes® are the copyrighted property of [employer]  or [employer]    The above information is an  only  It is not intended as medical advice for individual conditions or treatments  Talk to your doctor, nurse or pharmacist before following any medical regimen to see if it is safe and effective for you

## 2022-03-23 ENCOUNTER — HOSPITAL ENCOUNTER (OUTPATIENT)
Dept: NUCLEAR MEDICINE | Facility: HOSPITAL | Age: 52
Discharge: HOME | End: 2022-03-23
Attending: SURGERY
Payer: COMMERCIAL

## 2022-03-23 DIAGNOSIS — R10.11 ABDOMINAL PAIN, RUQ (RIGHT UPPER QUADRANT): ICD-10-CM

## 2022-03-23 PROCEDURE — 78227 HEPATOBIL SYST IMAGE W/DRUG: CPT

## 2022-03-23 PROCEDURE — G1004 CDSM NDSC: HCPCS

## 2022-03-23 PROCEDURE — A9537 TC99M MEBROFENIN: HCPCS

## 2022-03-23 RX ADMIN — SINCALIDE 1.7 MCG: 5 INJECTION, POWDER, LYOPHILIZED, FOR SOLUTION INTRAVENOUS at 10:03

## 2022-04-12 ENCOUNTER — OFFICE VISIT (OUTPATIENT)
Dept: INTERNAL MEDICINE CLINIC | Facility: CLINIC | Age: 52
End: 2022-04-12
Payer: COMMERCIAL

## 2022-04-12 VITALS
HEART RATE: 69 BPM | SYSTOLIC BLOOD PRESSURE: 102 MMHG | OXYGEN SATURATION: 98 % | WEIGHT: 185 LBS | BODY MASS INDEX: 30.79 KG/M2 | DIASTOLIC BLOOD PRESSURE: 60 MMHG | TEMPERATURE: 98.7 F

## 2022-04-12 DIAGNOSIS — G43.109 MIGRAINE WITH AURA AND WITHOUT STATUS MIGRAINOSUS, NOT INTRACTABLE: Chronic | ICD-10-CM

## 2022-04-12 DIAGNOSIS — R30.0 DYSURIA: Primary | ICD-10-CM

## 2022-04-12 DIAGNOSIS — K91.2 POSTOPERATIVE MALABSORPTION: ICD-10-CM

## 2022-04-12 LAB
BACTERIA UR QL AUTO: ABNORMAL /HPF
BILIRUB UR QL STRIP: NEGATIVE
CLARITY UR: CLEAR
COLOR UR: COLORLESS
GLUCOSE UR STRIP-MCNC: NEGATIVE MG/DL
HGB UR QL STRIP.AUTO: ABNORMAL
KETONES UR STRIP-MCNC: NEGATIVE MG/DL
LEUKOCYTE ESTERASE UR QL STRIP: NEGATIVE
NITRITE UR QL STRIP: NEGATIVE
NON-SQ EPI CELLS URNS QL MICRO: ABNORMAL /HPF
PH UR STRIP.AUTO: 6 [PH]
PROT UR STRIP-MCNC: NEGATIVE MG/DL
RBC #/AREA URNS AUTO: ABNORMAL /HPF
SP GR UR STRIP.AUTO: 1 (ref 1–1.03)
UROBILINOGEN UR STRIP-ACNC: <2 MG/DL
WBC #/AREA URNS AUTO: ABNORMAL /HPF

## 2022-04-12 PROCEDURE — 99214 OFFICE O/P EST MOD 30 MIN: CPT | Performed by: INTERNAL MEDICINE

## 2022-04-12 PROCEDURE — 87077 CULTURE AEROBIC IDENTIFY: CPT | Performed by: INTERNAL MEDICINE

## 2022-04-12 PROCEDURE — 87186 SC STD MICRODIL/AGAR DIL: CPT | Performed by: INTERNAL MEDICINE

## 2022-04-12 PROCEDURE — 81001 URINALYSIS AUTO W/SCOPE: CPT | Performed by: INTERNAL MEDICINE

## 2022-04-12 PROCEDURE — 87086 URINE CULTURE/COLONY COUNT: CPT | Performed by: INTERNAL MEDICINE

## 2022-04-14 ENCOUNTER — TELEPHONE (OUTPATIENT)
Dept: INTERNAL MEDICINE CLINIC | Facility: CLINIC | Age: 52
End: 2022-04-14

## 2022-04-14 DIAGNOSIS — N10 ACUTE PYELONEPHRITIS: Primary | ICD-10-CM

## 2022-04-14 LAB
ALBUMIN SERPL-MCNC: 3.8 G/DL (ref 3.6–5.1)
ALBUMIN/GLOB SERPL: 1.5 (CALC) (ref 1–2.5)
ALP SERPL-CCNC: 78 U/L (ref 31–125)
ALT SERPL-CCNC: 13 U/L (ref 6–29)
AST SERPL-CCNC: 19 U/L (ref 10–35)
BACTERIA UR CULT: ABNORMAL
BASOPHILS # BLD AUTO: 41 CELLS/UL (ref 0–200)
BASOPHILS NFR BLD AUTO: 0.9 %
BILIRUB SERPL-MCNC: 0.2 MG/DL (ref 0.2–1.2)
BUN SERPL-MCNC: 11 MG/DL (ref 7–25)
BUN/CREAT SERPL: NORMAL (CALC) (ref 6–22)
CALCIUM SERPL-MCNC: 9.1 MG/DL (ref 8.6–10.2)
CHLORIDE SERPL-SCNC: 105 MMOL/L (ref 98–110)
CO2 SERPL-SCNC: 29 MMOL/L (ref 20–32)
CREAT SERPL-MCNC: 0.75 MG/DL (ref 0.5–1.1)
CRP SERPL-MCNC: 0.6 MG/L
EOSINOPHIL # BLD AUTO: 108 CELLS/UL (ref 15–500)
EOSINOPHIL NFR BLD AUTO: 2.4 %
ERYTHROCYTE [DISTWIDTH] IN BLOOD BY AUTOMATED COUNT: 11.8 % (ref 11–15)
ERYTHROCYTE [SEDIMENTATION RATE] IN BLOOD BY WESTERGREN METHOD: 11 MM/H
GLOBULIN SER CALC-MCNC: 2.6 G/DL (CALC) (ref 1.9–3.7)
GLUCOSE SERPL-MCNC: 88 MG/DL (ref 65–99)
HCT VFR BLD AUTO: 35.8 % (ref 35–45)
HGB BLD-MCNC: 12.2 G/DL (ref 11.7–15.5)
LYMPHOCYTES # BLD AUTO: 1548 CELLS/UL (ref 850–3900)
LYMPHOCYTES NFR BLD AUTO: 34.4 %
MCH RBC QN AUTO: 31 PG (ref 27–33)
MCHC RBC AUTO-ENTMCNC: 34.1 G/DL (ref 32–36)
MCV RBC AUTO: 91.1 FL (ref 80–100)
MONOCYTES # BLD AUTO: 320 CELLS/UL (ref 200–950)
MONOCYTES NFR BLD AUTO: 7.1 %
NEUTROPHILS # BLD AUTO: 2484 CELLS/UL (ref 1500–7800)
NEUTROPHILS NFR BLD AUTO: 55.2 %
PLATELET # BLD AUTO: 226 THOUSAND/UL (ref 140–400)
PMV BLD REES-ECKER: 11.9 FL (ref 7.5–12.5)
POTASSIUM SERPL-SCNC: 4.6 MMOL/L (ref 3.5–5.3)
PROT SERPL-MCNC: 6.4 G/DL (ref 6.1–8.1)
RBC # BLD AUTO: 3.93 MILLION/UL (ref 3.8–5.1)
SL AMB EGFR AFRICAN AMERICAN: 108 ML/MIN/1.73M2
SL AMB EGFR NON AFRICAN AMERICAN: 94 ML/MIN/1.73M2
SODIUM SERPL-SCNC: 138 MMOL/L (ref 135–146)
WBC # BLD AUTO: 4.5 THOUSAND/UL (ref 3.8–10.8)

## 2022-04-14 RX ORDER — CEPHALEXIN 500 MG/1
500 CAPSULE ORAL EVERY 6 HOURS SCHEDULED
Qty: 40 CAPSULE | Refills: 0 | Status: SHIPPED | OUTPATIENT
Start: 2022-04-14 | End: 2022-04-24

## 2022-04-17 NOTE — PROGRESS NOTES
Assessment/Plan:           1  Dysuria  Comments:  She took left over antibiotic last week  Was advised not to take empiric antibiotic without getting a culture because of history of severe UTIs  Orders:  -     Urinalysis with microscopic  -     Urine culture; Future  -     Urine culture; Future  -     Urinalysis with microscopic  -     Urine culture    2  Migraine with aura and without status migrainosus, not intractable  Comments:  Continue Tylenol p r n   Orders:  -     CBC and differential; Future  -     Comprehensive metabolic panel; Future  -     Sedimentation rate, automated; Future  -     C-reactive protein; Future    3  Postoperative malabsorption  Comments:  Advised to continue supplementation  1  Migraine with aura and without status migrainosus, not intractable    - CBC and differential; Future  - Comprehensive metabolic panel; Future  - Sedimentation rate, automated; Future  - C-reactive protein; Future    2  Dysuria    - Urinalysis with microscopic  - Urine culture; Future  - Urine culture; Future  - Urinalysis with microscopic  - Urine culture       No problem-specific Assessment & Plan notes found for this encounter  Subjective:      Patient ID: Sruthi Zavala is a 54 y o  female  HPI    The following portions of the patient's history were reviewed and updated as appropriate: She  has a past medical history of Asthma, Colon polyp, Gastroduodenitis, Intestinal malabsorption, Migraine, Pyonephrosis, UTI (urinary tract infection), and Vitamin D deficiency    She   Patient Active Problem List    Diagnosis Date Noted    UTI (urinary tract infection) 10/26/2021    Back pain 10/26/2021    Headache 10/26/2021    Cervical strain, acute 10/26/2021    Pyonephrosis 06/15/2021    Iron deficiency anemia 05/04/2021    Migraine without status migrainosus, not intractable 05/04/2021    Postoperative malabsorption 05/04/2021    Intestinal malabsorption     Gastroduodenitis     Asthma     Vitamin D deficiency      She  has a past surgical history that includes Laparoscopic gastric banding (2005); Gastric bypass (06/2008); [place] tooth extraction; Dilation and curettage of uterus (2017); Abdominal surgery; Tubal ligation; CT cystogram (5/23/2017); and CT cystogram (5/23/2017)  Her family history includes Breast cancer in her family; Colon cancer in her family; Diabetes in her family; Gallbladder disease in her family; Heart disease in her family; Hypertension in her family; Kidney cancer in her family; Lung cancer in her family; Ovarian cancer in her family; Prostate cancer in her family; Stomach cancer in her family; Thyroid disease in her family and mother  She  reports that she has been smoking  She has never used smokeless tobacco  She reports current alcohol use  She reports that she does not use drugs  Current Outpatient Medications   Medication Sig Dispense Refill    cloNIDine (CATAPRES) 0 1 mg tablet Take 0 1 mg by mouth every 12 (twelve) hours Taking one daily       ergocalciferol (VITAMIN D2) 50,000 units 2 CAPSULES ONCE A WEEK 8 capsule 3    ferrous sulfate 324 (65 Fe) mg TAKE 1 TABLET (324 MG TOTAL) BY MOUTH 2 (TWO) TIMES A DAY BEFORE MEALS 60 tablet 0    FOLIC ACID PO Take by mouth      Multiple Vitamin (Daily-Isra Multivitamin) TABS TAKE 1 TABLET BY MOUTH EVERY DAY 90 tablet 3    vitamin B-12 (VITAMIN B-12) 1,000 mcg tablet Take by mouth daily      Vyvanse 60 MG capsule Take 60 mg by mouth daily      CALCIUM CITRATE PO Take by mouth (Patient not taking: Reported on 8/2/2021)      cephalexin (KEFLEX) 500 mg capsule Take 1 capsule (500 mg total) by mouth every 6 (six) hours for 10 days 40 capsule 0     No current facility-administered medications for this visit       Current Outpatient Medications on File Prior to Visit   Medication Sig    cloNIDine (CATAPRES) 0 1 mg tablet Take 0 1 mg by mouth every 12 (twelve) hours Taking one daily     ergocalciferol (VITAMIN D2) 50,000 units 2 CAPSULES ONCE A WEEK    ferrous sulfate 324 (65 Fe) mg TAKE 1 TABLET (324 MG TOTAL) BY MOUTH 2 (TWO) TIMES A DAY BEFORE MEALS    FOLIC ACID PO Take by mouth    Multiple Vitamin (Daily-Isra Multivitamin) TABS TAKE 1 TABLET BY MOUTH EVERY DAY    vitamin B-12 (VITAMIN B-12) 1,000 mcg tablet Take by mouth daily    Vyvanse 60 MG capsule Take 60 mg by mouth daily    CALCIUM CITRATE PO Take by mouth (Patient not taking: Reported on 8/2/2021)     No current facility-administered medications on file prior to visit  She has No Known Allergies       Review of Systems   Constitutional: Negative for appetite change, chills, fatigue and fever  HENT: Negative for sore throat and trouble swallowing  Eyes: Negative for redness  Respiratory: Negative for shortness of breath  Cardiovascular: Negative for chest pain and palpitations  Gastrointestinal: Negative for abdominal pain, constipation and diarrhea  Genitourinary: Positive for dysuria  Negative for hematuria  Musculoskeletal: Positive for back pain  Negative for neck pain  Skin: Negative for rash  Neurological: Positive for headaches  Negative for seizures and weakness  Hematological: Negative for adenopathy  Psychiatric/Behavioral: Negative for confusion  The patient is not nervous/anxious            Objective:      /60 (BP Location: Right arm, Patient Position: Sitting, Cuff Size: Standard)   Pulse 69   Temp 98 7 °F (37 1 °C) (Tympanic)   Wt 83 9 kg (185 lb)   SpO2 98%   BMI 30 79 kg/m²     Results Reviewed     None          Recent Results (from the past 1344 hour(s))   Urinalysis with microscopic    Collection Time: 04/12/22  4:17 PM   Result Value Ref Range    Color, UA Colorless     Clarity, UA Clear     Specific [place], UA 1 005 1 003 - 1 030    pH, UA 6 0 4 5, 5 0, 5 5, 6 0, 6 5, 7 0, 7 5, 8 0    Leukocytes, UA Negative Negative    Nitrite, UA Negative Negative    Protein, UA Negative Negative mg/dl    Glucose, UA Negative Negative mg/dl    Ketones, UA Negative Negative mg/dl    Urobilinogen, UA <2 0 <2 0 mg/dl mg/dl    Bilirubin, UA Negative Negative    Blood, UA Trace (A) Negative    RBC, UA None Seen None Seen, 1-2 /hpf    WBC, UA 1-2 None Seen, 1-2 /hpf    Epithelial Cells Occasional None Seen, Occasional /hpf    Bacteria, UA None Seen None Seen, Occasional /hpf   Urine culture    Collection Time: 04/12/22  4:17 PM    Specimen: Urine   Result Value Ref Range    Urine Culture 10,000-19,000 cfu/ml Klebsiella pneumoniae (A)        Susceptibility    Klebsiella pneumoniae - KAREN     ZID Performed Yes       Amoxicillin + Clavulanate <=8/4 Susceptible ug/ml     Ampicillin ($$) >16 00 Resistant ug/ml     Ampicillin + Sulbactam ($) <=4/2 Susceptible ug/ml     Aztreonam ($$$)  <=4 Susceptible ug/ml     Cefazolin ($) <=2 00 Susceptible ug/ml     Ciprofloxacin ($)  <=0 25 Susceptible ug/ml     Gentamicin ($$) <=2 Susceptible ug/ml     Levofloxacin ($) <=0 50 Susceptible ug/ml     Nitrofurantoin 64 Intermediate ug/ml     Tetracycline <=4 Susceptible ug/ml     Tobramycin ($) <=2 Susceptible ug/ml     Trimethoprim + Sulfamethoxazole ($$$) <=0 5/9 5 Susceptible ug/ml   Comprehensive metabolic panel    Collection Time: 04/13/22  7:25 AM   Result Value Ref Range    Glucose, Random 88 65 - 99 mg/dL    BUN 11 7 - 25 mg/dL    Creatinine 0 75 0 50 - 1 10 mg/dL    eGFR Non  94 > OR = 60 mL/min/1 73m2    eGFR  108 > OR = 60 mL/min/1 73m2    SL AMB BUN/CREATININE RATIO NOT APPLICABLE 6 - 22 (calc)    Sodium 138 135 - 146 mmol/L    Potassium 4 6 3 5 - 5 3 mmol/L    Chloride 105 98 - 110 mmol/L    CO2 29 20 - 32 mmol/L    Calcium 9 1 8 6 - 10 2 mg/dL    Protein, Total 6 4 6 1 - 8 1 g/dL    Albumin 3 8 3 6 - 5 1 g/dL    Globulin 2 6 1 9 - 3 7 g/dL (calc)    Albumin/Globulin Ratio 1 5 1 0 - 2 5 (calc)    TOTAL BILIRUBIN 0 2 0 2 - 1 2 mg/dL    Alkaline Phosphatase 78 31 - 125 U/L    AST 19 10 - 35 U/L    ALT 13 6 - 29 U/L   Sedimentation rate, automated    Collection Time: 04/13/22  7:25 AM   Result Value Ref Range    Sedimentation Rate 11 < OR = 20 mm/h   CBC and differential    Collection Time: 04/13/22  7:25 AM   Result Value Ref Range    White Blood Cell Count 4 5 3 8 - 10 8 Thousand/uL    Red Blood Cell Count 3 93 3 80 - 5 10 Million/uL    Hemoglobin 12 2 11 7 - 15 5 g/dL    HCT 35 8 35 0 - 45 0 %    MCV 91 1 80 0 - 100 0 fL    MCH 31 0 27 0 - 33 0 pg    MCHC 34 1 32 0 - 36 0 g/dL    RDW 11 8 11 0 - 15 0 %    Platelet Count 159 542 - 400 Thousand/uL    SL AMB MPV 11 9 7 5 - 12 5 fL    Neutrophils (Absolute) 2,484 1,500 - 7,800 cells/uL    Lymphocytes (Absolute) 1,548 850 - 3,900 cells/uL    Monocytes (Absolute) 320 200 - 950 cells/uL    Eosinophils (Absolute) 108 15 - 500 cells/uL    Basophils ABS 41 0 - 200 cells/uL    Neutrophils 55 2 %    Lymphocytes 34 4 %    Monocytes 7 1 %    Eosinophils 2 4 %    Basophils PCT 0 9 %   C-reactive protein    Collection Time: 04/13/22  7:25 AM   Result Value Ref Range    C-Reactive Protein, Quant 0 6 <8 0 mg/L        Physical Exam  Constitutional:       General: She is not in acute distress  Appearance: Normal appearance  She is obese  HENT:      Head: Normocephalic and atraumatic  Nose: Nose normal       Mouth/Throat:      Mouth: Mucous membranes are moist    Eyes:      Extraocular Movements: Extraocular movements intact  Pupils: Pupils are equal, round, and reactive to light  Cardiovascular:      Rate and Rhythm: Normal rate and regular rhythm  Pulses: Normal pulses  Heart sounds: Normal heart sounds  No murmur heard  No friction rub  Pulmonary:      Effort: Pulmonary effort is normal  No respiratory distress  Breath sounds: Normal breath sounds  No wheezing  Abdominal:      General: Abdomen is flat  Bowel sounds are normal  There is no distension  Palpations: Abdomen is soft  There is no mass  Tenderness: There is no abdominal tenderness  There is no guarding  Musculoskeletal:         General: Normal range of motion  Cervical back: Normal range of motion  Neurological:      General: No focal deficit present  Mental Status: She is alert and oriented to person, place, and time  Mental status is at baseline  Cranial Nerves: No cranial nerve deficit     Psychiatric:         Mood and Affect: Mood normal          Behavior: Behavior normal

## 2022-04-21 ENCOUNTER — TELEPHONE (OUTPATIENT)
Dept: INTERNAL MEDICINE CLINIC | Facility: OTHER | Age: 52
End: 2022-04-21

## 2022-04-22 ENCOUNTER — OFFICE VISIT (OUTPATIENT)
Dept: INTERNAL MEDICINE CLINIC | Facility: CLINIC | Age: 52
End: 2022-04-22
Payer: COMMERCIAL

## 2022-04-22 VITALS
TEMPERATURE: 98.7 F | BODY MASS INDEX: 31.32 KG/M2 | HEART RATE: 76 BPM | SYSTOLIC BLOOD PRESSURE: 118 MMHG | OXYGEN SATURATION: 99 % | WEIGHT: 188 LBS | HEIGHT: 65 IN | DIASTOLIC BLOOD PRESSURE: 78 MMHG

## 2022-04-22 DIAGNOSIS — M54.2 NECK PAIN: ICD-10-CM

## 2022-04-22 DIAGNOSIS — G43.109 MIGRAINE WITH AURA AND WITHOUT STATUS MIGRAINOSUS, NOT INTRACTABLE: ICD-10-CM

## 2022-04-22 DIAGNOSIS — K91.2 POSTOPERATIVE MALABSORPTION: ICD-10-CM

## 2022-04-22 DIAGNOSIS — G45.9 TIA (TRANSIENT ISCHEMIC ATTACK): Primary | ICD-10-CM

## 2022-04-22 PROCEDURE — 3008F BODY MASS INDEX DOCD: CPT | Performed by: INTERNAL MEDICINE

## 2022-04-22 PROCEDURE — 99214 OFFICE O/P EST MOD 30 MIN: CPT | Performed by: INTERNAL MEDICINE

## 2022-04-22 RX ORDER — BUTALBITAL, ACETAMINOPHEN AND CAFFEINE 300; 40; 50 MG/1; MG/1; MG/1
1 CAPSULE ORAL DAILY PRN
Qty: 15 CAPSULE | Refills: 0 | Status: SHIPPED | OUTPATIENT
Start: 2022-04-22

## 2022-04-22 NOTE — PROGRESS NOTES
Assessment/Plan: This is a 47-year-old lady who had numbness on the left side of the body  She was evaluated in the emergency room and cardiac enzymes were negative  At this time evaluation for TIA/stroke is being recommended  1  TIA (transient ischemic attack)  Comments:  Had numbness on the whole left side of the body  Imaging of the brain is being recommended  Orders:  -     MRI brain wo contrast; Future; Expected date: 04/22/2022    2  Neck pain  -     XR spine cervical complete 4 or 5 vw non injury; Future; Expected date: 04/22/2022  -     Ambulatory Referral to Physiatry; Future    3  Postoperative malabsorption  Comments:  Labs were reviewed and supplementation to be continued  4  Migraine with aura and without status migrainosus, not intractable  -     Butalbital-APAP-Caffeine (Fioricet) -40 MG CAPS; Take 1 tablet by mouth daily as needed (migraine)                  No problem-specific Assessment & Plan notes found for this encounter  Subjective:      Patient ID: Brown Slaughter is a 54 y o  female  This is a 47-year-old lady who had numbness on the left side of the body  She was evaluated in the emergency room and cardiac enzymes were negative  At this time evaluation for TIA/stroke is being recommended  The following portions of the patient's history were reviewed and updated as appropriate: She  has a past medical history of Asthma, Colon polyp, Gastroduodenitis, Intestinal malabsorption, Migraine, Pyonephrosis, UTI (urinary tract infection), and Vitamin D deficiency    She   Patient Active Problem List    Diagnosis Date Noted    UTI (urinary tract infection) 10/26/2021    Back pain 10/26/2021    Headache 10/26/2021    Cervical strain, acute 10/26/2021    Pyonephrosis 06/15/2021    Iron deficiency anemia 05/04/2021    Migraine without status migrainosus, not intractable 05/04/2021    Postoperative malabsorption 05/04/2021    Intestinal malabsorption     Gastroduodenitis     Asthma     Vitamin D deficiency      She  has a past surgical history that includes Laparoscopic gastric banding (2005); Gastric bypass (06/2008); [place] tooth extraction; Dilation and curettage of uterus (2017); Abdominal surgery; Tubal ligation; CT cystogram (5/23/2017); and CT cystogram (5/23/2017)  Her family history includes Breast cancer in her family; Colon cancer in her family; Diabetes in her family; Gallbladder disease in her family; Heart disease in her family; Hypertension in her family; Kidney cancer in her family; Lung cancer in her family; Ovarian cancer in her family; Prostate cancer in her family; Stomach cancer in her family; Thyroid disease in her family and mother  She  reports that she has been smoking  She has never used smokeless tobacco  She reports current alcohol use  She reports that she does not use drugs  Current Outpatient Medications   Medication Sig Dispense Refill    cephalexin (KEFLEX) 500 mg capsule Take 1 capsule (500 mg total) by mouth every 6 (six) hours for 10 days 40 capsule 0    cloNIDine (CATAPRES) 0 1 mg tablet Take 0 1 mg by mouth daily Taking one daily       ergocalciferol (VITAMIN D2) 50,000 units 2 CAPSULES ONCE A WEEK 8 capsule 3    ferrous sulfate 324 (65 Fe) mg TAKE 1 TABLET (324 MG TOTAL) BY MOUTH 2 (TWO) TIMES A DAY BEFORE MEALS 60 tablet 0    FOLIC ACID PO Take by mouth daily        Multiple Vitamin (Daily-Isra Multivitamin) TABS TAKE 1 TABLET BY MOUTH EVERY DAY 90 tablet 3    vitamin B-12 (VITAMIN B-12) 1,000 mcg tablet Take by mouth daily      Vyvanse 60 MG capsule Take 60 mg by mouth daily      Butalbital-APAP-Caffeine (Fioricet) -40 MG CAPS Take 1 tablet by mouth daily as needed (migraine) 15 capsule 0    CALCIUM CITRATE PO Take by mouth (Patient not taking: Reported on 8/2/2021)       No current facility-administered medications for this visit       Current Outpatient Medications on File Prior to Visit   Medication Sig  cephalexin (KEFLEX) 500 mg capsule Take 1 capsule (500 mg total) by mouth every 6 (six) hours for 10 days    cloNIDine (CATAPRES) 0 1 mg tablet Take 0 1 mg by mouth daily Taking one daily     ergocalciferol (VITAMIN D2) 50,000 units 2 CAPSULES ONCE A WEEK    ferrous sulfate 324 (65 Fe) mg TAKE 1 TABLET (324 MG TOTAL) BY MOUTH 2 (TWO) TIMES A DAY BEFORE MEALS    FOLIC ACID PO Take by mouth daily      Multiple Vitamin (Daily-Isra Multivitamin) TABS TAKE 1 TABLET BY MOUTH EVERY DAY    vitamin B-12 (VITAMIN B-12) 1,000 mcg tablet Take by mouth daily    Vyvanse 60 MG capsule Take 60 mg by mouth daily    CALCIUM CITRATE PO Take by mouth (Patient not taking: Reported on 8/2/2021)     No current facility-administered medications on file prior to visit  She has No Known Allergies       Review of Systems   Constitutional: Negative for appetite change, chills, fatigue and fever  HENT: Negative for sore throat and trouble swallowing  Eyes: Negative for redness  Respiratory: Negative for shortness of breath  Cardiovascular: Negative for chest pain and palpitations  Gastrointestinal: Negative for abdominal pain, constipation and diarrhea  Genitourinary: Negative for dysuria and hematuria  Musculoskeletal: Negative for back pain and neck pain  Skin: Negative for rash  Neurological: Positive for weakness and numbness  Negative for seizures and headaches  Hematological: Negative for adenopathy  Psychiatric/Behavioral: Negative for confusion  The patient is not nervous/anxious            Objective:      /78 (BP Location: Left arm, Patient Position: Sitting, Cuff Size: Large)   Pulse 76   Temp 98 7 °F (37 1 °C) (Temporal)   Ht 5' 5" (1 651 m)   Wt 85 3 kg (188 lb)   SpO2 99% Comment: ra  BMI 31 28 kg/m²     Results Reviewed     None          Recent Results (from the past 1344 hour(s))   Urinalysis with microscopic    Collection Time: 04/12/22  4:17 PM   Result Value Ref Range    Color, UA Colorless     Clarity, UA Clear     Specific [place], UA 1 005 1 003 - 1 030    pH, UA 6 0 4 5, 5 0, 5 5, 6 0, 6 5, 7 0, 7 5, 8 0    Leukocytes, UA Negative Negative    Nitrite, UA Negative Negative    Protein, UA Negative Negative mg/dl    Glucose, UA Negative Negative mg/dl    Ketones, UA Negative Negative mg/dl    Urobilinogen, UA <2 0 <2 0 mg/dl mg/dl    Bilirubin, UA Negative Negative    Blood, UA Trace (A) Negative    RBC, UA None Seen None Seen, 1-2 /hpf    WBC, UA 1-2 None Seen, 1-2 /hpf    Epithelial Cells Occasional None Seen, Occasional /hpf    Bacteria, UA None Seen None Seen, Occasional /hpf   Urine culture    Collection Time: 04/12/22  4:17 PM    Specimen: Urine   Result Value Ref Range    Urine Culture 10,000-19,000 cfu/ml Klebsiella pneumoniae (A)        Susceptibility    Klebsiella pneumoniae - KAREN     ZID Performed Yes       Amoxicillin + Clavulanate <=8/4 Susceptible ug/ml     Ampicillin ($$) >16 00 Resistant ug/ml     Ampicillin + Sulbactam ($) <=4/2 Susceptible ug/ml     Aztreonam ($$$)  <=4 Susceptible ug/ml     Cefazolin ($) <=2 00 Susceptible ug/ml     Ciprofloxacin ($)  <=0 25 Susceptible ug/ml     Gentamicin ($$) <=2 Susceptible ug/ml     Levofloxacin ($) <=0 50 Susceptible ug/ml     Nitrofurantoin 64 Intermediate ug/ml     Tetracycline <=4 Susceptible ug/ml     Tobramycin ($) <=2 Susceptible ug/ml     Trimethoprim + Sulfamethoxazole ($$$) <=0 5/9 5 Susceptible ug/ml   Comprehensive metabolic panel    Collection Time: 04/13/22  7:25 AM   Result Value Ref Range    Glucose, Random 88 65 - 99 mg/dL    BUN 11 7 - 25 mg/dL    Creatinine 0 75 0 50 - 1 10 mg/dL    eGFR Non  94 > OR = 60 mL/min/1 73m2    eGFR  108 > OR = 60 mL/min/1 73m2    SL AMB BUN/CREATININE RATIO NOT APPLICABLE 6 - 22 (calc)    Sodium 138 135 - 146 mmol/L    Potassium 4 6 3 5 - 5 3 mmol/L    Chloride 105 98 - 110 mmol/L    CO2 29 20 - 32 mmol/L    Calcium 9 1 8 6 - 10 2 mg/dL    Protein, Total 6 4 6 1 - 8 1 g/dL    Albumin 3 8 3 6 - 5 1 g/dL    Globulin 2 6 1 9 - 3 7 g/dL (calc)    Albumin/Globulin Ratio 1 5 1 0 - 2 5 (calc)    TOTAL BILIRUBIN 0 2 0 2 - 1 2 mg/dL    Alkaline Phosphatase 78 31 - 125 U/L    AST 19 10 - 35 U/L    ALT 13 6 - 29 U/L   Sedimentation rate, automated    Collection Time: 04/13/22  7:25 AM   Result Value Ref Range    Sedimentation Rate 11 < OR = 20 mm/h   CBC and differential    Collection Time: 04/13/22  7:25 AM   Result Value Ref Range    White Blood Cell Count 4 5 3 8 - 10 8 Thousand/uL    Red Blood Cell Count 3 93 3 80 - 5 10 Million/uL    Hemoglobin 12 2 11 7 - 15 5 g/dL    HCT 35 8 35 0 - 45 0 %    MCV 91 1 80 0 - 100 0 fL    MCH 31 0 27 0 - 33 0 pg    MCHC 34 1 32 0 - 36 0 g/dL    RDW 11 8 11 0 - 15 0 %    Platelet Count 912 906 - 400 Thousand/uL    SL AMB MPV 11 9 7 5 - 12 5 fL    Neutrophils (Absolute) 2,484 1,500 - 7,800 cells/uL    Lymphocytes (Absolute) 1,548 850 - 3,900 cells/uL    Monocytes (Absolute) 320 200 - 950 cells/uL    Eosinophils (Absolute) 108 15 - 500 cells/uL    Basophils ABS 41 0 - 200 cells/uL    Neutrophils 55 2 %    Lymphocytes 34 4 %    Monocytes 7 1 %    Eosinophils 2 4 %    Basophils PCT 0 9 %   C-reactive protein    Collection Time: 04/13/22  7:25 AM   Result Value Ref Range    C-Reactive Protein, Quant 0 6 <8 0 mg/L        Physical Exam  Constitutional:       General: She is not in acute distress  Appearance: Normal appearance  HENT:      Head: Normocephalic and atraumatic  Nose: Nose normal       Mouth/Throat:      Mouth: Mucous membranes are moist    Eyes:      Extraocular Movements: Extraocular movements intact  Pupils: Pupils are equal, round, and reactive to light  Cardiovascular:      Rate and Rhythm: Normal rate and regular rhythm  Pulses: Normal pulses  Heart sounds: Normal heart sounds  No murmur heard  No friction rub  Pulmonary:      Effort: Pulmonary effort is normal  No respiratory distress  Breath sounds: Normal breath sounds  No wheezing  Abdominal:      General: Abdomen is flat  Bowel sounds are normal  There is no distension  Palpations: Abdomen is soft  There is no mass  Tenderness: There is no abdominal tenderness  There is no guarding  Musculoskeletal:         General: Normal range of motion  Cervical back: Normal range of motion  Skin:     General: Skin is warm  Neurological:      General: No focal deficit present  Mental Status: She is alert and oriented to person, place, and time  Mental status is at baseline  Cranial Nerves: No cranial nerve deficit  Sensory: Sensory deficit present  Psychiatric:         Mood and Affect: Mood normal          Behavior: Behavior normal        BMI Counseling: Body mass index is 31 28 kg/m²  The BMI is above normal  Nutrition recommendations include reducing portion sizes, decreasing overall calorie intake and 3-5 servings of fruits/vegetables daily

## 2022-04-25 ENCOUNTER — TELEPHONE (OUTPATIENT)
Dept: ADMINISTRATIVE | Facility: OTHER | Age: 52
End: 2022-04-25

## 2022-04-25 NOTE — TELEPHONE ENCOUNTER
----- Message from Raeann Cox sent at 4/22/2022  4:39 PM EDT -----  Regarding: mammo  04/22/22 4:39 PM    Hello, our patient Hugo Atwood has had Mammogram completed/performed  Please assist in updating the patient chart by pulling the Care Everywhere (CE) document  The date of service is 01/10/2022       Thank you,  Erma Pepper MA  [facility]

## 2022-04-25 NOTE — TELEPHONE ENCOUNTER
Upon review of the In Basket request we were able to locate, review, and update the patient chart as requested for Mammogram     Any additional questions or concerns should be emailed to the Practice Liaisons via [email or web address] email, please do not reply via In Basket      Thank you  Naina Beltre

## 2022-09-21 ENCOUNTER — TELEPHONE (OUTPATIENT)
Dept: INTERNAL MEDICINE CLINIC | Facility: CLINIC | Age: 52
End: 2022-09-21

## 2022-09-28 ENCOUNTER — OFFICE VISIT (OUTPATIENT)
Dept: INTERNAL MEDICINE CLINIC | Facility: CLINIC | Age: 52
End: 2022-09-28
Payer: COMMERCIAL

## 2022-09-28 VITALS
TEMPERATURE: 97.5 F | BODY MASS INDEX: 30.82 KG/M2 | OXYGEN SATURATION: 98 % | HEART RATE: 81 BPM | DIASTOLIC BLOOD PRESSURE: 64 MMHG | SYSTOLIC BLOOD PRESSURE: 110 MMHG | WEIGHT: 185 LBS | HEIGHT: 65 IN

## 2022-09-28 DIAGNOSIS — M77.11 LATERAL EPICONDYLITIS OF RIGHT ELBOW: ICD-10-CM

## 2022-09-28 DIAGNOSIS — K90.9 INTESTINAL MALABSORPTION, UNSPECIFIED TYPE: Primary | ICD-10-CM

## 2022-09-28 PROCEDURE — 99214 OFFICE O/P EST MOD 30 MIN: CPT | Performed by: INTERNAL MEDICINE

## 2022-09-28 RX ORDER — ERGOCALCIFEROL 1.25 MG/1
CAPSULE ORAL
Qty: 8 CAPSULE | Refills: 3 | Status: CANCELLED | OUTPATIENT
Start: 2022-09-28

## 2022-09-28 NOTE — PROGRESS NOTES
Assessment/Plan:           1  Intestinal malabsorption, unspecified type  -     CBC and differential; Future  -     Comprehensive metabolic panel; Future  -     Urinalysis with microscopic  -     Vitamin B12; Future  -     Vitamin D 25 hydroxy; Future  -     Vitamin A; Future  -     Vitamin B1, whole blood; Future  -     Zinc; Future  -     Iron Saturation %; Future    2  Lateral epicondylitis of right elbow  -     Diclofenac Sodium (VOLTAREN) 1 %; Apply 2 g topically 4 (four) times a day  -     Ambulatory Referral to Orthopedic Surgery; Future         1  Intestinal malabsorption, unspecified type    - CBC and differential; Future  - Comprehensive metabolic panel; Future  - Urinalysis with microscopic  - Vitamin B12; Future  - Vitamin D 25 hydroxy; Future  - Vitamin A; Future  - Vitamin B1, whole blood; Future  - Zinc; Future  - Iron Saturation %; Future    2  Lateral epicondylitis of right elbow    - Diclofenac Sodium (VOLTAREN) 1 %; Apply 2 g topically 4 (four) times a day  Dispense: 100 g; Refill: 1  - Ambulatory Referral to Orthopedic Surgery; Future       No problem-specific Assessment & Plan notes found for this encounter  Subjective:      Patient ID: Marisa Philippe is a 54 y o  female  HPI    The following portions of the patient's history were reviewed and updated as appropriate: She  has a past medical history of Asthma, Colon polyp, Gastroduodenitis, Intestinal malabsorption, Migraine, Pyonephrosis, UTI (urinary tract infection), and Vitamin D deficiency    She   Patient Active Problem List    Diagnosis Date Noted    UTI (urinary tract infection) 10/26/2021    Back pain 10/26/2021    Headache 10/26/2021    Cervical strain, acute 10/26/2021    Pyonephrosis 06/15/2021    Iron deficiency anemia 05/04/2021    Migraine without status migrainosus, not intractable 05/04/2021    Postoperative malabsorption 05/04/2021    Intestinal malabsorption     Gastroduodenitis     Asthma     Vitamin D deficiency      She  has a past surgical history that includes Laparoscopic gastric banding (2005); Gastric bypass (06/2008); [place] tooth extraction; Dilation and curettage of uterus (2017); Abdominal surgery; Tubal ligation; CT cystogram (5/23/2017); and CT cystogram (5/23/2017)  Her family history includes Breast cancer in her family; Colon cancer in her family; Diabetes in her family; Gallbladder disease in her family; Heart disease in her family; Hypertension in her family; Kidney cancer in her family; Lung cancer in her family; Ovarian cancer in her family; Prostate cancer in her family; Stomach cancer in her family; Thyroid disease in her family and mother  She  reports that she has been smoking  She has never used smokeless tobacco  She reports current alcohol use  She reports that she does not use drugs  Current Outpatient Medications   Medication Sig Dispense Refill    Butalbital-APAP-Caffeine (Fioricet) -40 MG CAPS Take 1 tablet by mouth daily as needed (migraine) 15 capsule 0    CALCIUM CITRATE PO Take by mouth      Diclofenac Sodium (VOLTAREN) 1 % Apply 2 g topically 4 (four) times a day 100 g 1    ergocalciferol (VITAMIN D2) 50,000 units 2 CAPSULES ONCE A WEEK 8 capsule 3    ferrous sulfate 324 (65 Fe) mg TAKE 1 TABLET (324 MG TOTAL) BY MOUTH 2 (TWO) TIMES A DAY BEFORE MEALS 60 tablet 0    FOLIC ACID PO Take by mouth daily        Multiple Vitamin (Daily-Isra Multivitamin) TABS TAKE 1 TABLET BY MOUTH EVERY DAY 90 tablet 3    vitamin B-12 (VITAMIN B-12) 1,000 mcg tablet Take by mouth daily      Vyvanse 60 MG capsule Take 60 mg by mouth daily      cloNIDine (CATAPRES) 0 1 mg tablet Take 0 1 mg by mouth daily Taking one daily  (Patient not taking: Reported on 9/28/2022)       No current facility-administered medications for this visit       Current Outpatient Medications on File Prior to Visit   Medication Sig    Butalbital-APAP-Caffeine (Fioricet) -40 MG CAPS Take 1 tablet by mouth daily as needed (migraine)    CALCIUM CITRATE PO Take by mouth    ergocalciferol (VITAMIN D2) 50,000 units 2 CAPSULES ONCE A WEEK    ferrous sulfate 324 (65 Fe) mg TAKE 1 TABLET (324 MG TOTAL) BY MOUTH 2 (TWO) TIMES A DAY BEFORE MEALS    FOLIC ACID PO Take by mouth daily      Multiple Vitamin (Daily-Isra Multivitamin) TABS TAKE 1 TABLET BY MOUTH EVERY DAY    vitamin B-12 (VITAMIN B-12) 1,000 mcg tablet Take by mouth daily    Vyvanse 60 MG capsule Take 60 mg by mouth daily    cloNIDine (CATAPRES) 0 1 mg tablet Take 0 1 mg by mouth daily Taking one daily  (Patient not taking: Reported on 9/28/2022)     No current facility-administered medications on file prior to visit  She has No Known Allergies       Review of Systems   Constitutional: Negative for appetite change, chills, fatigue and fever  HENT: Negative for sore throat and trouble swallowing  Eyes: Negative for redness  Respiratory: Negative for shortness of breath  Cardiovascular: Negative for chest pain and palpitations  Gastrointestinal: Negative for abdominal pain, constipation and diarrhea  Genitourinary: Negative for dysuria and hematuria  Musculoskeletal: Positive for arthralgias  Negative for back pain and neck pain  Right elbow pain   Skin: Negative for rash  Neurological: Negative for seizures, weakness and headaches  Hematological: Negative for adenopathy  Psychiatric/Behavioral: Negative for confusion  The patient is not nervous/anxious  Objective:      /64 (BP Location: Left arm, Patient Position: Sitting, Cuff Size: Adult)   Pulse 81   Temp 97 5 °F (36 4 °C) (Temporal)   Ht 5' 5" (1 651 m)   Wt 83 9 kg (185 lb)   SpO2 98%   BMI 30 79 kg/m²     Results Reviewed     None          No results found for this or any previous visit (from the past 1344 hour(s))  Physical Exam  Constitutional:       General: She is not in acute distress  Appearance: Normal appearance     HENT: Head: Normocephalic and atraumatic  Nose: Nose normal       Mouth/Throat:      Mouth: Mucous membranes are moist    Eyes:      Extraocular Movements: Extraocular movements intact  Pupils: Pupils are equal, round, and reactive to light  Cardiovascular:      Rate and Rhythm: Normal rate and regular rhythm  Pulses: Normal pulses  Heart sounds: Normal heart sounds  No murmur heard  No friction rub  Pulmonary:      Effort: Pulmonary effort is normal  No respiratory distress  Breath sounds: Normal breath sounds  No wheezing  Abdominal:      General: Abdomen is flat  Bowel sounds are normal  There is no distension  Palpations: Abdomen is soft  There is no mass  Tenderness: There is no abdominal tenderness  There is no guarding  Musculoskeletal:         General: Tenderness present  Normal range of motion  Cervical back: Normal range of motion  Neurological:      General: No focal deficit present  Mental Status: She is alert and oriented to person, place, and time  Mental status is at baseline  Cranial Nerves: No cranial nerve deficit     Psychiatric:         Mood and Affect: Mood normal          Behavior: Behavior normal

## 2022-09-29 ENCOUNTER — TELEPHONE (OUTPATIENT)
Dept: OBGYN CLINIC | Facility: MEDICAL CENTER | Age: 52
End: 2022-09-29

## 2022-09-29 NOTE — TELEPHONE ENCOUNTER
Cherise at [facility] calling to set appointment, appt set

## 2022-09-30 ENCOUNTER — TELEPHONE (OUTPATIENT)
Dept: OBGYN CLINIC | Facility: CLINIC | Age: 52
End: 2022-09-30

## 2022-10-08 LAB
25(OH)D3 SERPL-MCNC: 68 NG/ML (ref 30–100)
ALBUMIN SERPL-MCNC: 4 G/DL (ref 3.6–5.1)
ALBUMIN/GLOB SERPL: 1.4 (CALC) (ref 1–2.5)
ALP SERPL-CCNC: 86 U/L (ref 31–125)
ALT SERPL-CCNC: 16 U/L (ref 6–29)
APPEARANCE UR: ABNORMAL
AST SERPL-CCNC: 21 U/L (ref 10–35)
BACTERIA UR QL AUTO: ABNORMAL /HPF
BASOPHILS # BLD AUTO: 61 CELLS/UL (ref 0–200)
BASOPHILS NFR BLD AUTO: 1.3 %
BILIRUB SERPL-MCNC: 0.4 MG/DL (ref 0.2–1.2)
BILIRUB UR QL STRIP: NEGATIVE
BUN SERPL-MCNC: 11 MG/DL (ref 7–25)
BUN/CREAT SERPL: NORMAL (CALC) (ref 6–22)
CALCIUM SERPL-MCNC: 9.4 MG/DL (ref 8.6–10.2)
CHLORIDE SERPL-SCNC: 106 MMOL/L (ref 98–110)
CO2 SERPL-SCNC: 26 MMOL/L (ref 20–32)
COLOR UR: YELLOW
CREAT SERPL-MCNC: 0.69 MG/DL (ref 0.5–0.99)
EOSINOPHIL # BLD AUTO: 141 CELLS/UL (ref 15–500)
EOSINOPHIL NFR BLD AUTO: 3 %
ERYTHROCYTE [DISTWIDTH] IN BLOOD BY AUTOMATED COUNT: 11.9 % (ref 11–15)
GFR/BSA.PRED SERPLBLD CYS-BASED-ARV: 106 ML/MIN/1.73M2
GLOBULIN SER CALC-MCNC: 2.8 G/DL (CALC) (ref 1.9–3.7)
GLUCOSE SERPL-MCNC: 98 MG/DL (ref 65–99)
GLUCOSE UR QL STRIP: NEGATIVE
HCT VFR BLD AUTO: 38.3 % (ref 35–45)
HGB BLD-MCNC: 12.3 G/DL (ref 11.7–15.5)
HGB UR QL STRIP: ABNORMAL
HYALINE CASTS #/AREA URNS LPF: ABNORMAL /LPF
IRON SATN MFR SERPL: 14 % (CALC) (ref 16–45)
IRON SERPL-MCNC: 49 MCG/DL (ref 40–190)
KETONES UR QL STRIP: NEGATIVE
LEUKOCYTE ESTERASE UR QL STRIP: NEGATIVE
LYMPHOCYTES # BLD AUTO: 1781 CELLS/UL (ref 850–3900)
LYMPHOCYTES NFR BLD AUTO: 37.9 %
MCH RBC QN AUTO: 29.7 PG (ref 27–33)
MCHC RBC AUTO-ENTMCNC: 32.1 G/DL (ref 32–36)
MCV RBC AUTO: 92.5 FL (ref 80–100)
MONOCYTES # BLD AUTO: 334 CELLS/UL (ref 200–950)
MONOCYTES NFR BLD AUTO: 7.1 %
NEUTROPHILS # BLD AUTO: 2383 CELLS/UL (ref 1500–7800)
NEUTROPHILS NFR BLD AUTO: 50.7 %
NITRITE UR QL STRIP: NEGATIVE
PH UR STRIP: 6 [PH] (ref 5–8)
PLATELET # BLD AUTO: 232 THOUSAND/UL (ref 140–400)
PMV BLD REES-ECKER: 12 FL (ref 7.5–12.5)
POTASSIUM SERPL-SCNC: 4.4 MMOL/L (ref 3.5–5.3)
PROT SERPL-MCNC: 6.8 G/DL (ref 6.1–8.1)
PROT UR QL STRIP: NEGATIVE
RBC # BLD AUTO: 4.14 MILLION/UL (ref 3.8–5.1)
RBC #/AREA URNS HPF: ABNORMAL /HPF
SODIUM SERPL-SCNC: 140 MMOL/L (ref 135–146)
SP GR UR STRIP: 1.02 (ref 1–1.03)
SQUAMOUS #/AREA URNS HPF: ABNORMAL /HPF
TIBC SERPL-MCNC: 342 MCG/DL (CALC) (ref 250–450)
VIT A SERPL-MCNC: 46 MCG/DL (ref 38–98)
VIT B1 BLD-SCNC: 124 NMOL/L (ref 78–185)
VIT B12 SERPL-MCNC: 1116 PG/ML (ref 200–1100)
WBC # BLD AUTO: 4.7 THOUSAND/UL (ref 3.8–10.8)
WBC #/AREA URNS HPF: ABNORMAL /HPF
ZINC SERPL-MCNC: 68 MCG/DL (ref 60–130)

## 2022-10-10 ENCOUNTER — OFFICE VISIT (OUTPATIENT)
Dept: OBGYN CLINIC | Facility: MEDICAL CENTER | Age: 52
End: 2022-10-10
Payer: COMMERCIAL

## 2022-10-10 VITALS
DIASTOLIC BLOOD PRESSURE: 85 MMHG | HEART RATE: 76 BPM | SYSTOLIC BLOOD PRESSURE: 123 MMHG | WEIGHT: 184.2 LBS | BODY MASS INDEX: 30.69 KG/M2 | HEIGHT: 65 IN

## 2022-10-10 DIAGNOSIS — M77.12 LATERAL EPICONDYLITIS OF BOTH ELBOWS: Primary | ICD-10-CM

## 2022-10-10 DIAGNOSIS — M77.11 LATERAL EPICONDYLITIS OF BOTH ELBOWS: Primary | ICD-10-CM

## 2022-10-10 PROCEDURE — 20551 NJX 1 TENDON ORIGIN/INSJ: CPT | Performed by: SURGERY

## 2022-10-10 PROCEDURE — 99203 OFFICE O/P NEW LOW 30 MIN: CPT | Performed by: SURGERY

## 2022-10-10 RX ORDER — BETAMETHASONE SODIUM PHOSPHATE AND BETAMETHASONE ACETATE 3; 3 MG/ML; MG/ML
6 INJECTION, SUSPENSION INTRA-ARTICULAR; INTRALESIONAL; INTRAMUSCULAR; SOFT TISSUE
Status: COMPLETED | OUTPATIENT
Start: 2022-10-10 | End: 2022-10-10

## 2022-10-10 RX ORDER — LIDOCAINE HYDROCHLORIDE 5 MG/ML
2.5 INJECTION, SOLUTION INFILTRATION; PERINEURAL
Status: COMPLETED | OUTPATIENT
Start: 2022-10-10 | End: 2022-10-10

## 2022-10-10 RX ADMIN — LIDOCAINE HYDROCHLORIDE 2.5 ML: 5 INJECTION, SOLUTION INFILTRATION; PERINEURAL at 11:29

## 2022-10-10 RX ADMIN — BETAMETHASONE SODIUM PHOSPHATE AND BETAMETHASONE ACETATE 6 MG: 3; 3 INJECTION, SUSPENSION INTRA-ARTICULAR; INTRALESIONAL; INTRAMUSCULAR; SOFT TISSUE at 11:29

## 2022-10-10 NOTE — PROGRESS NOTES
Hand Surgery History and Physical    11:25 AM  10/22/2022  [id number]  [facility]!      HPI:  Pt is a 53 yo female who presents for evaluation of her bilateral elbows  She notes pain over her lateral elbows bilaterally  He notes that her symptoms started in July after painting  She notes similar symptoms in 2011 which eventually resolved  Her present symptom are daily, and are limiting her activities  She has been performing some exercises of late without improvement        Past Medical History:   Diagnosis Date   • Asthma    • Colon polyp    • Gastroduodenitis    • Intestinal malabsorption    • Migraine    • Pyonephrosis    • UTI (urinary tract infection)    • Vitamin D deficiency        Past Surgical History:   Procedure Laterality Date   • ABDOMINAL SURGERY      Twisted bowel and internal hernia   • CT CYSTOGRAM  5/23/2017   • CT CYSTOGRAM  5/23/2017   • DILATION AND CURETTAGE OF UTERUS  2017   • GASTRIC BYPASS  06/2008   • LAPAROSCOPIC GASTRIC BANDING  2005   • TUBAL LIGATION     • WISDOM TOOTH EXTRACTION         Family History   Problem Relation Age of Onset   • Thyroid disease Mother    • Heart disease Family    • Diabetes Family    • Hypertension Family    • Breast cancer Family    • Colon cancer Family    • Prostate cancer Family    • Gallbladder disease Family    • Stomach cancer Family    • Ovarian cancer Family    • Kidney cancer Family    • Lung cancer Family    • Thyroid disease Family        Review of Systems - General ROS: negative  Ophthalmic ROS: negative  ENT ROS: negative  Respiratory ROS: negative  Cardiovascular ROS: no chest pain or dyspnea on exertion  Neurological ROS: negative  Dermatological ROS: negative    Vitals:    10/10/22 1054   BP: 123/85   Pulse: 76       Physical Examination:  General:  NAD  HEENT:  EOMI, perrla, normal ear morphology  Chest:  Unlabored breathing  Heart:  Regular pulse  Abd:  No distension  Extrem: BUE:  Tender over elbow lateral epicondyles, no erythema, no induration, able to make full fist complexes, SILT  Skin:  Dry, pink  Neuro:  AAOx3     A/P:  Pt is a 55 yo female with bilateral lateral epicondylitis    -Discussed treatment options   -Recommend exercise program which was provided to patient   -Injections performed today as well  -F/u in 8 weeks  Small joint arthrocentesis  Universal Protocol:  Consent: Verbal consent obtained  Risks and benefits: risks, benefits and alternatives were discussed  Consent given by: patient  Time out: Immediately prior to procedure a "time out" was called to verify the correct patient, procedure, equipment, support staff and site/side marked as required  Timeout called at: 10/10/2022 11:23 AM   Patient understanding: patient states understanding of the procedure being performed  Site marked: the operative site was marked  Patient identity confirmed: verbally with patient    Supporting Documentation  Indications: pain   Procedure Details  Location: bilateral elbowl lateral epicondyles    Preparation: Patient was prepped and draped in the usual sterile fashion  Needle size: 22 G  Ultrasound guidance: no  Approach: lateral  Medications administered: 2 5 mL lidocaine 0 5 %; 6 mg betamethasone acetate-betamethasone sodium phosphate 6 (3-3) mg/mL    Patient tolerance: patient tolerated the procedure well with no immediate complications  Dressing:  Sterile dressing applied

## 2022-10-12 PROBLEM — N13.6 PYONEPHROSIS: Status: RESOLVED | Noted: 2021-06-15 | Resolved: 2022-10-12

## 2022-10-12 PROBLEM — N39.0 UTI (URINARY TRACT INFECTION): Status: RESOLVED | Noted: 2021-10-26 | Resolved: 2022-10-12

## 2022-10-15 ENCOUNTER — HOSPITAL ENCOUNTER (EMERGENCY)
Facility: HOSPITAL | Age: 52
Discharge: HOME | End: 2022-10-16
Attending: SURGERY
Payer: COMMERCIAL
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
## 2022-10-15 PROCEDURE — 82803 BLOOD GASES ANY COMBINATION: CPT

## 2022-10-15 PROCEDURE — 82330 ASSAY OF CALCIUM: CPT

## 2022-10-15 PROCEDURE — 84132 ASSAY OF SERUM POTASSIUM: CPT

## 2022-10-15 PROCEDURE — 85014 HEMATOCRIT: CPT

## 2022-10-15 PROCEDURE — 84295 ASSAY OF SERUM SODIUM: CPT

## 2022-10-15 PROCEDURE — 82947 ASSAY GLUCOSE BLOOD QUANT: CPT

## 2022-10-15 PROCEDURE — NC001 PR NO CHARGE: Performed by: EMERGENCY MEDICINE

## 2022-10-15 PROCEDURE — 99284 EMERGENCY DEPT VISIT MOD MDM: CPT

## 2022-10-16 ENCOUNTER — APPOINTMENT (EMERGENCY)
Dept: RADIOLOGY | Facility: HOSPITAL | Age: 52
End: 2022-10-16
Payer: COMMERCIAL

## 2022-10-16 VITALS
SYSTOLIC BLOOD PRESSURE: 108 MMHG | TEMPERATURE: 98.1 F | RESPIRATION RATE: 18 BRPM | WEIGHT: 190.7 LBS | OXYGEN SATURATION: 99 % | DIASTOLIC BLOOD PRESSURE: 56 MMHG | HEART RATE: 66 BPM
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
## 2022-10-16 LAB
ABO GROUP BLD: NORMAL
ANION GAP SERPL CALCULATED.3IONS-SCNC: 9 MMOL/L (ref 4–13)
APAP SERPL-MCNC: <2 UG/ML (ref 10–20)
BASE EXCESS BLDA CALC-SCNC: -3 MMOL/L (ref -2–3)
BASOPHILS # BLD AUTO: 0.05 THOUSANDS/ΜL (ref 0–0.1)
BASOPHILS NFR BLD AUTO: 1 % (ref 0–1)
BLD GP AB SCN SERPL QL: NEGATIVE
BUN SERPL-MCNC: 9 MG/DL (ref 5–25)
CA-I BLD-SCNC: 1.05 MMOL/L (ref 1.12–1.32)
CALCIUM SERPL-MCNC: 8.5 MG/DL (ref 8.3–10.1)
CHLORIDE SERPL-SCNC: 107 MMOL/L (ref 96–108)
CO2 SERPL-SCNC: 24 MMOL/L (ref 21–32)
CREAT SERPL-MCNC: 0.71 MG/DL (ref 0.6–1.3)
EOSINOPHIL # BLD AUTO: 0.09 THOUSAND/ΜL (ref 0–0.61)
EOSINOPHIL NFR BLD AUTO: 1 % (ref 0–6)
ERYTHROCYTE [DISTWIDTH] IN BLOOD BY AUTOMATED COUNT: 12.8 % (ref 11.6–15.1)
ETHANOL SERPL-MCNC: 171 MG/DL (ref 0–3)
GFR SERPL CREATININE-BSD FRML MDRD: 100 ML/MIN/1.73SQ M
GLUCOSE SERPL-MCNC: 97 MG/DL (ref 65–140)
GLUCOSE SERPL-MCNC: 97 MG/DL (ref 65–140)
HCO3 BLDA-SCNC: 21.4 MMOL/L (ref 24–30)
HCT VFR BLD AUTO: 39.3 % (ref 34.8–46.1)
HCT VFR BLD CALC: 38 % (ref 34.8–46.1)
HGB BLD-MCNC: 12.5 G/DL (ref 11.5–15.4)
HGB BLDA-MCNC: 12.9 G/DL (ref 11.5–15.4)
IMM GRANULOCYTES # BLD AUTO: 0.02 THOUSAND/UL (ref 0–0.2)
IMM GRANULOCYTES NFR BLD AUTO: 0 % (ref 0–2)
LYMPHOCYTES # BLD AUTO: 1.98 THOUSANDS/ΜL (ref 0.6–4.47)
LYMPHOCYTES NFR BLD AUTO: 29 % (ref 14–44)
MCH RBC QN AUTO: 29.1 PG (ref 26.8–34.3)
MCHC RBC AUTO-ENTMCNC: 31.8 G/DL (ref 31.4–37.4)
MCV RBC AUTO: 92 FL (ref 82–98)
MONOCYTES # BLD AUTO: 0.42 THOUSAND/ΜL (ref 0.17–1.22)
MONOCYTES NFR BLD AUTO: 6 % (ref 4–12)
NEUTROPHILS # BLD AUTO: 4.28 THOUSANDS/ΜL (ref 1.85–7.62)
NEUTS SEG NFR BLD AUTO: 63 % (ref 43–75)
NRBC BLD AUTO-RTO: 0 /100 WBCS
PCO2 BLD: 22 MMOL/L (ref 21–32)
PCO2 BLD: 36.2 MM HG (ref 42–50)
PH BLD: 7.38 [PH] (ref 7.3–7.4)
PLATELET # BLD AUTO: 240 THOUSANDS/UL (ref 149–390)
PMV BLD AUTO: 11 FL (ref 8.9–12.7)
PO2 BLD: 39 MM HG (ref 35–45)
POTASSIUM BLD-SCNC: 3.9 MMOL/L (ref 3.5–5.3)
POTASSIUM SERPL-SCNC: 3.9 MMOL/L (ref 3.5–5.3)
RBC # BLD AUTO: 4.29 MILLION/UL (ref 3.81–5.12)
RH BLD: POSITIVE
SALICYLATES SERPL-MCNC: <3 MG/DL (ref 3–20)
SAO2 % BLD FROM PO2: 72 % (ref 60–85)
SODIUM BLD-SCNC: 140 MMOL/L (ref 136–145)
SODIUM SERPL-SCNC: 140 MMOL/L (ref 135–147)
SPECIMEN EXPIRATION DATE: NORMAL
SPECIMEN SOURCE: ABNORMAL
WBC # BLD AUTO: 6.84 THOUSAND/UL (ref 4.31–10.16)

## 2022-10-16 PROCEDURE — 86850 RBC ANTIBODY SCREEN: CPT | Performed by: STUDENT IN AN ORGANIZED HEALTH CARE EDUCATION/TRAINING PROGRAM

## 2022-10-16 PROCEDURE — 71260 CT THORAX DX C+: CPT

## 2022-10-16 PROCEDURE — 85025 COMPLETE CBC W/AUTO DIFF WBC: CPT | Performed by: STUDENT IN AN ORGANIZED HEALTH CARE EDUCATION/TRAINING PROGRAM

## 2022-10-16 PROCEDURE — 80179 DRUG ASSAY SALICYLATE: CPT | Performed by: STUDENT IN AN ORGANIZED HEALTH CARE EDUCATION/TRAINING PROGRAM

## 2022-10-16 PROCEDURE — 80143 DRUG ASSAY ACETAMINOPHEN: CPT | Performed by: STUDENT IN AN ORGANIZED HEALTH CARE EDUCATION/TRAINING PROGRAM

## 2022-10-16 PROCEDURE — 99203 OFFICE O/P NEW LOW 30 MIN: CPT | Performed by: SURGERY

## 2022-10-16 PROCEDURE — 86900 BLOOD TYPING SEROLOGIC ABO: CPT | Performed by: STUDENT IN AN ORGANIZED HEALTH CARE EDUCATION/TRAINING PROGRAM

## 2022-10-16 PROCEDURE — 90715 TDAP VACCINE 7 YRS/> IM: CPT

## 2022-10-16 PROCEDURE — NC001 PR NO CHARGE: Performed by: SURGERY

## 2022-10-16 PROCEDURE — 96374 THER/PROPH/DIAG INJ IV PUSH: CPT

## 2022-10-16 PROCEDURE — 86901 BLOOD TYPING SEROLOGIC RH(D): CPT | Performed by: STUDENT IN AN ORGANIZED HEALTH CARE EDUCATION/TRAINING PROGRAM

## 2022-10-16 PROCEDURE — 82077 ASSAY SPEC XCP UR&BREATH IA: CPT | Performed by: STUDENT IN AN ORGANIZED HEALTH CARE EDUCATION/TRAINING PROGRAM

## 2022-10-16 PROCEDURE — 36415 COLL VENOUS BLD VENIPUNCTURE: CPT | Performed by: STUDENT IN AN ORGANIZED HEALTH CARE EDUCATION/TRAINING PROGRAM

## 2022-10-16 PROCEDURE — 74177 CT ABD & PELVIS W/CONTRAST: CPT

## 2022-10-16 PROCEDURE — 70450 CT HEAD/BRAIN W/O DYE: CPT

## 2022-10-16 PROCEDURE — 72125 CT NECK SPINE W/O DYE: CPT

## 2022-10-16 PROCEDURE — 80048 BASIC METABOLIC PNL TOTAL CA: CPT | Performed by: STUDENT IN AN ORGANIZED HEALTH CARE EDUCATION/TRAINING PROGRAM

## 2022-10-16 PROCEDURE — 93308 TTE F-UP OR LMTD: CPT | Performed by: SURGERY

## 2022-10-16 PROCEDURE — 76705 ECHO EXAM OF ABDOMEN: CPT | Performed by: SURGERY

## 2022-10-16 PROCEDURE — 90471 IMMUNIZATION ADMIN: CPT

## 2022-10-16 RX ORDER — INSULIN LISPRO 100 [IU]/ML
1-6 INJECTION, SOLUTION INTRAVENOUS; SUBCUTANEOUS
Status: DISCONTINUED | OUTPATIENT
Start: 2022-10-16 | End: 2022-10-16

## 2022-10-16 RX ORDER — ACETAMINOPHEN 325 MG/1
650 TABLET ORAL EVERY 4 HOURS PRN
Status: DISCONTINUED | OUTPATIENT
Start: 2022-10-16 | End: 2022-10-16 | Stop reason: HOSPADM

## 2022-10-16 RX ORDER — HYDROMORPHONE HCL/PF 1 MG/ML
0.5 SYRINGE (ML) INJECTION
Status: DISCONTINUED | OUTPATIENT
Start: 2022-10-16 | End: 2022-10-16 | Stop reason: HOSPADM

## 2022-10-16 RX ORDER — LIDOCAINE HYDROCHLORIDE 10 MG/ML
20 INJECTION, SOLUTION EPIDURAL; INFILTRATION; INTRACAUDAL; PERINEURAL ONCE
Status: COMPLETED | OUTPATIENT
Start: 2022-10-16 | End: 2022-10-16

## 2022-10-16 RX ORDER — OXYCODONE HYDROCHLORIDE 10 MG/1
10 TABLET ORAL EVERY 4 HOURS PRN
Status: DISCONTINUED | OUTPATIENT
Start: 2022-10-16 | End: 2022-10-16 | Stop reason: HOSPADM

## 2022-10-16 RX ORDER — MIRTAZAPINE 45 MG/1
45 TABLET, FILM COATED ORAL
COMMUNITY
End: 2022-10-16

## 2022-10-16 RX ORDER — CLONAZEPAM 2 MG/1
2 TABLET ORAL 2 TIMES DAILY
COMMUNITY
End: 2022-10-16

## 2022-10-16 RX ORDER — TRAZODONE HYDROCHLORIDE 100 MG/1
100 TABLET ORAL
COMMUNITY
End: 2022-10-16

## 2022-10-16 RX ORDER — OXYCODONE HYDROCHLORIDE 5 MG/1
5 TABLET ORAL EVERY 4 HOURS PRN
Status: DISCONTINUED | OUTPATIENT
Start: 2022-10-16 | End: 2022-10-16 | Stop reason: HOSPADM

## 2022-10-16 RX ORDER — ATORVASTATIN CALCIUM 80 MG/1
80 TABLET, FILM COATED ORAL DAILY
COMMUNITY
End: 2022-10-16

## 2022-10-16 RX ORDER — GABAPENTIN 800 MG/1
800 TABLET ORAL 3 TIMES DAILY
COMMUNITY
End: 2022-10-16

## 2022-10-16 RX ORDER — OMEPRAZOLE 20 MG/1
20 CAPSULE, DELAYED RELEASE ORAL DAILY
COMMUNITY
End: 2022-10-16

## 2022-10-16 RX ADMIN — IOHEXOL 100 ML: 350 INJECTION, SOLUTION INTRAVENOUS at 00:23

## 2022-10-16 RX ADMIN — HYDROMORPHONE HYDROCHLORIDE 0.5 MG: 1 INJECTION, SOLUTION INTRAMUSCULAR; INTRAVENOUS; SUBCUTANEOUS at 06:12

## 2022-10-16 RX ADMIN — ACETAMINOPHEN 650 MG: 325 TABLET, FILM COATED ORAL at 10:02

## 2022-10-16 RX ADMIN — LIDOCAINE HYDROCHLORIDE 20 ML: 10 INJECTION, SOLUTION EPIDURAL; INFILTRATION; INTRACAUDAL; PERINEURAL at 01:10

## 2022-10-16 RX ADMIN — ACETAMINOPHEN 650 MG: 325 TABLET, FILM COATED ORAL at 02:23

## 2022-10-16 RX ADMIN — TETANUS TOXOID, REDUCED DIPHTHERIA TOXOID AND ACELLULAR PERTUSSIS VACCINE, ADSORBED 0.5 ML: 5; 2.5; 8; 8; 2.5 SUSPENSION INTRAMUSCULAR at 02:23

## 2022-10-16 NOTE — ASSESSMENT & PLAN NOTE
- Found down at bottom of stairs with blood   - GCS 14  - Intoxicated  - Labs, coma panel  - CTH negative  - CT C-spine Negative: Small laceration on right posterior head stapled  - CT Chest/Abdomen/Pelvis Negative  - Ambulate and discharge to home  - F/U outpatient clinic for staple removal and C-Spine clearence

## 2022-10-16 NOTE — DISCHARGE INSTRUCTIONS
Wound Care: You sustained a small laceration to the back of your right head  You may shower with shampoo and water  Pat dry  Return to clinic in 2 weeks for staple removal   Call the office with any increased bleeding, signs of infection, drainage, fevers, chills, swelling, redness    C-Collar: you had cervicale spine pain with palpation  Please continue to wear the C-Collar until return to clinic you may shower with it and change to another that is dry  You may take Tylenol 650mg every 6 hours as needed for pain  OR Ibuprofen 400mg every 8 hours with food for pain

## 2022-10-16 NOTE — DISCHARGE SUMMARY
[address] [facility]  Discharge- Marisa Philippe 1970, 54 y o  female MRN: [id number]  Unit/Bed#: ED 17 Encounter: [id number]  Primary Care Provider: No primary care provider on file  Date and time admitted to hospital: 10/15/2022 11:47 PM    Fall down stairs  Assessment & Plan  - Found down at bottom of stairs with blood   - GCS 14  - Intoxicated  - Labs, coma panel  - CTH negative  - CT C-spine Negative: Small laceration on right posterior head stapled  - CT Chest/Abdomen/Pelvis Negative  - Ambulate and discharge to home  - F/U outpatient clinic for staple removal and C-Spine clearence              Medical Problems             Resolved Problems  Date Reviewed: 10/16/2022   None                 Admission Date:     Admitting Diagnosis: Unspecified multiple injuries, initial encounter [T07  XXXA]    HPI: Patient is a 54year old female who presented as a level B trauma after having been found at the bottom of the stairs  She was ETOH positive  It was an unwitnessed fall  There was unknown LOC  The patient does not take blood thinners  She was evaluated by the trauma team in ED found to have no injuries  CTH, CT C-spine, CT Chest/Abd/Pelvis all negative  She had a small 1 inch laceration to the back of her head on the right which was washed out and stapled  Procedures Performed:   Orders Placed This Encounter   Procedures   • Fast Ultrasound       Summary of Hospital Course: Once the patient was sober she complained of pain in the right hip, right jaw  No fractures of bruising or edema was seen on the hip, her jaw had no bruising or crepitus with movement  Attempts to clear her C-spine were made but she continued with c-spine tenderness and her collar was left on   She will follow up in trauma clinic in 2 weeks for C-spine rigo and staple removal     Significant Findings, Care, Treatment and Services Provided: as above    Complications: None    Condition at Discharge: good Discharge instructions/Information to patient and family:   See after visit summary for information provided to patient and family  Provisions for Follow-Up Care:  See after visit summary for information related to follow-up care and any pertinent home health orders  PCP: No primary care provider on file  Disposition: Home    Planned Readmission: No    Discharge Statement   I spent 30 minutes discharging the patient  This time was spent on the day of discharge  I had direct contact with the patient on the day of discharge  Additional documentation is required if more than 30 minutes were spent on discharge  Discharge Medications:  See after visit summary for reconciled discharge medications provided to patient and family

## 2022-10-16 NOTE — PROCEDURES
POC FAST US    Date/Time: 10/16/2022 12:03 AM  Performed by: Dawna Heath MD  Authorized by: Dawna Heath MD     Patient location:  Trauma  Procedure details:     Exam Type:  Diagnostic    Indications: blunt abdominal trauma and blunt chest trauma      Assess for:  Intra-abdominal fluid and pericardial effusion    Technique: FAST      Views obtained:  Heart - Pericardial sac, RUQ - Kingsley's Pouch, Suprapubic - Pouch of Arnol and LUQ - Splenorenal space    Image quality: diagnostic      Image availability:  Images available in PACS and video obtained  FAST Findings:     RUQ (Hepatorenal) free fluid: absent      LUQ (Splenorenal) free fluid: absent      Suprapubic free fluid: absent      Cardiac wall motion: identified      Pericardial effusion: absent    Interpretation:     Impressions: negative        Chilango Olivo MD

## 2022-10-16 NOTE — H&P
[address] [facility]  H&P- aPri Cullen 1970, 54 y o  female MRN: [id number]  Unit/Bed#: TR 02 Encounter: [id number]  Primary Care Provider: No primary care provider on file  Date and time admitted to hospital: 10/15/2022 11:47 PM    Fall down stairs  Assessment & Plan  - Found down at bottom of stairs with blood   - GCS 14  - Intoxicated  - Labs, coma panel      Trauma Alert: Level B   Model of Arrival: Ambulance    Trauma Team: Attending Te Ledesma, Residents Leisa Chapa and Fellow Ishan Laws  Consultants:     None     History of Present Illness     Chief Complaint: intoxicated  Mechanism:Fall     HPI:    Pari Cullen is a 54 y o  female w PMHx of asthma who presents as a Level B trauma via EMS s/p presumed fall down stairs while intoxicated in her home  Unwitnessed, patient was found down altered with blood noted along stairs and on patients head  Unknown LOC  Not on blood thinners per chart review and discussion with patient's son  Patient is able to answer short questions appropriately, GCS 14, but groans non-specifically and persistently closes eyes during conversation  VSS in bay  FAST negative  Pan scan pending  Labs including coma panel sent  ISTAT unremarkable  Review of Systems   Unable to perform ROS: Other   Acutely intoxicated  12-point, complete review of systems was reviewed and negative except as stated above  Historical Information     No past medical history on file  No past surgical history on file  Unable to obtain history due to Acute intoxication         There is no immunization history on file for this patient  Last Tetanus: unknown  Family History: Non-contributory     Meds/Allergies   current meds:   No current facility-administered medications for this encounter  Allergies have not been reviewed;   Not on File    Objective   Initial Vitals:   Temperature: 98 1 °F (36 7 °C) (10/15/22 2350)  Pulse: 72 (10/15/22 2350)  Respirations: 18 (10/15/22 2350)  Blood Pressure: 125/76 (10/15/22 2350)    Primary Survey:   Airway:        Status: patent;        Pre-hospital Interventions: none        Hospital Interventions: none  Breathing:        Pre-hospital Interventions: none       Effort: normal       Right breath sounds: normal       Left breath sounds: normal  Circulation:        Rhythm: regular       Rate: regular   Right Pulses Left Pulses    R radial: 2+    R pedal: 2+     L radial: 2+    L pedal: 2+       Disability:        GCS: Eye: 4; Verbal: 4 Motor: 6 Total: 14       Right Pupil: 4 mm;  round;  reactive         Left Pupil:  4 mm;  round;  reactive      R Motor Strength L Motor Strength    R : 5/5  R dorsiflex: 5/5  R plantarflex: 5/5 L : 5/5  L dorsiflex: 5/5  L plantarflex: 5/5          Exposure:       Completed: No      Secondary Survey:  Physical Exam  Vitals reviewed  Constitutional:       Appearance: She is toxic-appearing  HENT:      Head:      Comments: Dried blood on posterior scalp     Right Ear: Tympanic membrane, ear canal and external ear normal       Left Ear: Tympanic membrane, ear canal and external ear normal       Nose: Nose normal       Mouth/Throat:      Mouth: Mucous membranes are moist    Eyes:      General: No scleral icterus  Right eye: No discharge  Left eye: No discharge  Extraocular Movements: Extraocular movements intact  Conjunctiva/sclera: Conjunctivae normal       Pupils: Pupils are equal, round, and reactive to light  Cardiovascular:      Rate and Rhythm: Normal rate  Pulses: Normal pulses  Pulmonary:      Effort: Pulmonary effort is normal  No respiratory distress  Abdominal:      General: There is no distension  Palpations: Abdomen is soft  Tenderness: There is no abdominal tenderness  Genitourinary:     General: Normal vulva  Musculoskeletal:         General: No swelling or deformity  Normal range of motion  Skin:     General: Skin is warm and dry  Capillary Refill: Capillary refill takes less than 2 seconds  Neurological:      Mental Status: She is disoriented  Invasive Devices  Report    Peripheral Intravenous Line  Duration           Peripheral IV 10/15/22 Left Antecubital <1 day    Peripheral IV 10/15/22 Left Hand <1 day              Lab Results:   Results: I have personally reviewed all pertinent laboratory/tests results, BMP/CMP:   Lab Results   Component Value Date    CO2 22 10/15/2022    GLUCOSE 97 10/15/2022   , CBC:   Lab Results   Component Value Date    HGB 12 9 10/15/2022    HCT 38 10/15/2022   , EtOH: No results found for: ETOH and ISTAT: No components found for: VBG    Imaging Results: I have personally reviewed pertinent reports  Chest Xray(s): negative for acute findings   FAST exam(s): negative for acute findings   CT Scan(s): pending   Additional Xray(s): N/A     Other Studies: NA    Code Status: No Order  Advance Directive and Living Will:      Power of :    POLST:    I have spent 30 minutes with Patient and family today in which greater than 50% of this time was spent in counseling/coordination of care regarding Diagnostic results, Prognosis, Risks and benefits of tx options, Intructions for management, Patient and family education, Importance of tx compliance, Risk factor reductions and Impressions      Robet Jeans, MD

## 2022-10-16 NOTE — PROGRESS NOTES
Dahlia Caballero arrived as a Trauma level B at [phone] after what family told paramedics was a fall down the stairs  She was confused during examination, but  visited with her son Cheri Clark who was in family waiting room, but not on scene for incident and said he knows no details about what happened  No other family will arrive  Father is at home with his brother and sister, who all need to stay home, as Cheri Clark reports they have been drinking  Cheri Clark provided calm presence and hospitality

## 2022-10-16 NOTE — ED PROVIDER NOTES
Emergency Department Airway Evaluation and Management Form    History  Obtained from: ems  Patient has no allergy information on record  Chief Complaint   Patient presents with   • Fall     Pt etoh, per family fell down 15 steps  Found at bottom of steps unconscious  HPI   52yo female unwitnessed fall down stairs, found face down, +EtOH  No past medical history on file  No past surgical history on file  No family history on file  I have reviewed and agree with the history as documented      Review of Systems    Physical Exam  BP 94/56   Pulse 86   Temp 98 1 °F (36 7 °C) (Tympanic)   Resp (!) 23   Wt 86 5 kg (190 lb 11 2 oz)   SpO2 96%     Physical Exam   Airway intact, breath sounds equal, palpable pulses, heart reg, GCS 14    ED Medications  Medications   acetaminophen (TYLENOL) tablet 650 mg (650 mg Oral Given 10/16/22 0223)   oxyCODONE (ROXICODONE) IR tablet 5 mg (has no administration in time range)   HYDROmorphone (DILAUDID) injection 0 5 mg (has no administration in time range)   oxyCODONE (ROXICODONE) immediate release tablet 10 mg (has no administration in time range)   naloxone (NARCAN) 0 04 mg/mL syringe 0 04 mg (has no administration in time range)   iohexol (OMNIPAQUE) 350 MG/ML injection (SINGLE-DOSE) 100 mL (100 mL Intravenous Given 10/16/22 0023)   tetanus-diphtheria-acellular pertussis (BOOSTRIX) IM injection 0 5 mL (0 5 mL Intramuscular Given 10/16/22 0223)   lidocaine (PF) (XYLOCAINE-MPF) 1 % injection 20 mL (20 mL Infiltration Given by Other 10/16/22 0110)       Intubation  Procedures    Notes      Final Diagnosis  Final diagnoses:   Fall down steps, initial encounter       ED Provider  Electronically Signed by     Mikael Henderson, DO  10/16/22 3445 [facility], DO  10/16/22 7764

## 2022-10-20 ENCOUNTER — OFFICE VISIT (OUTPATIENT)
Dept: INTERNAL MEDICINE CLINIC | Facility: CLINIC | Age: 52
End: 2022-10-20
Payer: COMMERCIAL

## 2022-10-20 VITALS
TEMPERATURE: 97.4 F | DIASTOLIC BLOOD PRESSURE: 78 MMHG | WEIGHT: 183 LBS | OXYGEN SATURATION: 99 % | HEIGHT: 65 IN | SYSTOLIC BLOOD PRESSURE: 104 MMHG | BODY MASS INDEX: 30.49 KG/M2 | HEART RATE: 96 BPM

## 2022-10-20 DIAGNOSIS — H93.13 TINNITUS OF BOTH EARS: ICD-10-CM

## 2022-10-20 DIAGNOSIS — G43.909 MIGRAINE WITHOUT STATUS MIGRAINOSUS, NOT INTRACTABLE, UNSPECIFIED MIGRAINE TYPE: ICD-10-CM

## 2022-10-20 DIAGNOSIS — S20.222D CONTUSION OF LEFT SIDE OF BACK, SUBSEQUENT ENCOUNTER: ICD-10-CM

## 2022-10-20 DIAGNOSIS — S09.90XD TRAUMATIC INJURY OF HEAD, SUBSEQUENT ENCOUNTER: Primary | ICD-10-CM

## 2022-10-20 DIAGNOSIS — Z78.9 ALCOHOL USE: ICD-10-CM

## 2022-10-20 DIAGNOSIS — K90.9 INTESTINAL MALABSORPTION, UNSPECIFIED TYPE: ICD-10-CM

## 2022-10-20 PROCEDURE — 99214 OFFICE O/P EST MOD 30 MIN: CPT | Performed by: INTERNAL MEDICINE

## 2022-10-20 RX ORDER — ERGOCALCIFEROL 1.25 MG/1
100000 CAPSULE ORAL WEEKLY
Qty: 8 CAPSULE | Refills: 5 | Status: SHIPPED | OUTPATIENT
Start: 2022-10-20 | End: 2022-10-26 | Stop reason: SDUPTHER

## 2022-10-20 NOTE — PROGRESS NOTES
Assessment/Plan:           1  Traumatic injury of head, subsequent encounter  Comments:  Staples in place  Will be removed next week  2  Contusion of left side of back, subsequent encounter  Comments:  Warm compresses locally and Tylenol recommended  3  Intestinal malabsorption, unspecified type  -     ergocalciferol (VITAMIN D2) 50,000 units; Take 2 capsules (100,000 Units total) by mouth once a week    4  Migraine without status migrainosus, not intractable, unspecified migraine type    5  Tinnitus of both ears  Comments:  Referred to ENT  Orders:  -     Ambulatory Referral to Otolaryngology; Future    6  Alcohol use  Comments:  Blood alcohol level was 171  She admitted to drinking 2 [place] when she went out for dinner  1  Intestinal malabsorption, unspecified type    - ergocalciferol (VITAMIN D2) 50,000 units; Take 2 capsules (100,000 Units total) by mouth once a week  Dispense: 8 capsule; Refill: 5    2  Migraine without status migrainosus, not intractable, unspecified migraine type      3  Tinnitus of both ears         No problem-specific Assessment & Plan notes found for this encounter  Subjective:      Patient ID: Steven Weber is a 54 y o  female  HPI    The following portions of the patient's history were reviewed and updated as appropriate: She  has a past medical history of Asthma, Colon polyp, Gastroduodenitis, Intestinal malabsorption, Migraine, Pyonephrosis, UTI (urinary tract infection), and Vitamin D deficiency    She   Patient Active Problem List    Diagnosis Date Noted   • Fall down stairs 10/16/2022   • Back pain 10/26/2021   • Headache 10/26/2021   • Cervical strain, acute 10/26/2021   • Iron deficiency anemia 05/04/2021   • Migraine without status migrainosus, not intractable 05/04/2021   • Postoperative malabsorption 05/04/2021   • Intestinal malabsorption    • Asthma    • Vitamin D deficiency      She  has a past surgical history that includes Laparoscopic gastric banding (2005); Gastric bypass (06/2008); [place] tooth extraction; Dilation and curettage of uterus (2017); Abdominal surgery; Tubal ligation; CT cystogram (5/23/2017); and CT cystogram (5/23/2017)  Her family history includes Breast cancer in her family; Colon cancer in her family; Diabetes in her family; Gallbladder disease in her family; Heart disease in her family; Hypertension in her family; Kidney cancer in her family; Lung cancer in her family; Ovarian cancer in her family; Prostate cancer in her family; Stomach cancer in her family; Thyroid disease in her family and mother  She  reports that she has quit smoking  She has never used smokeless tobacco  She reports current alcohol use  She reports that she does not use drugs  Current Outpatient Medications   Medication Sig Dispense Refill   • BIOTIN PO Take by mouth     • Butalbital-APAP-Caffeine (Fioricet) -40 MG CAPS Take 1 tablet by mouth daily as needed (migraine) 15 capsule 0   • CALCIUM CITRATE PO Take by mouth     • Diclofenac Sodium (VOLTAREN) 1 % Apply 2 g topically 4 (four) times a day 100 g 1   • ergocalciferol (VITAMIN D2) 50,000 units Take 2 capsules (100,000 Units total) by mouth once a week 8 capsule 5   • ferrous sulfate 324 (65 Fe) mg TAKE 1 TABLET (324 MG TOTAL) BY MOUTH 2 (TWO) TIMES A DAY BEFORE MEALS 60 tablet 0   • FOLIC ACID PO Take by mouth daily       • Multiple Vitamin (Daily-Isra Multivitamin) TABS TAKE 1 TABLET BY MOUTH EVERY DAY 90 tablet 3   • vitamin B-12 (VITAMIN B-12) 1,000 mcg tablet Take by mouth daily     • VITAMIN E PO Take by mouth     • Vyvanse 60 MG capsule Take 60 mg by mouth daily     • cloNIDine (CATAPRES) 0 1 mg tablet Take 0 1 mg by mouth daily Taking one daily  (Patient not taking: No sig reported)       No current facility-administered medications for this visit       Current Outpatient Medications on File Prior to Visit   Medication Sig   • BIOTIN PO Take by mouth   • Butalbital-APAP-Caffeine (Fioricet) -40 MG CAPS Take 1 tablet by mouth daily as needed (migraine)   • CALCIUM CITRATE PO Take by mouth   • Diclofenac Sodium (VOLTAREN) 1 % Apply 2 g topically 4 (four) times a day   • ferrous sulfate 324 (65 Fe) mg TAKE 1 TABLET (324 MG TOTAL) BY MOUTH 2 (TWO) TIMES A DAY BEFORE MEALS   • FOLIC ACID PO Take by mouth daily     • Multiple Vitamin (Daily-Isra Multivitamin) TABS TAKE 1 TABLET BY MOUTH EVERY DAY   • vitamin B-12 (VITAMIN B-12) 1,000 mcg tablet Take by mouth daily   • VITAMIN E PO Take by mouth   • Vyvanse 60 MG capsule Take 60 mg by mouth daily   • [DISCONTINUED] ergocalciferol (VITAMIN D2) 50,000 units 2 CAPSULES ONCE A WEEK   • cloNIDine (CATAPRES) 0 1 mg tablet Take 0 1 mg by mouth daily Taking one daily  (Patient not taking: No sig reported)   • [DISCONTINUED] atorvastatin (LIPITOR) 80 mg tablet Take 80 mg by mouth daily   • [DISCONTINUED] clonazePAM (KlonoPIN) 2 mg tablet Take 2 mg by mouth 2 (two) times a day   • [DISCONTINUED] gabapentin (NEURONTIN) 800 mg tablet Take 800 mg by mouth 3 (three) times a day   • [DISCONTINUED] mirtazapine (REMERON) 45 MG tablet Take 45 mg by mouth daily at bedtime   • [DISCONTINUED] omeprazole (PriLOSEC) 20 mg delayed release capsule Take 20 mg by mouth daily   • [DISCONTINUED] traZODone (DESYREL) 100 mg tablet Take 100 mg by mouth daily at bedtime     No current facility-administered medications on file prior to visit  She has No Known Allergies       Review of Systems   Constitutional: Negative for appetite change, chills, fatigue and fever  HENT: Positive for tinnitus  Negative for sore throat and trouble swallowing  Eyes: Negative for redness  Respiratory: Negative for shortness of breath  Cardiovascular: Negative for chest pain and palpitations  Gastrointestinal: Negative for abdominal pain, constipation and diarrhea  Genitourinary: Negative for dysuria and hematuria  Musculoskeletal: Positive for neck pain  Negative for back pain     Skin: Negative for rash  Neurological: Positive for headaches  Negative for seizures and weakness  Hematological: Negative for adenopathy  Psychiatric/Behavioral: Negative for confusion  The patient is not nervous/anxious            Objective:      /78 (BP Location: Left arm, Patient Position: Sitting, Cuff Size: Large)   Pulse 96   Temp (!) 97 4 °F (36 3 °C) (Temporal)   Ht 5' 5" (1 651 m)   Wt 83 kg (183 lb)   SpO2 99% Comment: RA  BMI 30 45 kg/m²     Results Reviewed     None          Recent Results (from the past 1344 hour(s))   TIBC    Collection Time: 09/29/22  8:25 AM   Result Value Ref Range    Iron, Serum 49 40 - 190 mcg/dL    Total Iron Binding Capacity (TIBC) 342 250 - 450 mcg/dL (calc)    Iron Saturation 14 (L) 16 - 45 % (calc)   Comprehensive metabolic panel    Collection Time: 09/29/22  8:25 AM   Result Value Ref Range    Glucose, Random 98 65 - 99 mg/dL    BUN 11 7 - 25 mg/dL    Creatinine 0 69 0 50 - 0 99 mg/dL    eGFR 106 > OR = 60 mL/min/1 73m2    SL AMB BUN/CREATININE RATIO NOT APPLICABLE 6 - 22 (calc)    Sodium 140 135 - 146 mmol/L    Potassium 4 4 3 5 - 5 3 mmol/L    Chloride 106 98 - 110 mmol/L    CO2 26 20 - 32 mmol/L    Calcium 9 4 8 6 - 10 2 mg/dL    Protein, Total 6 8 6 1 - 8 1 g/dL    Albumin 4 0 3 6 - 5 1 g/dL    Globulin 2 8 1 9 - 3 7 g/dL (calc)    Albumin/Globulin Ratio 1 4 1 0 - 2 5 (calc)    TOTAL BILIRUBIN 0 4 0 2 - 1 2 mg/dL    Alkaline Phosphatase 86 31 - 125 U/L    AST 21 10 - 35 U/L    ALT 16 6 - 29 U/L   Vitamin A    Collection Time: 09/29/22  8:25 AM   Result Value Ref Range    Vitamin A 46 38 - 98 mcg/dL   Zinc    Collection Time: 09/29/22  8:25 AM   Result Value Ref Range    Zinc 68 60 - 130 mcg/dL   Vitamin B1, whole blood    Collection Time: 09/29/22  8:25 AM   Result Value Ref Range    Vitamin B1, Whole Blood 124 78 - 185 nmol/L   Urinalysis with microscopic    Collection Time: 09/29/22  8:25 AM   Result Value Ref Range    Color UA YELLOW YELLOW    Urine Appearance CLOUDY (A) CLEAR    Specific Gravity 1 024 1 001 - 1 035    Ph 6 0 5 0 - 8 0    Glucose, Urine NEGATIVE NEGATIVE    Bilirubin, Urine NEGATIVE NEGATIVE    Ketone, Urine NEGATIVE NEGATIVE    Blood, Urine TRACE (A) NEGATIVE    Protein, Urine NEGATIVE NEGATIVE    SL AMB NITRITES URINE, QUAL   NEGATIVE NEGATIVE    Leukocyte Esterase NEGATIVE NEGATIVE    SL AMB WBC, URINE 0-5 < OR = 5 /HPF    RBC, Urine 3-10 (A) < OR = 2 /HPF    Squamous Epithelial Cells 20-40 (A) < OR = 5 /HPF    Bacteria, UA FEW (A) NONE SEEN /HPF    Hyaline Casts NONE SEEN NONE SEEN /LPF    Comment(s)     CBC and differential    Collection Time: 09/29/22  8:25 AM   Result Value Ref Range    White Blood Cell Count 4 7 3 8 - 10 8 Thousand/uL    Red Blood Cell Count 4 14 3 80 - 5 10 Million/uL    Hemoglobin 12 3 11 7 - 15 5 g/dL    HCT 38 3 35 0 - 45 0 %    MCV 92 5 80 0 - 100 0 fL    MCH 29 7 27 0 - 33 0 pg    MCHC 32 1 32 0 - 36 0 g/dL    RDW 11 9 11 0 - 15 0 %    Platelet Count 305 394 - 400 Thousand/uL    SL AMB MPV 12 0 7 5 - 12 5 fL    Neutrophils (Absolute) 2,383 1,500 - 7,800 cells/uL    Lymphocytes (Absolute) 1,781 850 - 3,900 cells/uL    Monocytes (Absolute) 334 200 - 950 cells/uL    Eosinophils (Absolute) 141 15 - 500 cells/uL    Basophils ABS 61 0 - 200 cells/uL    Neutrophils 50 7 %    Lymphocytes 37 9 %    Monocytes 7 1 %    Eosinophils 3 0 %    Basophils PCT 1 3 %   Vitamin B12    Collection Time: 09/29/22  8:25 AM   Result Value Ref Range    Vitamin B-12 1,116 (H) 200 - 1,100 pg/mL   Vitamin D 25 hydroxy    Collection Time: 09/29/22  8:25 AM   Result Value Ref Range    Vitamin D, 25-Hydroxy, Serum 68 30 - 100 ng/mL   POCT Blood Gas (CG8+)    Collection Time: 10/15/22 11:57 PM   Result Value Ref Range    ph, Willie ISTAT 7 379 7 300 - 7 400    pCO2, Willie i-STAT 36 2 (L) 42 0 - 50 0 mm HG    pO2, Willie i-STAT 39 0 35 0 - 45 0 mm HG    BE, i-STAT -3 (L) -2 - 3 mmol/L    HCO3, Willie i-STAT 21 4 (L) 24 0 - 30 0 mmol/L    CO2, i-STAT 22 21 - 32 mmol/L    O2 Sat, i-STAT 72 60 - 85 %    SODIUM, I-STAT 140 136 - 145 mmol/l    Potassium, i-STAT 3 9 3 5 - 5 3 mmol/L    Calcium, Ionized i-STAT 1 05 (L) 1 12 - 1 32 mmol/L    Hct, i-STAT 38 34 8 - 46 1 %    Hgb, i-STAT 12 9 11 5 - 15 4 g/dl    Glucose, i-STAT 97 65 - 140 mg/dl    Specimen Type VENOUS    Type and screen    Collection Time: 10/16/22 12:46 AM   Result Value Ref Range    ABO Grouping O     Rh Factor Positive     Antibody Screen Negative     Specimen Expiration Date 20221019    CBC and differential    Collection Time: 10/16/22 12:46 AM   Result Value Ref Range    WBC 6 84 4 31 - 10 16 Thousand/uL    RBC 4 29 3 81 - 5 12 Million/uL    Hemoglobin 12 5 11 5 - 15 4 g/dL    Hematocrit 39 3 34 8 - 46 1 %    MCV 92 82 - 98 fL    MCH 29 1 26 8 - 34 3 pg    MCHC 31 8 31 4 - 37 4 g/dL    RDW 12 8 11 6 - 15 1 %    MPV 11 0 8 9 - 12 7 fL    Platelets 122 319 - 269 Thousands/uL    nRBC 0 /100 WBCs    Neutrophils Relative 63 43 - 75 %    Immat GRANS % 0 0 - 2 %    Lymphocytes Relative 29 14 - 44 %    Monocytes Relative 6 4 - 12 %    Eosinophils Relative 1 0 - 6 %    Basophils Relative 1 0 - 1 %    Neutrophils Absolute 4 28 1 85 - 7 62 Thousands/µL    Immature Grans Absolute 0 02 0 00 - 0 20 Thousand/uL    Lymphocytes Absolute 1 98 0 60 - 4 47 Thousands/µL    Monocytes Absolute 0 42 0 17 - 1 22 Thousand/µL    Eosinophils Absolute 0 09 0 00 - 0 61 Thousand/µL    Basophils Absolute 0 05 0 00 - 0 10 Thousands/µL   Basic metabolic panel    Collection Time: 10/16/22 12:46 AM   Result Value Ref Range    Sodium 140 135 - 147 mmol/L    Potassium 3 9 3 5 - 5 3 mmol/L    Chloride 107 96 - 108 mmol/L    CO2 24 21 - 32 mmol/L    ANION GAP 9 4 - 13 mmol/L    BUN 9 5 - 25 mg/dL    Creatinine 0 71 0 60 - 1 30 mg/dL    Glucose 97 65 - 140 mg/dL    Calcium 8 5 8 3 - 10 1 mg/dL    eGFR 100 ml/min/1 73sq m   Ethanol    Collection Time: 10/16/22 12:46 AM   Result Value Ref Range    Ethanol Lvl 171 (H) 0 - 3 mg/dL   Salicylate level Collection Time: 10/16/22 12:46 AM   Result Value Ref Range    Salicylate Lvl <3 (L) 3 - 20 mg/dL   Acetaminophen level-If concentration is detectable, please discuss with medical  on call  Collection Time: 10/16/22 12:46 AM   Result Value Ref Range    Acetaminophen Level <2 (L) 10 - 20 ug/mL        Physical Exam  Constitutional:       General: She is not in acute distress  Appearance: Normal appearance  She is obese  HENT:      Head: Normocephalic  Comments: Ingrid present on occipital area  Nose: Nose normal       Mouth/Throat:      Mouth: Mucous membranes are moist    Eyes:      Extraocular Movements: Extraocular movements intact  Pupils: Pupils are equal, round, and reactive to light  Cardiovascular:      Rate and Rhythm: Normal rate and regular rhythm  Pulses: Normal pulses  Heart sounds: Normal heart sounds  No murmur heard  No friction rub  Pulmonary:      Effort: Pulmonary effort is normal  No respiratory distress  Breath sounds: Normal breath sounds  No wheezing  Abdominal:      General: Abdomen is flat  Bowel sounds are normal  There is no distension  Palpations: Abdomen is soft  There is no mass  Tenderness: There is no abdominal tenderness  There is no guarding  Musculoskeletal:         General: Tenderness present  Normal range of motion  Cervical back: Normal range of motion  Comments: Tenderness right and left upper gluteal area   Skin:     Findings: Bruising present  Comments: Left gluteal area bruising  Neurological:      General: No focal deficit present  Mental Status: She is alert and oriented to person, place, and time  Mental status is at baseline  Cranial Nerves: No cranial nerve deficit     Psychiatric:         Mood and Affect: Mood normal          Behavior: Behavior normal

## 2022-10-24 ENCOUNTER — TELEPHONE (OUTPATIENT)
Dept: INTERNAL MEDICINE CLINIC | Facility: CLINIC | Age: 52
End: 2022-10-24

## 2022-10-24 NOTE — TELEPHONE ENCOUNTER
She had fall last week and has staples in her head, she says she has headache  There is some fluid coming out and she is not sure if there is infection, she says she feels confused not sure if its due to infection or if it from the headache  She made appt tomorrow so you can look at it, she cannot drive by herself and her  just left the house

## 2022-10-26 ENCOUNTER — OFFICE VISIT (OUTPATIENT)
Dept: INTERNAL MEDICINE CLINIC | Facility: CLINIC | Age: 52
End: 2022-10-26
Payer: COMMERCIAL

## 2022-10-26 ENCOUNTER — TELEPHONE (OUTPATIENT)
Dept: INTERNAL MEDICINE CLINIC | Facility: CLINIC | Age: 52
End: 2022-10-26

## 2022-10-26 VITALS
HEART RATE: 82 BPM | BODY MASS INDEX: 31.19 KG/M2 | TEMPERATURE: 98.3 F | SYSTOLIC BLOOD PRESSURE: 122 MMHG | OXYGEN SATURATION: 98 % | WEIGHT: 187.2 LBS | HEIGHT: 65 IN | DIASTOLIC BLOOD PRESSURE: 80 MMHG

## 2022-10-26 DIAGNOSIS — S09.90XD TRAUMATIC INJURY OF HEAD, SUBSEQUENT ENCOUNTER: ICD-10-CM

## 2022-10-26 DIAGNOSIS — S06.0X1D CONCUSSION WITH LOSS OF CONSCIOUSNESS OF 30 MINUTES OR LESS, SUBSEQUENT ENCOUNTER: ICD-10-CM

## 2022-10-26 DIAGNOSIS — G43.909 MIGRAINE WITHOUT STATUS MIGRAINOSUS, NOT INTRACTABLE, UNSPECIFIED MIGRAINE TYPE: ICD-10-CM

## 2022-10-26 DIAGNOSIS — K90.9 INTESTINAL MALABSORPTION, UNSPECIFIED TYPE: ICD-10-CM

## 2022-10-26 DIAGNOSIS — L03.811 CELLULITIS OF SCALP: Primary | ICD-10-CM

## 2022-10-26 PROBLEM — S09.90XA HEAD TRAUMA: Status: ACTIVE | Noted: 2022-10-26

## 2022-10-26 PROBLEM — S06.0X1A CONCUSSION WTH LOSS OF CONSCIOUSNESS OF 30 MINUTES OR LESS: Status: ACTIVE | Noted: 2022-10-26

## 2022-10-26 PROCEDURE — 99214 OFFICE O/P EST MOD 30 MIN: CPT | Performed by: INTERNAL MEDICINE

## 2022-10-26 RX ORDER — CEPHALEXIN 500 MG/1
500 CAPSULE ORAL EVERY 6 HOURS SCHEDULED
Qty: 40 CAPSULE | Refills: 0 | Status: SHIPPED | OUTPATIENT
Start: 2022-10-26 | End: 2022-11-05

## 2022-10-26 RX ORDER — ERGOCALCIFEROL 1.25 MG/1
100000 CAPSULE ORAL WEEKLY
Qty: 8 CAPSULE | Refills: 5 | Status: SHIPPED | OUTPATIENT
Start: 2022-10-26

## 2022-10-26 NOTE — PROGRESS NOTES
Assessment/Plan:  Will start her on Keflex antibiotic, she has appointment with the trauma surgeon tomorrow, advised her to keep the appointment see the surgeon  1  Cellulitis of scalp  -     cephalexin (KEFLEX) 500 mg capsule; Take 1 capsule (500 mg total) by mouth every 6 (six) hours for 10 days    2  Traumatic injury of head, subsequent encounter  -     Ambulatory referral to Occupational Therapy; Future    3  Concussion with loss of consciousness of 30 minutes or less, subsequent encounter  -     Ambulatory referral to Occupational Therapy; Future    4  Migraine without status migrainosus, not intractable, unspecified migraine type    5  Intestinal malabsorption, unspecified type  -     ergocalciferol (VITAMIN D2) 50,000 units; Take 2 capsules (100,000 Units total) by mouth once a week         Subjective:      Patient ID: Ibrahima Mary is a 50 y o  female  Headache, had a fall recently, as per her see feels he is forgetting things      The following portions of the patient's history were reviewed and updated as appropriate: She  has a past medical history of Asthma, Colon polyp, Gastroduodenitis, Intestinal malabsorption, Migraine, Pyonephrosis, UTI (urinary tract infection), and Vitamin D deficiency  She   Patient Active Problem List    Diagnosis Date Noted   • Concussion wth loss of consciousness of 30 minutes or less 10/26/2022   • Head trauma 10/26/2022   • Cellulitis of scalp 10/26/2022   • Fall down stairs 10/16/2022   • Back pain 10/26/2021   • Headache 10/26/2021   • Cervical strain, acute 10/26/2021   • Iron deficiency anemia 05/04/2021   • Migraine without status migrainosus, not intractable 05/04/2021   • Postoperative malabsorption 05/04/2021   • Intestinal malabsorption    • Asthma    • Vitamin D deficiency      She  has a past surgical history that includes Laparoscopic gastric banding (2005); Gastric bypass (06/2008);  [place] tooth extraction; Dilation and curettage of uterus (2017); Abdominal surgery; Tubal ligation; CT cystogram (5/23/2017); and CT cystogram (5/23/2017)  Her family history includes Breast cancer in her family; Colon cancer in her family; Diabetes in her family; Gallbladder disease in her family; Heart disease in her family; Hypertension in her family; Kidney cancer in her family; Lung cancer in her family; Ovarian cancer in her family; Prostate cancer in her family; Stomach cancer in her family; Thyroid disease in her family and mother  She  reports that she has quit smoking  She has never used smokeless tobacco  She reports current alcohol use  She reports that she does not use drugs  Current Outpatient Medications   Medication Sig Dispense Refill   • BIOTIN PO Take by mouth     • Butalbital-APAP-Caffeine (Fioricet) -40 MG CAPS Take 1 tablet by mouth daily as needed (migraine) 15 capsule 0   • CALCIUM CITRATE PO Take by mouth     • cephalexin (KEFLEX) 500 mg capsule Take 1 capsule (500 mg total) by mouth every 6 (six) hours for 10 days 40 capsule 0   • Diclofenac Sodium (VOLTAREN) 1 % Apply 2 g topically 4 (four) times a day 100 g 1   • ergocalciferol (VITAMIN D2) 50,000 units Take 2 capsules (100,000 Units total) by mouth once a week 8 capsule 5   • ferrous sulfate 324 (65 Fe) mg TAKE 1 TABLET (324 MG TOTAL) BY MOUTH 2 (TWO) TIMES A DAY BEFORE MEALS 60 tablet 0   • FOLIC ACID PO Take by mouth daily       • Multiple Vitamin (Daily-Isra Multivitamin) TABS TAKE 1 TABLET BY MOUTH EVERY DAY 90 tablet 3   • vitamin B-12 (VITAMIN B-12) 1,000 mcg tablet Take by mouth daily     • VITAMIN E PO Take by mouth     • Vyvanse 60 MG capsule Take 60 mg by mouth daily     • cloNIDine (CATAPRES) 0 1 mg tablet Take 0 1 mg by mouth daily Taking one daily  (Patient not taking: No sig reported)       No current facility-administered medications for this visit       Current Outpatient Medications on File Prior to Visit   Medication Sig   • BIOTIN PO Take by mouth   • Butalbital-APAP-Caffeine (Fioricet) -40 MG CAPS Take 1 tablet by mouth daily as needed (migraine)   • CALCIUM CITRATE PO Take by mouth   • Diclofenac Sodium (VOLTAREN) 1 % Apply 2 g topically 4 (four) times a day   • ferrous sulfate 324 (65 Fe) mg TAKE 1 TABLET (324 MG TOTAL) BY MOUTH 2 (TWO) TIMES A DAY BEFORE MEALS   • FOLIC ACID PO Take by mouth daily     • Multiple Vitamin (Daily-Isra Multivitamin) TABS TAKE 1 TABLET BY MOUTH EVERY DAY   • vitamin B-12 (VITAMIN B-12) 1,000 mcg tablet Take by mouth daily   • VITAMIN E PO Take by mouth   • Vyvanse 60 MG capsule Take 60 mg by mouth daily   • [DISCONTINUED] ergocalciferol (VITAMIN D2) 50,000 units Take 2 capsules (100,000 Units total) by mouth once a week   • cloNIDine (CATAPRES) 0 1 mg tablet Take 0 1 mg by mouth daily Taking one daily  (Patient not taking: No sig reported)   • [DISCONTINUED] atorvastatin (LIPITOR) 80 mg tablet Take 80 mg by mouth daily   • [DISCONTINUED] clonazePAM (KlonoPIN) 2 mg tablet Take 2 mg by mouth 2 (two) times a day   • [DISCONTINUED] gabapentin (NEURONTIN) 800 mg tablet Take 800 mg by mouth 3 (three) times a day   • [DISCONTINUED] mirtazapine (REMERON) 45 MG tablet Take 45 mg by mouth daily at bedtime   • [DISCONTINUED] omeprazole (PriLOSEC) 20 mg delayed release capsule Take 20 mg by mouth daily   • [DISCONTINUED] traZODone (DESYREL) 100 mg tablet Take 100 mg by mouth daily at bedtime     No current facility-administered medications on file prior to visit  She has No Known Allergies       Review of Systems   Constitutional: Negative for chills and fever  HENT: Negative for congestion, ear pain and sore throat  Eyes: Negative for pain  Respiratory: Negative for cough and shortness of breath  Cardiovascular: Negative for chest pain and leg swelling  Gastrointestinal: Negative for abdominal pain, nausea and vomiting  Endocrine: Negative for polyuria     Genitourinary: Negative for difficulty urinating, frequency and urgency  Musculoskeletal: Positive for arthralgias and back pain  Skin: Negative for rash  Neurological: Positive for headaches  Negative for weakness  Psychiatric/Behavioral: Negative for sleep disturbance  The patient is not nervous/anxious            Objective:      /80 (BP Location: Left arm, Patient Position: Sitting)   Pulse 82   Temp 98 3 °F (36 8 °C)   Ht 5' 5" (1 651 m)   Wt 84 9 kg (187 lb 3 2 oz)   SpO2 98%   BMI 31 15 kg/m²     Recent Results (from the past 1344 hour(s))   TIBC    Collection Time: 09/29/22  8:25 AM   Result Value Ref Range    Iron, Serum 49 40 - 190 mcg/dL    Total Iron Binding Capacity (TIBC) 342 250 - 450 mcg/dL (calc)    Iron Saturation 14 (L) 16 - 45 % (calc)   Comprehensive metabolic panel    Collection Time: 09/29/22  8:25 AM   Result Value Ref Range    Glucose, Random 98 65 - 99 mg/dL    BUN 11 7 - 25 mg/dL    Creatinine 0 69 0 50 - 0 99 mg/dL    eGFR 106 > OR = 60 mL/min/1 73m2    SL AMB BUN/CREATININE RATIO NOT APPLICABLE 6 - 22 (calc)    Sodium 140 135 - 146 mmol/L    Potassium 4 4 3 5 - 5 3 mmol/L    Chloride 106 98 - 110 mmol/L    CO2 26 20 - 32 mmol/L    Calcium 9 4 8 6 - 10 2 mg/dL    Protein, Total 6 8 6 1 - 8 1 g/dL    Albumin 4 0 3 6 - 5 1 g/dL    Globulin 2 8 1 9 - 3 7 g/dL (calc)    Albumin/Globulin Ratio 1 4 1 0 - 2 5 (calc)    TOTAL BILIRUBIN 0 4 0 2 - 1 2 mg/dL    Alkaline Phosphatase 86 31 - 125 U/L    AST 21 10 - 35 U/L    ALT 16 6 - 29 U/L   Vitamin A    Collection Time: 09/29/22  8:25 AM   Result Value Ref Range    Vitamin A 46 38 - 98 mcg/dL   Zinc    Collection Time: 09/29/22  8:25 AM   Result Value Ref Range    Zinc 68 60 - 130 mcg/dL   Vitamin B1, whole blood    Collection Time: 09/29/22  8:25 AM   Result Value Ref Range    Vitamin B1, Whole Blood 124 78 - 185 nmol/L   Urinalysis with microscopic    Collection Time: 09/29/22  8:25 AM   Result Value Ref Range    Color UA YELLOW YELLOW    Urine Appearance CLOUDY (A) CLEAR    Specific Gravity 1 024 1 001 - 1 035    Ph 6 0 5 0 - 8 0    Glucose, Urine NEGATIVE NEGATIVE    Bilirubin, Urine NEGATIVE NEGATIVE    Ketone, Urine NEGATIVE NEGATIVE    Blood, Urine TRACE (A) NEGATIVE    Protein, Urine NEGATIVE NEGATIVE    SL AMB NITRITES URINE, QUAL   NEGATIVE NEGATIVE    Leukocyte Esterase NEGATIVE NEGATIVE    SL AMB WBC, URINE 0-5 < OR = 5 /HPF    RBC, Urine 3-10 (A) < OR = 2 /HPF    Squamous Epithelial Cells 20-40 (A) < OR = 5 /HPF    Bacteria, UA FEW (A) NONE SEEN /HPF    Hyaline Casts NONE SEEN NONE SEEN /LPF    Comment(s)     CBC and differential    Collection Time: 09/29/22  8:25 AM   Result Value Ref Range    White Blood Cell Count 4 7 3 8 - 10 8 Thousand/uL    Red Blood Cell Count 4 14 3 80 - 5 10 Million/uL    Hemoglobin 12 3 11 7 - 15 5 g/dL    HCT 38 3 35 0 - 45 0 %    MCV 92 5 80 0 - 100 0 fL    MCH 29 7 27 0 - 33 0 pg    MCHC 32 1 32 0 - 36 0 g/dL    RDW 11 9 11 0 - 15 0 %    Platelet Count 299 018 - 400 Thousand/uL    SL AMB MPV 12 0 7 5 - 12 5 fL    Neutrophils (Absolute) 2,383 1,500 - 7,800 cells/uL    Lymphocytes (Absolute) 1,781 850 - 3,900 cells/uL    Monocytes (Absolute) 334 200 - 950 cells/uL    Eosinophils (Absolute) 141 15 - 500 cells/uL    Basophils ABS 61 0 - 200 cells/uL    Neutrophils 50 7 %    Lymphocytes 37 9 %    Monocytes 7 1 %    Eosinophils 3 0 %    Basophils PCT 1 3 %   Vitamin B12    Collection Time: 09/29/22  8:25 AM   Result Value Ref Range    Vitamin B-12 1,116 (H) 200 - 1,100 pg/mL   Vitamin D 25 hydroxy    Collection Time: 09/29/22  8:25 AM   Result Value Ref Range    Vitamin D, 25-Hydroxy, Serum 68 30 - 100 ng/mL   POCT Blood Gas (CG8+)    Collection Time: 10/15/22 11:57 PM   Result Value Ref Range    ph, Willie ISTAT 7 379 7 300 - 7 400    pCO2, Willie i-STAT 36 2 (L) 42 0 - 50 0 mm HG    pO2, Willie i-STAT 39 0 35 0 - 45 0 mm HG    BE, i-STAT -3 (L) -2 - 3 mmol/L    HCO3, Willie i-STAT 21 4 (L) 24 0 - 30 0 mmol/L    CO2, i-STAT 22 21 - 32 mmol/L    O2 Sat, i-STAT 72 60 - 85 % SODIUM, I-STAT 140 136 - 145 mmol/l    Potassium, i-STAT 3 9 3 5 - 5 3 mmol/L    Calcium, Ionized i-STAT 1 05 (L) 1 12 - 1 32 mmol/L    Hct, i-STAT 38 34 8 - 46 1 %    Hgb, i-STAT 12 9 11 5 - 15 4 g/dl    Glucose, i-STAT 97 65 - 140 mg/dl    Specimen Type VENOUS    Type and screen    Collection Time: 10/16/22 12:46 AM   Result Value Ref Range    ABO Grouping O     Rh Factor Positive     Antibody Screen Negative     Specimen Expiration Date 20221019    CBC and differential    Collection Time: 10/16/22 12:46 AM   Result Value Ref Range    WBC 6 84 4 31 - 10 16 Thousand/uL    RBC 4 29 3 81 - 5 12 Million/uL    Hemoglobin 12 5 11 5 - 15 4 g/dL    Hematocrit 39 3 34 8 - 46 1 %    MCV 92 82 - 98 fL    MCH 29 1 26 8 - 34 3 pg    MCHC 31 8 31 4 - 37 4 g/dL    RDW 12 8 11 6 - 15 1 %    MPV 11 0 8 9 - 12 7 fL    Platelets 620 956 - 348 Thousands/uL    nRBC 0 /100 WBCs    Neutrophils Relative 63 43 - 75 %    Immat GRANS % 0 0 - 2 %    Lymphocytes Relative 29 14 - 44 %    Monocytes Relative 6 4 - 12 %    Eosinophils Relative 1 0 - 6 %    Basophils Relative 1 0 - 1 %    Neutrophils Absolute 4 28 1 85 - 7 62 Thousands/µL    Immature Grans Absolute 0 02 0 00 - 0 20 Thousand/uL    Lymphocytes Absolute 1 98 0 60 - 4 47 Thousands/µL    Monocytes Absolute 0 42 0 17 - 1 22 Thousand/µL    Eosinophils Absolute 0 09 0 00 - 0 61 Thousand/µL    Basophils Absolute 0 05 0 00 - 0 10 Thousands/µL   Basic metabolic panel    Collection Time: 10/16/22 12:46 AM   Result Value Ref Range    Sodium 140 135 - 147 mmol/L    Potassium 3 9 3 5 - 5 3 mmol/L    Chloride 107 96 - 108 mmol/L    CO2 24 21 - 32 mmol/L    ANION GAP 9 4 - 13 mmol/L    BUN 9 5 - 25 mg/dL    Creatinine 0 71 0 60 - 1 30 mg/dL    Glucose 97 65 - 140 mg/dL    Calcium 8 5 8 3 - 10 1 mg/dL    eGFR 100 ml/min/1 73sq m   Ethanol    Collection Time: 10/16/22 12:46 AM   Result Value Ref Range    Ethanol Lvl 171 (H) 0 - 3 mg/dL   Salicylate level    Collection Time: 10/16/22 12:46 AM Result Value Ref Range    Salicylate Lvl <3 (L) 3 - 20 mg/dL   Acetaminophen level-If concentration is detectable, please discuss with medical  on call  Collection Time: 10/16/22 12:46 AM   Result Value Ref Range    Acetaminophen Level <2 (L) 10 - 20 ug/mL        Physical Exam  Constitutional:       Appearance: Normal appearance  HENT:      Head: Normocephalic  Right Ear: Tympanic membrane, ear canal and external ear normal       Left Ear: Tympanic membrane, ear canal and external ear normal       Nose: Nose normal  No congestion  Mouth/Throat:      Mouth: Mucous membranes are moist       Pharynx: Oropharynx is clear  No oropharyngeal exudate or posterior oropharyngeal erythema  Eyes:      Extraocular Movements: Extraocular movements intact  Conjunctiva/sclera: Conjunctivae normal       Pupils: Pupils are equal, round, and reactive to light  Cardiovascular:      Rate and Rhythm: Normal rate and regular rhythm  Heart sounds: Normal heart sounds  No murmur heard  Pulmonary:      Effort: Pulmonary effort is normal       Breath sounds: Normal breath sounds  No wheezing or rales  Abdominal:      General: Bowel sounds are normal  There is no distension  Palpations: Abdomen is soft  Tenderness: There is no abdominal tenderness  Musculoskeletal:         General: Normal range of motion  Cervical back: Normal range of motion and neck supple  Right lower leg: No edema  Left lower leg: No edema  Lymphadenopathy:      Cervical: No cervical adenopathy  Skin:     General: Skin is warm  Comments: Left-side of scalp-staple present, area looks tender, fluctuation present, no active discharge   Neurological:      General: No focal deficit present  Mental Status: She is alert and oriented to person, place, and time

## 2022-10-26 NOTE — TELEPHONE ENCOUNTER
Pt dropped off FMLA forms, scanned copy in chart   She will be coming for appt on 10/28/22 to have these filled out by Dr Stephania Hu, forms put in red folder

## 2022-10-27 ENCOUNTER — OFFICE VISIT (OUTPATIENT)
Dept: SURGERY | Facility: CLINIC | Age: 52
End: 2022-10-27
Payer: COMMERCIAL

## 2022-10-27 ENCOUNTER — TELEPHONE (OUTPATIENT)
Dept: PSYCHIATRY | Facility: CLINIC | Age: 52
End: 2022-10-27

## 2022-10-27 VITALS
RESPIRATION RATE: 12 BRPM | SYSTOLIC BLOOD PRESSURE: 119 MMHG | TEMPERATURE: 97.2 F | DIASTOLIC BLOOD PRESSURE: 85 MMHG | HEIGHT: 65 IN | OXYGEN SATURATION: 98 % | HEART RATE: 100 BPM | WEIGHT: 188.6 LBS | BODY MASS INDEX: 31.42 KG/M2

## 2022-10-27 DIAGNOSIS — F43.10 PTSD (POST-TRAUMATIC STRESS DISORDER): Primary | ICD-10-CM

## 2022-10-27 DIAGNOSIS — L03.811 CELLULITIS OF SCALP: ICD-10-CM

## 2022-10-27 DIAGNOSIS — S16.1XXA CERVICAL STRAIN, ACUTE, INITIAL ENCOUNTER: ICD-10-CM

## 2022-10-27 DIAGNOSIS — S06.0X1D CONCUSSION WITH LOSS OF CONSCIOUSNESS OF 30 MINUTES OR LESS, SUBSEQUENT ENCOUNTER: ICD-10-CM

## 2022-10-27 PROCEDURE — 99214 OFFICE O/P EST MOD 30 MIN: CPT | Performed by: SURGERY

## 2022-10-27 NOTE — TELEPHONE ENCOUNTER
Called Hossein Brown  regarding STAT  Lvm advising to return call to Intake Dept at [phone] to be scheduled  Mansi King

## 2022-10-27 NOTE — ASSESSMENT & PLAN NOTE
- patient still symptomatic with headaches at this time  - GCS is 15 with no focal deficits  - suspect that headaches are in relation to possible cervical strain and collar  - will order MRI at this time to rule out any cervical spine injury  - suspect once collar was removed and patient can initiate PT and OT the headaches/concussion like symptoms will go away  - concussion education given at bedside  - also condition can be compounded based on positive PTSD screening scale as well as history of migraines

## 2022-10-27 NOTE — ASSESSMENT & PLAN NOTE
- wound with no surrounding erythema  - no purulent drainage noted at bedside; some serosanguineous drainage  - agree with Keflex started by PCP may be appropriate this time will continue to follow  - have wound recheck on next week to re-evaluate  - all staples have been removed at this time  - instructions given regarding cleaning of the wound site

## 2022-10-27 NOTE — ASSESSMENT & PLAN NOTE
- persistent cervical spine tenderness mainly paraspinal however with range of motion she has midline cervical spine tenderness  - no associated consistent upper extremity weakness and or sensation loss  - no acute concerns of central cord however secondary to persistent cervical spine tenderness will place an [employer] collar by ortho tech at bedside today and then subsequently patient will get MRI on Monday  - will follow-up results and discussed with patient; if negative collar can be removed at this time patient can initiate PT and OT  - strict return to ER precautions given to patient and patient's significant other at bedside

## 2022-10-27 NOTE — PROGRESS NOTES
Office Visit - trauma  Stacey Rios MRN: [id number]  Encounter: [id number]    Assessment and Plan  Problem List Items Addressed This Visit        Nervous and Auditory    Concussion wth loss of consciousness of 30 minutes or less     - patient still symptomatic with headaches at this time  - GCS is 15 with no focal deficits  - suspect that headaches are in relation to possible cervical strain and collar  - will order MRI at this time to rule out any cervical spine injury  - suspect once collar was removed and patient can initiate PT and OT the headaches/concussion like symptoms will go away  - concussion education given at bedside  - also condition can be compounded based on positive PTSD screening scale as well as history of migraines            Other    Cellulitis of scalp     - wound with no surrounding erythema  - no purulent drainage noted at bedside; some serosanguineous drainage  - agree with Keflex started by PCP may be appropriate this time will continue to follow  - have wound recheck on next week to re-evaluate  - all staples have been removed at this time  - instructions given regarding cleaning of the wound site  PTSD (post-traumatic stress disorder) - Primary     - PTSD screening positive  - stat referral placed per psych per guidelines  - no active SI or HI         Relevant Orders    Ambulatory Referral to Psychiatry      Other Visit Diagnoses     Cervical strain, acute, initial encounter        Relevant Orders    MRI cervical spine wo contrast        Disposition:  Will re-evaluate next week  MRI to be completed on Monday  Will continue with local wound care  Complete course of antibiotics with Keflex for possible scalp hematoma infection  Red flag symptoms discussed with patient at bedside her about going to the ER in the setting of an emergency for her cervical spine  Chief Complaint:  Stacey Rios is a 50 y o  female who presents for Follow-up (F/u fall   C-spine clearance and Patient notified of new formulary and to repeat labs as instructed in 4 weeks. Patient verbalized understanding.   [email or web address] )    Subjective  Patient offering continued complaints of neck pain primarily on the left paraspinal region  Also some midline neck tenderness with range of motion  Continues to work collar  Intermittent numbness in the left upper extremity sometimes occurs at night  Nothing consistent  Does not follow any radicular pattern  No associated ambulatory dysfunction  Consistent headaches  Complicated with history of migraines  Also screening positive for PTSD  No associated chest pain or shortness of breath  No nausea or vomiting      Past Medical History:   Diagnosis Date   • Asthma    • Colon polyp    • Gastroduodenitis    • Intestinal malabsorption    • Migraine    • Pyonephrosis    • UTI (urinary tract infection)    • Vitamin D deficiency        Past Surgical History:   Procedure Laterality Date   • ABDOMINAL SURGERY      Twisted bowel and internal hernia   • CT CYSTOGRAM  5/23/2017   • CT CYSTOGRAM  5/23/2017   • DILATION AND CURETTAGE OF UTERUS  2017   • GASTRIC BYPASS  06/2008   • LAPAROSCOPIC GASTRIC BANDING  2005   • TUBAL LIGATION     • WISDOM TOOTH EXTRACTION         Family History   Problem Relation Age of Onset   • Thyroid disease Mother    • Heart disease Family    • Diabetes Family    • Hypertension Family    • Breast cancer Family    • Colon cancer Family    • Prostate cancer Family    • Gallbladder disease Family    • Stomach cancer Family    • Ovarian cancer Family    • Kidney cancer Family    • Lung cancer Family    • Thyroid disease Family        Social History     Tobacco Use   • Smoking status: Former Smoker   • Smokeless tobacco: Never Used   • Tobacco comment: Social smoker   Vaping Use   • Vaping Use: Never used   Substance Use Topics   • Alcohol use: Yes     Comment: occasional   • Drug use: Never        Medications  Current Outpatient Medications on File Prior to Visit   Medication Sig Dispense Refill   • BIOTIN PO Take by mouth     • Butalbital-APAP-Caffeine (Fioricet) -40 MG CAPS Take 1 tablet by mouth daily as needed (migraine) 15 capsule 0   • CALCIUM CITRATE PO Take by mouth     • cephalexin (KEFLEX) 500 mg capsule Take 1 capsule (500 mg total) by mouth every 6 (six) hours for 10 days 40 capsule 0   • Diclofenac Sodium (VOLTAREN) 1 % Apply 2 g topically 4 (four) times a day 100 g 1   • ergocalciferol (VITAMIN D2) 50,000 units Take 2 capsules (100,000 Units total) by mouth once a week 8 capsule 5   • ferrous sulfate 324 (65 Fe) mg TAKE 1 TABLET (324 MG TOTAL) BY MOUTH 2 (TWO) TIMES A DAY BEFORE MEALS 60 tablet 0   • FOLIC ACID PO Take by mouth daily       • Multiple Vitamin (Daily-Isra Multivitamin) TABS TAKE 1 TABLET BY MOUTH EVERY DAY 90 tablet 3   • vitamin B-12 (VITAMIN B-12) 1,000 mcg tablet Take by mouth daily     • VITAMIN E PO Take by mouth     • Vyvanse 60 MG capsule Take 60 mg by mouth daily     • cloNIDine (CATAPRES) 0 1 mg tablet Take 0 1 mg by mouth daily Taking one daily  (Patient not taking: No sig reported)     • [DISCONTINUED] atorvastatin (LIPITOR) 80 mg tablet Take 80 mg by mouth daily     • [DISCONTINUED] clonazePAM (KlonoPIN) 2 mg tablet Take 2 mg by mouth 2 (two) times a day     • [DISCONTINUED] gabapentin (NEURONTIN) 800 mg tablet Take 800 mg by mouth 3 (three) times a day     • [DISCONTINUED] mirtazapine (REMERON) 45 MG tablet Take 45 mg by mouth daily at bedtime     • [DISCONTINUED] omeprazole (PriLOSEC) 20 mg delayed release capsule Take 20 mg by mouth daily     • [DISCONTINUED] traZODone (DESYREL) 100 mg tablet Take 100 mg by mouth daily at bedtime       No current facility-administered medications on file prior to visit  Allergies  No Known Allergies    Review of Systems   All other systems reviewed and are negative  Objective  Vitals:    10/27/22 1353   BP: 119/85   Pulse: 100   Resp: 12   Temp: (!) 97 2 °F (36 2 °C)   SpO2: 98%       Physical Exam  Vitals reviewed  Constitutional:       Appearance: Normal appearance   She is not ill-appearing  HENT:      Head:      Comments: Scalp laceration on the left parietal region; some drainage however does not appear grossly purulent  No pockets to express for purulent drainage  Minimal fluctuance  No surrounding erythema  Staples removed  Right Ear: External ear normal       Left Ear: External ear normal       Nose: Nose normal       Mouth/Throat:      Pharynx: Oropharynx is clear  Eyes:      Pupils: Pupils are equal, round, and reactive to light  Neck:      Comments: Tenderness noted primarily with mobility of the neck in the midline however primarily at rest in the left paraspinal region at the SCM muscle belly  Cardiovascular:      Rate and Rhythm: Normal rate and regular rhythm  Pulses: Normal pulses  Heart sounds: Normal heart sounds  Pulmonary:      Effort: Pulmonary effort is normal       Breath sounds: Normal breath sounds  Abdominal:      General: There is no distension  Palpations: Abdomen is soft  Tenderness: There is no abdominal tenderness  There is no guarding  Musculoskeletal:      Cervical back: Normal range of motion  Tenderness present  Comments: Strength intact in upper lower extremities  No sensation loss on upper lower extremities  Skin:     General: Skin is warm and dry  Neurological:      General: No focal deficit present  Mental Status: She is alert and oriented to person, place, and time  Mental status is at baseline  Cranial Nerves: No cranial nerve deficit

## 2022-10-28 ENCOUNTER — TELEPHONE (OUTPATIENT)
Dept: SURGERY | Facility: CLINIC | Age: 52
End: 2022-10-28

## 2022-10-28 NOTE — TELEPHONE ENCOUNTER
Patient was ordered MRI Cervical spine and is scheduled on Monday 10/31/22  Waiting on request for authorization  Called and spoke with insurance, they needed to speak with a physician  I informed Nancy Trinh who is calling them now  If patient is unable to receive the authorization she will have to be rescheduled

## 2022-10-28 NOTE — TELEPHONE ENCOUNTER
Behavorial Health Outpatient Intake Questions    Referred by: Trauma Doctor  Antoinette Cantu    Please advised interviewee that they need to answer all questions truthfully to allow for best care and any misrepresentations of information may affect their ability to be seen at this clinic   => Was this discussed? Yes     Behavorial Health Outpatient Intake History -     Presenting Problem (in patient's words):  Feels lost  PTSD  Crying a lot, depressed since she fell  Ashley Laws 10/15/22  Are there any developmental disabilities? ? If yes, can they speak to you on the phone? If they are too limited to speak to you on phone, refer out Yes - ADHD    Are you taking any psychiatric medications? Yes  - Vyvanse 60mg   => If yes, who prescribes? If yes, are they injectable medications? Does the patient have a language barrier or hearing impairment? No    Have you been treated at [address] [place] [address] by a therapist or a doctor in the past? If yes, who? No    Has the patient been hospitalized for mental health? No   If yes, how long ago was last hospitalization and where was it? Do you actively use alcohol or marijuana or illegal substances? If yes, what and how much - refer out to Drug and alcohol treatment if use is excessive or daily use of illegal substances No concerns of substance abuse are reported  Do you have a community treatment team or ? No    Legal History-     Does the patient have any history of arrests, long-term/residential time, or DUIs? Yes  If Yes- DUI 2009  1) What types of charges? 2) When were they last incarcerated? 3) Are they currently on parole or probation? Minor Child-    Who has custody of the child? Is there a custody agreement? If there is a custody agreement remind parent that they must bring a copy to the first appt or they will not be seen       Intake Team, please check with provider before scheduling if flags come up such as:  - complex case  - legal history (other than DUI)  - communication barrier concerns are present  - if, in your judgment, this needs further review    ACCEPTED as a patient Yes  => Appointment Date: 11/1/22 with Aleksandra Aguirre     Referred Elsewhere? No    Name of Insurance Co: [employer] ID# [id number]  Insurance Phone # [phone]  If ins is primary or secondary  If patient is a minor, parents information such as Name, D  O B of guarantor

## 2022-10-28 NOTE — TELEPHONE ENCOUNTER
Called Camila Will  regarding STAT  Lvm advising to return call to Intake Dept at [phone] to be scheduled  Kim Camejo

## 2022-10-28 NOTE — TELEPHONE ENCOUNTER
This pt has a STAT referral and her primary dx is PTSD  Spoke to patient and she requests in office visits  Is there any way you can see her as a new client?

## 2022-10-31 ENCOUNTER — TELEPHONE (OUTPATIENT)
Dept: INTERNAL MEDICINE CLINIC | Facility: CLINIC | Age: 52
End: 2022-10-31

## 2022-10-31 NOTE — TELEPHONE ENCOUNTER
received fax from [address] [place] [address] disability   forms  for demi GIPSON 1970 scanned gave to Reji Ruiz

## 2022-11-01 ENCOUNTER — SOCIAL WORK (OUTPATIENT)
Dept: BEHAVIORAL/MENTAL HEALTH CLINIC | Facility: CLINIC | Age: 52
End: 2022-11-01

## 2022-11-01 DIAGNOSIS — F43.10 PTSD (POST-TRAUMATIC STRESS DISORDER): ICD-10-CM

## 2022-11-01 NOTE — BH TREATMENT PLAN
Butch Patricia  1970       Date of Initial Treatment Plan: 11/1/22   Date of Current Treatment Plan: 11/01/22    Treatment Plan Number 1     Strengths/Personal Resources for Self Care: Laura Holland has a good support system     Diagnosis:   1  PTSD (post-traumatic stress disorder)  Ambulatory Referral to Psychiatry       Area of Needs: Mood, worry, fear , sadness      Long Term Goal 1: A decrease depression- I want to feel happy     Target Date: APR 30, 2023  Completion Date: TBD         Short Term Objectives for Goal 1: Aprilarn DBT and CCBT coping skills and practice at least one Acting Opposite daily to counter depression    Long Term Goal 2: decrease anxiety - I want to handle stress better     Target Date: APR 30, 2023  Completion Date: TBD    Short Term Objectives for Goal 2: A learn specific DBT and CBT skills and practice at [place] [address] one relaxation technique on a daily basis to counter stress          Long Term Goal # 3: N/A     Target Date: N/A  Completion Date: N/A    Short Term Objectives for Goal 3: N/A    GOAL 1: Modality: Individual 2x per month   Completion Date TBD along with current medication (Vyvance from PCP), responsible for which is Laura Holland, Therapist, and Doctor  GOAL 2: Modality: Individual 4x per month   Completion Date TBD along with current medication (Vyvance from PCP), responsible for which is Laura Holland, Therapist, and Doctor  GOAL 3: Modality: Individual NAx per month   Completion Date NA      Behavioral Health Treatment Plan [facility]: Diagnosis and Treatment Plan explained to Stephen Hood relates understanding diagnosis and is agreeable to Treatment Plan  Visit start and stop times:    11/01/22  Start Time: 1011  Stop Time: 1049  Total Visit Time: 38 minutes    Client Comments : Please share your thoughts, feelings, need and/or experiences regarding your treatment plan: Laura Holland is determined to start biweekly individual therapy and weekly Relaxation group to counter stress

## 2022-11-04 ENCOUNTER — HOSPITAL ENCOUNTER (OUTPATIENT)
Dept: RADIOLOGY | Age: 52
Discharge: HOME | End: 2022-11-04

## 2022-11-04 ENCOUNTER — TELEPHONE (OUTPATIENT)
Dept: OTHER | Facility: HOSPITAL | Age: 52
End: 2022-11-04

## 2022-11-04 DIAGNOSIS — S16.1XXA CERVICAL STRAIN, ACUTE, INITIAL ENCOUNTER: ICD-10-CM

## 2022-11-05 NOTE — TELEPHONE ENCOUNTER
Called and updated patient regarding MRI  Told to clear cervical collar at this time and she no longer needs to wear it  MRI results negative  Will follow up in clinic  Patient appreciated the phone call

## 2022-11-08 ENCOUNTER — OFFICE VISIT (OUTPATIENT)
Dept: SURGERY | Facility: CLINIC | Age: 52
End: 2022-11-08

## 2022-11-08 VITALS
TEMPERATURE: 97.1 F | SYSTOLIC BLOOD PRESSURE: 117 MMHG | HEIGHT: 65 IN | BODY MASS INDEX: 31.36 KG/M2 | DIASTOLIC BLOOD PRESSURE: 80 MMHG | WEIGHT: 188.2 LBS | HEART RATE: 86 BPM

## 2022-11-08 DIAGNOSIS — S09.90XD TRAUMATIC INJURY OF HEAD, SUBSEQUENT ENCOUNTER: ICD-10-CM

## 2022-11-08 DIAGNOSIS — S06.0X1D CONCUSSION WITH LOSS OF CONSCIOUSNESS OF 30 MINUTES OR LESS, SUBSEQUENT ENCOUNTER: ICD-10-CM

## 2022-11-08 DIAGNOSIS — L03.811 CELLULITIS OF SCALP: ICD-10-CM

## 2022-11-08 DIAGNOSIS — S16.1XXD ACUTE STRAIN OF NECK MUSCLE, SUBSEQUENT ENCOUNTER: Primary | ICD-10-CM

## 2022-11-08 NOTE — LETTER
November 8, 2022     Patient: Hailey Wyman  YOB: 1970  Date of Visit: 11/8/2022      To Whom it May Concern:    Hailey Wyman is under my professional care  Tasha Aguilar was seen in my office on 11/8/2022  Tashayael Sheikh may return to work on 11/14/22   Still requires Physical Therapy  If you have any questions or concerns, please don't hesitate to call           Sincerely,          KEE TRAUMA PROVIDER        CC: No Recipients

## 2022-11-08 NOTE — PROGRESS NOTES
Office Visit - General Surgery  Hugo Atwood MRN: [id number]  Encounter: [id number]    Assessment and Plan  Problem List Items Addressed This Visit        Nervous and Auditory    Concussion wth loss of consciousness of 30 minutes or less     Patient doing well and would like to return to work  - Note given to RTW Monday 11/ 14            Musculoskeletal and Integument    Cervical strain, acute - Primary     MRI Negative for ligamentous injury  - Patient still complains of Left sided neck and shoulder 'shooting' pain  - Will refer to PT for cervical strain  - May RTW next week, works in [employer]  - F/U PRN         Relevant Orders    Ambulatory Referral to Physical Therapy       Other    Head trauma     Patient s/p fall  Scalp laceration   - Doing well from concussion standpoint  - F/U PRN         Cellulitis of scalp     Cellulitis of scalp treated with keflex  - Resolved  - F/U PRN               Chief Complaint:  Hugo Atwood is a 50 y o  female who presents for Follow-up (MRI results and check head laceration )    Subjective  Patient states she is doing well except for persistent cervicale strain  She states that she has pain in the lateral area (paraspinal) left neck that extends to her shoulder and sometimes down her arm  She states she sometimes feels tingling or shooting sensation  MRI of her cervicale spine was negative for ligamentous injury  She may have cervical strain vs some brachial plexus strain  I will send her for Physical Therapy  In the meantime she would like to go back to work on Monday 11/14  She works in [employer]      Past Medical History:   Diagnosis Date   • Asthma    • Colon polyp    • Gastroduodenitis    • Intestinal malabsorption    • Migraine    • Pyonephrosis    • UTI (urinary tract infection)    • Vitamin D deficiency        Past Surgical History:   Procedure Laterality Date   • ABDOMINAL SURGERY      Twisted bowel and internal hernia   • CT CYSTOGRAM  5/23/2017   • CT CYSTOGRAM  5/23/2017   • DILATION AND CURETTAGE OF UTERUS  2017   • GASTRIC BYPASS  06/2008   • LAPAROSCOPIC GASTRIC BANDING  2005   • TUBAL LIGATION     • WISDOM TOOTH EXTRACTION         Family History   Problem Relation Age of Onset   • Thyroid disease Mother    • Heart disease Family    • Diabetes Family    • Hypertension Family    • Breast cancer Family    • Colon cancer Family    • Prostate cancer Family    • Gallbladder disease Family    • Stomach cancer Family    • Ovarian cancer Family    • Kidney cancer Family    • Lung cancer Family    • Thyroid disease Family        Social History     Tobacco Use   • Smoking status: Former Smoker   • Smokeless tobacco: Never Used   • Tobacco comment: Social smoker   Vaping Use   • Vaping Use: Never used   Substance Use Topics   • Alcohol use: Yes     Comment: occasional   • Drug use: Never        Medications  Current Outpatient Medications on File Prior to Visit   Medication Sig Dispense Refill   • BIOTIN PO Take by mouth     • Butalbital-APAP-Caffeine (Fioricet) -40 MG CAPS Take 1 tablet by mouth daily as needed (migraine) 15 capsule 0   • CALCIUM CITRATE PO Take by mouth     • Diclofenac Sodium (VOLTAREN) 1 % Apply 2 g topically 4 (four) times a day 100 g 1   • ergocalciferol (VITAMIN D2) 50,000 units Take 2 capsules (100,000 Units total) by mouth once a week 8 capsule 5   • ferrous sulfate 324 (65 Fe) mg TAKE 1 TABLET (324 MG TOTAL) BY MOUTH 2 (TWO) TIMES A DAY BEFORE MEALS 60 tablet 0   • Multiple Vitamin (Daily-Isra Multivitamin) TABS TAKE 1 TABLET BY MOUTH EVERY DAY 90 tablet 3   • VITAMIN E PO Take by mouth     • Vyvanse 60 MG capsule Take 60 mg by mouth daily     • cloNIDine (CATAPRES) 0 1 mg tablet Take 0 1 mg by mouth daily Taking one daily  (Patient not taking: No sig reported)     • FOLIC ACID PO Take by mouth daily       • vitamin B-12 (VITAMIN B-12) 1,000 mcg tablet Take by mouth daily     • [DISCONTINUED] atorvastatin (LIPITOR) 80 mg tablet Take 80 mg by mouth daily     • [DISCONTINUED] clonazePAM (KlonoPIN) 2 mg tablet Take 2 mg by mouth 2 (two) times a day     • [DISCONTINUED] gabapentin (NEURONTIN) 800 mg tablet Take 800 mg by mouth 3 (three) times a day     • [DISCONTINUED] mirtazapine (REMERON) 45 MG tablet Take 45 mg by mouth daily at bedtime     • [DISCONTINUED] omeprazole (PriLOSEC) 20 mg delayed release capsule Take 20 mg by mouth daily     • [DISCONTINUED] traZODone (DESYREL) 100 mg tablet Take 100 mg by mouth daily at bedtime       No current facility-administered medications on file prior to visit  Allergies  No Known Allergies    Review of Systems   Constitutional: Negative  Respiratory: Negative  Cardiovascular: Negative  Objective  Vitals:    11/08/22 1343   BP: 117/80   Pulse: 86   Temp: (!) 97 1 °F (36 2 °C)       Physical Exam  Constitutional:       General: She is not in acute distress  Appearance: Normal appearance  She is not ill-appearing or toxic-appearing  HENT:      Head:      Comments: Scalp laceration healing, no signs and symptoms of infection noted  Musculoskeletal:         General: Tenderness present  Normal range of motion  Comments: + point tenderness to back and below clavicle with pressure  Skin:     General: Skin is warm  Capillary Refill: Capillary refill takes less than 2 seconds  Neurological:      General: No focal deficit present  Mental Status: She is alert and oriented to person, place, and time     Psychiatric:         Behavior: Behavior normal

## 2022-11-08 NOTE — ASSESSMENT & PLAN NOTE
MRI Negative for ligamentous injury  - Patient still complains of Left sided neck and shoulder 'shooting' pain  - Will refer to PT for cervical strain  - May RTW next week, works in [employer]  - F/U PRN

## 2022-11-23 ENCOUNTER — SOCIAL WORK (OUTPATIENT)
Dept: BEHAVIORAL/MENTAL HEALTH CLINIC | Facility: CLINIC | Age: 52
End: 2022-11-23

## 2022-11-23 DIAGNOSIS — F43.10 PTSD (POST-TRAUMATIC STRESS DISORDER): Primary | ICD-10-CM

## 2022-11-23 NOTE — PSYCH
Psychotherapy Provided: Individual Psychotherapy 48 minutes     Length of time in session: 48 minutes, follow up in 2 week    Encounter Diagnosis     ICD-10-CM    1  PTSD (post-traumatic stress disorder)  F43 10           Goals addressed in session: Goal 1 and Goal 2     Pain:      moderate to severe    6    Current suicide risk : Low     Data: Jackie Rodriguez discussed her daily struggle with chronic acute pain, not funtioning normally, and feeling low Person-Centered, DBT-based, and CBT methods are the effective combination to rise Pt's awareness about self-talk impact and effective ways to counter stress and flash backs  Assessment: Jackie Rodriguez appeared not confident, discouraged, yet talkative and expressive  Plan: Jackie Rodriguez will continue biweekly or triweekly depending on availability and will work on relaxation within the limitations of finances and daily schedule with other providers  She will join the Relaxation Group when off work earlier and will come for individual sessions to work on stress response  Behavioral Health Treatment Plan [facility]: Diagnosis and Treatment Plan explained to Carmen Rodgers relates understanding diagnosis and is agreeable to Treatment Plan   Yes     Visit start and stop times:    11/23/22  Start Time: 0511  Stop Time: 0559  Total Visit Time: 48 minutes

## 2022-12-02 ENCOUNTER — TELEMEDICINE (OUTPATIENT)
Dept: BEHAVIORAL/MENTAL HEALTH CLINIC | Facility: CLINIC | Age: 52
End: 2022-12-02

## 2022-12-02 DIAGNOSIS — F43.10 PTSD (POST-TRAUMATIC STRESS DISORDER): Primary | ICD-10-CM

## 2022-12-02 NOTE — PSYCH
Virtual Regular Visit    Verification of patient location:    Patient is located in the following state in which I hold an active license [place]      Assessment/Plan:    Problem List Items Addressed This Visit        Other    PTSD (post-traumatic stress disorder) - Primary       Goals addressed in session: Goal 1 and Goal 2          Reason for visit is No chief complaint on file  Encounter provider Luisa Camilo [facility]    Provider located at [address] [place] [id number] [phone]      Recent Visits  No visits were found meeting these conditions  Showing recent visits within past 7 days and meeting all other requirements  Future Appointments  No visits were found meeting these conditions  Showing future appointments within next 150 days and meeting all other requirements       The patient was identified by name and date of birth  Madison Walden was informed that this is a telemedicine visit and that the visit is being conducted [employer]  She agrees to proceed     My office door was closed  The patient was notified the following individuals were present in the room other group mebers  She acknowledged consent and understanding of privacy and security of the video platform  The patient has agreed to participate and understands they can discontinue the visit at any time  Patient is aware this is a billable service  Subjective  Madison Walden is a 50 y o  female    Data: Koko Galeas attended the Relaxation group with a delay and was guided through the traditional first part of diaphragmatic breathing (combined with humming in the [place] and done more separately after) with progressive muscle relaxation   Next, attendees were guided through a body scan and mindfulness meditation for grounding, self-compassion and self-forgiveness, attending to physical and emotional pain, followed by affirmations with gratitude and against anxiety and depression  Psychoeducation, DBT-based, and group approaches were used to support the cohesion and maintain the serene environment for the exercise’s purpose  Assessment: Beulah Espinal followed through guidance and completed the exercises, showed willingness to continue learning and practicing self-care  Plan: Beulah Espinal will practice deep breathing and other techniques based on each occasion to counter stress and will attend the next group to be able to take part in further guided exercises on chosen topics to build on the techniques learned in the beginning      HPI     Past Medical History:   Diagnosis Date   • Asthma    • Colon polyp    • Gastroduodenitis    • Intestinal malabsorption    • Migraine    • Pyonephrosis    • UTI (urinary tract infection)    • Vitamin D deficiency        Past Surgical History:   Procedure Laterality Date   • ABDOMINAL SURGERY      Twisted bowel and internal hernia   • CT CYSTOGRAM  5/23/2017   • CT CYSTOGRAM  5/23/2017   • DILATION AND CURETTAGE OF UTERUS  2017   • GASTRIC BYPASS  06/2008   • LAPAROSCOPIC GASTRIC BANDING  2005   • TUBAL LIGATION     • WISDOM TOOTH EXTRACTION         Current Outpatient Medications   Medication Sig Dispense Refill   • BIOTIN PO Take by mouth     • Butalbital-APAP-Caffeine (Fioricet) -40 MG CAPS Take 1 tablet by mouth daily as needed (migraine) 15 capsule 0   • CALCIUM CITRATE PO Take by mouth     • cloNIDine (CATAPRES) 0 1 mg tablet Take 0 1 mg by mouth daily Taking one daily  (Patient not taking: No sig reported)     • Diclofenac Sodium (VOLTAREN) 1 % Apply 2 g topically 4 (four) times a day 100 g 1   • ergocalciferol (VITAMIN D2) 50,000 units Take 2 capsules (100,000 Units total) by mouth once a week 8 capsule 5   • ferrous sulfate 324 (65 Fe) mg TAKE 1 TABLET (324 MG TOTAL) BY MOUTH 2 (TWO) TIMES A DAY BEFORE MEALS 60 tablet 0   • FOLIC ACID PO Take by mouth daily       • Multiple Vitamin (Daily-Isra Multivitamin) TABS TAKE 1 TABLET BY MOUTH EVERY DAY 90 tablet 3   • vitamin B-12 (VITAMIN B-12) 1,000 mcg tablet Take by mouth daily     • VITAMIN E PO Take by mouth     • Vyvanse 60 MG capsule Take 60 mg by mouth daily       No current facility-administered medications for this visit  No Known Allergies    Review of Systems    Video Exam    There were no vitals filed for this visit      Physical Exam     12/02/22  Start Time: 0430  Stop Time: 0500  Total Visit Time: 30 minutes

## 2022-12-12 ENCOUNTER — OFFICE VISIT (OUTPATIENT)
Dept: OBGYN CLINIC | Facility: MEDICAL CENTER | Age: 52
End: 2022-12-12

## 2022-12-12 VITALS — HEIGHT: 65 IN | BODY MASS INDEX: 31.32 KG/M2 | WEIGHT: 188 LBS

## 2022-12-12 DIAGNOSIS — M77.12 LATERAL EPICONDYLITIS OF BOTH ELBOWS: Primary | ICD-10-CM

## 2022-12-12 DIAGNOSIS — M77.11 LATERAL EPICONDYLITIS OF BOTH ELBOWS: Primary | ICD-10-CM

## 2022-12-12 NOTE — PROGRESS NOTES
HPI:  Pt is a 51 yo female with bilateral lateral epicondylitis  She received steroid injection on 10/10/22 which helped for a few weeks  She has been performing home exercises  She notes that her symptoms can keep her up at night at times  PE: BUE:  Tender over bilateral lateral elbow epicondyles, no erythema, no induration, able to make a full fist complexes, elbow stable on stress, SILT    A/P:  Pt is a 51 yo female with bilateral lateral epicondylitis   -Discussed treatment options   -She would like to try formal therapy  Prescription provided   -Home exercises will be reinforced at therapy   -Activity as tolerated  -NSAIDs as tolerated consistently for a week with meals     -F/U in 10 weeks

## 2022-12-22 ENCOUNTER — TELEPHONE (OUTPATIENT)
Dept: PSYCHIATRY | Facility: CLINIC | Age: 52
End: 2022-12-22

## 2022-12-22 NOTE — TELEPHONE ENCOUNTER
Patient is calling regarding cancelling an appointment      Date/Time: 12/22/2022 at 5 pm    Reason: Pt has bad migraine    Patient was rescheduled: YES [] NO [x]  If yes, when was Patient reschedule for: n/a    Patient requesting call back to reschedule: YES [] NO [x]

## 2022-12-29 ENCOUNTER — SOCIAL WORK (OUTPATIENT)
Dept: BEHAVIORAL/MENTAL HEALTH CLINIC | Facility: CLINIC | Age: 52
End: 2022-12-29

## 2022-12-29 DIAGNOSIS — F43.10 PTSD (POST-TRAUMATIC STRESS DISORDER): Primary | ICD-10-CM

## 2022-12-29 NOTE — PSYCH
Psychotherapy Provided: Individual Psychotherapy 39 minutes     Length of time in session: 39 minutes, follow up in 2 week    Encounter Diagnosis     ICD-10-CM    1  PTSD (post-traumatic stress disorder)  F43 10           Goals addressed in session: Goal 1     Pain:      Severe migraine with auras oftentimes     8 ranging to 10+       Current suicide risk : Low     Data: Melissa Powers addressed severe migraine pain and was given resources for case management through her county and information about [facility]  She shared that she can work less and less and has increasing restrictions  Person-Centered, DBT-based, and CBT methods assist Pt gain more information about nervous system stress response and different responses that could be learned to change reaction to perceived threat  Assessment: Melissa Powers appeared in apparent discomfort from migrane pain  She was able to focus on important topics and discussed her concerns  Plan: Melissa Powers will try to be more consistent in attending her sessions and will work on stress reduction through Mindfulness and when possible, Relaxation Group on Fridays  Melissa Powers will learn more about Mindfulness and Vagus nerve that could help her exercise to help this nerve when its overactive  Behavioral Health Treatment Plan [facility]: Diagnosis and Treatment Plan explained to Joan Meza relates understanding diagnosis and is agreeable to Treatment Plan   Yes     Visit start and stop times:    12/29/22  Start Time: 0502  Stop Time: 0095  Total Visit Time: 39 minutes

## 2023-01-11 ENCOUNTER — TELEPHONE (OUTPATIENT)
Dept: PSYCHIATRY | Facility: CLINIC | Age: 53
End: 2023-01-11

## 2023-01-11 NOTE — TELEPHONE ENCOUNTER
Patient is scheduled for a virtual appt  On 1/13/23  Unsure how to connect  Asked her to call us back to confirm either phone number or e mail for virtual appt   1/11/23

## 2023-01-19 ENCOUNTER — TELEMEDICINE (OUTPATIENT)
Dept: BEHAVIORAL/MENTAL HEALTH CLINIC | Facility: CLINIC | Age: 53
End: 2023-01-19

## 2023-01-19 DIAGNOSIS — F43.10 PTSD (POST-TRAUMATIC STRESS DISORDER): Primary | ICD-10-CM

## 2023-01-19 NOTE — PSYCH
Virtual Regular Visit    Verification of patient location:    Patient is located in the following state in which I hold an active license [place]      Assessment/Plan:    Problem List Items Addressed This Visit        Other    PTSD (post-traumatic stress disorder) - Primary       Goals addressed in session: Goal 1 and Goal 2          Reason for visit is No chief complaint on file  [facility]    Provider located at [address] [place], [place] [address] [id number] [phone]      Recent Visits  No visits were found meeting these conditions  Showing recent visits within past 7 days and meeting all other requirements  Future Appointments  No visits were found meeting these conditions  Showing future appointments within next 150 days and meeting all other requirements       The patient was identified by name and date of birth  Kelli Carver was informed that this is a telemedicine visit and that the visit is being conducted throughthe [employer] platform  She agrees to proceed     My office door was closed  No one else was in the room  She acknowledged consent and understanding of privacy and security of the video platform  The patient has agreed to participate and understands they can discontinue the visit at any time  Patient is aware this is a billable service  Subjective  Kelli Carver is a 50 y o  female  Data: Shayla Guerrero reported a job change and very good behavioral and mood improvement  Person-Centered, DBT-based, and CBT techniques helped Pt identify areas for further, more in-depth work and some effective tools to help herself with  Assessment: Shayla Guerrero sounded happier, more optimistic, and less anxious  She was able to focus on effective ways to ensure more safety while walking and stability on her shower to counter fear of injuries       Plan: Shayla Guerrero will ensure safety in shower and while walking and will put her best effort to improve her sleep- she will try binaural beets for sleep and nature sounds that are soothing and will work on sleep hygiene  HPI     Past Medical History:   Diagnosis Date   • Asthma    • Colon polyp    • Gastroduodenitis    • Intestinal malabsorption    • Migraine    • Pyonephrosis    • UTI (urinary tract infection)    • Vitamin D deficiency        Past Surgical History:   Procedure Laterality Date   • ABDOMINAL SURGERY      Twisted bowel and internal hernia   • CT CYSTOGRAM  5/23/2017   • CT CYSTOGRAM  5/23/2017   • DILATION AND CURETTAGE OF UTERUS  2017   • GASTRIC BYPASS  06/2008   • LAPAROSCOPIC GASTRIC BANDING  2005   • TUBAL LIGATION     • WISDOM TOOTH EXTRACTION         Current Outpatient Medications   Medication Sig Dispense Refill   • BIOTIN PO Take by mouth     • Butalbital-APAP-Caffeine (Fioricet) -40 MG CAPS Take 1 tablet by mouth daily as needed (migraine) 15 capsule 0   • CALCIUM CITRATE PO Take by mouth     • cloNIDine (CATAPRES) 0 1 mg tablet Take 0 1 mg by mouth daily Taking one daily  (Patient not taking: No sig reported)     • Diclofenac Sodium (VOLTAREN) 1 % Apply 2 g topically 4 (four) times a day 100 g 1   • ergocalciferol (VITAMIN D2) 50,000 units Take 2 capsules (100,000 Units total) by mouth once a week 8 capsule 5   • ferrous sulfate 324 (65 Fe) mg TAKE 1 TABLET (324 MG TOTAL) BY MOUTH 2 (TWO) TIMES A DAY BEFORE MEALS 60 tablet 0   • FOLIC ACID PO Take by mouth daily       • Multiple Vitamin (Daily-Isra Multivitamin) TABS TAKE 1 TABLET BY MOUTH EVERY DAY 90 tablet 3   • vitamin B-12 (VITAMIN B-12) 1,000 mcg tablet Take by mouth daily     • VITAMIN E PO Take by mouth     • Vyvanse 60 MG capsule Take 60 mg by mouth daily       No current facility-administered medications for this visit  No Known Allergies    Review of Systems    Video Exam    There were no vitals filed for this visit      Physical Exam       01/19/23  Start Time: 0529  Stop Time: 0324  Total Visit Time: 32 minutes

## 2023-02-16 ENCOUNTER — TELEMEDICINE (OUTPATIENT)
Dept: BEHAVIORAL/MENTAL HEALTH CLINIC | Facility: CLINIC | Age: 53
End: 2023-02-16

## 2023-02-16 DIAGNOSIS — F43.10 PTSD (POST-TRAUMATIC STRESS DISORDER): Primary | ICD-10-CM

## 2023-02-16 NOTE — PSYCH
Virtual Regular Visit    Verification of patient location:    Patient is located in the following state in which I hold an active license [place]      Assessment/Plan:    Problem List Items Addressed This Visit        Other    PTSD (post-traumatic stress disorder) - Primary       Goals addressed in session: Goal 1 and Goal 2          Reason for visit is No chief complaint on file  Encounter provider Ubaldo Metcalf [facility] [place]    Provider located at [address] [place] [id number] [phone]      Recent Visits  No visits were found meeting these conditions  Showing recent visits within past 7 days and meeting all other requirements  Future Appointments  No visits were found meeting these conditions  Showing future appointments within next 150 days and meeting all other requirements       The patient was identified by name and date of birth  Anabella Grover was informed that this is a telemedicine visit and that the visit is being conducted throughthe [employer] platform  She agrees to proceed     My office door was closed  No one else was in the room  She acknowledgd consent and understanding of privacy and security of the video platform  The patient has agreed to participate and understands they can discontinue the visit at any time    Patient is aware this is a billable service  Subjective  Anabella Grover is a 50 y o  female  Data: Aliza Montes reported feeling severe migraine and wanted to turn her session to virtual   Her phone  Was included for better audio  She stayed for a short session due to feeling nauseated and very sensitive to sounds and light  Person-Centered, Mindfulness, and CBT methods helped Pt focus on self-talk and sitting with pain without judgment  Assessment: Aliza Montes was apparently in acute pain and distress from her condition   She was frustrated that she got a relief recently but did not last longer  Plan: Aliza Montes will attend her session next week  She wants to stay in therapy even though it was offered otherwise until she feels more and longer relief  Aliza Montes will try to incorporate Mindfulness in her daily routine while in this condition to be able to accept and deal better with pain  HPI     Past Medical History:   Diagnosis Date   • Asthma    • Colon polyp    • Gastroduodenitis    • Intestinal malabsorption    • Migraine    • Pyonephrosis    • UTI (urinary tract infection)    • Vitamin D deficiency        Past Surgical History:   Procedure Laterality Date   • ABDOMINAL SURGERY      Twisted bowel and internal hernia   • CT CYSTOGRAM  5/23/2017   • CT CYSTOGRAM  5/23/2017   • DILATION AND CURETTAGE OF UTERUS  2017   • GASTRIC BYPASS  06/2008   • LAPAROSCOPIC GASTRIC BANDING  2005   • TUBAL LIGATION     • WISDOM TOOTH EXTRACTION         Current Outpatient Medications   Medication Sig Dispense Refill   • BIOTIN PO Take by mouth     • Butalbital-APAP-Caffeine (Fioricet) -40 MG CAPS Take 1 tablet by mouth daily as needed (migraine) 15 capsule 0   • CALCIUM CITRATE PO Take by mouth     • cloNIDine (CATAPRES) 0 1 mg tablet Take 0 1 mg by mouth daily Taking one daily  (Patient not taking: No sig reported)     • Diclofenac Sodium (VOLTAREN) 1 % Apply 2 g topically 4 (four) times a day 100 g 1   • ergocalciferol (VITAMIN D2) 50,000 units Take 2 capsules (100,000 Units total) by mouth once a week 8 capsule 5   • ferrous sulfate 324 (65 Fe) mg TAKE 1 TABLET (324 MG TOTAL) BY MOUTH 2 (TWO) TIMES A DAY BEFORE MEALS 60 tablet 0   • FOLIC ACID PO Take by mouth daily       • Multiple Vitamin (Daily-Isra Multivitamin) TABS TAKE 1 TABLET BY MOUTH EVERY DAY 90 tablet 3   • vitamin B-12 (VITAMIN B-12) 1,000 mcg tablet Take by mouth daily     • VITAMIN E PO Take by mouth     • Vyvanse 60 MG capsule Take 60 mg by mouth daily       No current facility-administered medications for this visit          No Known Allergies    Review of Systems    Video Exam    There were no vitals filed for this visit      Physical Exam     02/16/23  Start Time: 9495  Stop Time: 0326  Total Visit Time: 21 minutes

## 2023-02-22 ENCOUNTER — OFFICE VISIT (OUTPATIENT)
Dept: LAB | Facility: CLINIC | Age: 53
End: 2023-02-22

## 2023-02-22 DIAGNOSIS — Z01.818 OTHER SPECIFIED PRE-OPERATIVE EXAMINATION: ICD-10-CM

## 2023-02-22 LAB
ATRIAL RATE: 69 BPM
P AXIS: 49 DEGREES
PR INTERVAL: 130 MS
QRS AXIS: 45 DEGREES
QRSD INTERVAL: 88 MS
QT INTERVAL: 386 MS
QTC INTERVAL: 413 MS
T WAVE AXIS: 51 DEGREES
VENTRICULAR RATE: 69 BPM

## 2023-03-09 ENCOUNTER — TELEMEDICINE (OUTPATIENT)
Dept: BEHAVIORAL/MENTAL HEALTH CLINIC | Facility: CLINIC | Age: 53
End: 2023-03-09

## 2023-03-09 DIAGNOSIS — F43.10 PTSD (POST-TRAUMATIC STRESS DISORDER): Primary | ICD-10-CM

## 2023-03-09 NOTE — PSYCH
Virtual Regular Visit    Verification of patient location:    Patient is located in the following state in which I hold an active license [place]      Assessment/Plan:    Problem List Items Addressed This Visit        Other    PTSD (post-traumatic stress disorder) - Primary       Goals addressed in session: Goal 1 and Goal 2          Reason for visit is No chief complaint on file  [place] [facility] [place] [address] [place] [id number] [phone]      Recent Visits  No visits were found meeting these conditions  Showing recent visits within past 7 days and meeting all other requirements  Future Appointments  No visits were found meeting these conditions  Showing future appointments within next 150 days and meeting all other requirements       The patient was identified by name and date of birth  Verner Clos was informed that this is a telemedicine visit and that the visit is being conducted throughthe [employer]  She agrees to proceed     My office door was closed  No one else was in the room  She acknowledged consent and understanding of privacy and security of the video platform  The patient has agreed to participate and understands they can discontinue the visit at any time  Patient is aware this is a billable service  Subjective  Verner Clos is a 50 y o  female  Data: Hesham Boss shared her psychological pain of losing her job but shared that she was feeling better with her sleep and migraine  Person-Centered, DBT-based, and CBT methods helped Pt express her emotions and take a more observing position, towards to the middle ground, without judgment  The connection froze a couple of times during the session, but Pt was able to connect again and resume her discussion  Assessment: Hesham Boss was trying hard to be positive despite her hardship   She had relevant thought content and valid concerns  Edwin Huddleston maintained sufficient eye contact and had insight into self-care  Her mood was on congruence with her affect  Plan: Edwin Huddleston will follow her planned steps for the next few weeks to take a legal advice about how she was treated at work and will focus on her wellbeing before applying for other jobs  She will keep good levels of self-care and will join the [employer] to finish the week with Mindfulness meditation and muscle work with breathing       HPI     Past Medical History:   Diagnosis Date   • Asthma    • Colon polyp    • Gastroduodenitis    • Intestinal malabsorption    • Migraine    • Pyonephrosis    • UTI (urinary tract infection)    • Vitamin D deficiency        Past Surgical History:   Procedure Laterality Date   • ABDOMINAL SURGERY      Twisted bowel and internal hernia   • CT CYSTOGRAM  5/23/2017   • CT CYSTOGRAM  5/23/2017   • DILATION AND CURETTAGE OF UTERUS  2017   • GASTRIC BYPASS  06/2008   • LAPAROSCOPIC GASTRIC BANDING  2005   • TUBAL LIGATION     • WISDOM TOOTH EXTRACTION         Current Outpatient Medications   Medication Sig Dispense Refill   • BIOTIN PO Take by mouth     • Butalbital-APAP-Caffeine (Fioricet) -40 MG CAPS Take 1 tablet by mouth daily as needed (migraine) 15 capsule 0   • CALCIUM CITRATE PO Take by mouth     • cloNIDine (CATAPRES) 0 1 mg tablet Take 0 1 mg by mouth daily Taking one daily  (Patient not taking: No sig reported)     • Diclofenac Sodium (VOLTAREN) 1 % Apply 2 g topically 4 (four) times a day 100 g 1   • ergocalciferol (VITAMIN D2) 50,000 units Take 2 capsules (100,000 Units total) by mouth once a week 8 capsule 5   • ferrous sulfate 324 (65 Fe) mg TAKE 1 TABLET (324 MG TOTAL) BY MOUTH 2 (TWO) TIMES A DAY BEFORE MEALS 60 tablet 0   • FOLIC ACID PO Take by mouth daily       • Multiple Vitamin (Daily-Isra Multivitamin) TABS TAKE 1 TABLET BY MOUTH EVERY DAY 90 tablet 3   • vitamin B-12 (VITAMIN B-12) 1,000 mcg tablet Take by mouth daily     • VITAMIN E PO Take by mouth     • Vyvanse 60 MG capsule Take 60 mg by mouth daily       No current facility-administered medications for this visit  No Known Allergies    Review of Systems    Video Exam    There were no vitals filed for this visit      Physical Exam     03/09/23  Start Time: 0957  Stop Time: 6854  Total Visit Time: 38 minutes

## 2023-03-24 ENCOUNTER — TELEMEDICINE (OUTPATIENT)
Dept: BEHAVIORAL/MENTAL HEALTH CLINIC | Facility: CLINIC | Age: 53
End: 2023-03-24

## 2023-03-24 DIAGNOSIS — F43.10 PTSD (POST-TRAUMATIC STRESS DISORDER): Primary | ICD-10-CM

## 2023-03-24 NOTE — PSYCH
No Call  No Show  No Charge    Valdemar Singh no showed 03/24/23 appointment    Treatment Plan not due at this session  though it would be while Th is on vacation, which should have been discussed during this session, and the Tori might have been updated   left a voicemail letting Pt aware about her [place] plan and the hopes that she would attend the scheduled May appointment      03/24/23  Start Time: 0000  Stop Time: 0000  Total Visit Time: 0 minutes

## 2023-05-11 ENCOUNTER — TELEPHONE (OUTPATIENT)
Dept: PSYCHIATRY | Facility: CLINIC | Age: 53
End: 2023-05-11

## 2023-05-11 NOTE — TELEPHONE ENCOUNTER
NO-SHOW LETTER MAILED TO [facility]    ADDRESS: [address] [facility] [address] [facility] [id number]     Mailed 5/11

## 2023-05-31 ENCOUNTER — TELEPHONE (OUTPATIENT)
Dept: PSYCHIATRY | Facility: CLINIC | Age: 53
End: 2023-05-31

## 2023-05-31 ENCOUNTER — DOCUMENTATION (OUTPATIENT)
Dept: BEHAVIORAL/MENTAL HEALTH CLINIC | Facility: CLINIC | Age: 53
End: 2023-05-31

## 2023-05-31 NOTE — PROGRESS NOTES
Psychotherapy Discharge Summary    Preferred Name: Elliot Youssef  YOB: 1970    Admission date to psychotherapy: 11/1/22    Referred by: doctor, self    Presenting Problem: anxiety and depression due to PTSD    Course of treatment included : psychoeducation, psychiatric evaluation and individual therapy     Progress/Outcome of Treatment Goals (brief summary of course of treatment) Veronique Ron did not achieve a lot of progress due to irregular attendance and dropping out of treatment due to failure to respond to outreach  Treatment Complications (if any): NA    Treatment Progress: poor    Current [facility] Psychiatric Provider: NA    Discharge Medications include: as noted in chart    Discharge Date: 5/31/23    Discharge Diagnosis: No diagnosis found  PTSD (post-traumatic stress disorder)     Criteria for Discharge: demonstrated failure to uphold their treatment plan/contract    Aftercare recommendations include (include specific referral names and phone numbers, if appropriate): NA    Prognosis: poor if not adhering to treatment goals and not committed to consistent therapy process

## 2023-06-07 NOTE — TELEPHONE ENCOUNTER
DISCHAR[place] [facility] [place]certified and regular) placed in outgoing mail on 06/07/23      Article #:  [id number]    Address:  [address] [address]

## 2023-07-07 ENCOUNTER — OFFICE VISIT (OUTPATIENT)
Dept: INTERNAL MEDICINE CLINIC | Facility: CLINIC | Age: 53
End: 2023-07-07
Payer: COMMERCIAL

## 2023-07-07 VITALS
HEIGHT: 65 IN | HEART RATE: 97 BPM | DIASTOLIC BLOOD PRESSURE: 69 MMHG | WEIGHT: 189 LBS | BODY MASS INDEX: 31.49 KG/M2 | SYSTOLIC BLOOD PRESSURE: 118 MMHG | TEMPERATURE: 98.5 F | OXYGEN SATURATION: 98 %

## 2023-07-07 DIAGNOSIS — G43.909 MIGRAINE WITHOUT STATUS MIGRAINOSUS, NOT INTRACTABLE, UNSPECIFIED MIGRAINE TYPE: ICD-10-CM

## 2023-07-07 DIAGNOSIS — S39.92XA TRAUMATIC INJURY OF BACK: ICD-10-CM

## 2023-07-07 DIAGNOSIS — K90.9 INTESTINAL MALABSORPTION, UNSPECIFIED TYPE: ICD-10-CM

## 2023-07-07 DIAGNOSIS — M54.15 RADICULOPATHY OF THORACOLUMBAR REGION: Primary | ICD-10-CM

## 2023-07-07 DIAGNOSIS — Z00.01 ENCOUNTER FOR GENERAL ADULT MEDICAL EXAMINATION WITH ABNORMAL FINDINGS: ICD-10-CM

## 2023-07-07 DIAGNOSIS — R27.0 ATAXIA: ICD-10-CM

## 2023-07-07 DIAGNOSIS — Z12.31 SCREENING MAMMOGRAM FOR HIGH-RISK PATIENT: ICD-10-CM

## 2023-07-07 PROCEDURE — 99213 OFFICE O/P EST LOW 20 MIN: CPT | Performed by: INTERNAL MEDICINE

## 2023-07-07 PROCEDURE — 99396 PREV VISIT EST AGE 40-64: CPT | Performed by: INTERNAL MEDICINE

## 2023-07-07 RX ORDER — MULTIVIT WITH MINERALS/LUTEIN
TABLET ORAL
COMMUNITY

## 2023-07-07 NOTE — PROGRESS NOTES
[facility] INTERNAL MEDICINE    NAME: Michael Roe  AGE: 50 y.o. SEX: female  : 1970     DATE: 2023     Assessment and Plan:     Problem List Items Addressed This Visit        Digestive    Intestinal malabsorption       Cardiovascular and Mediastinum    Migraine without status migrainosus, not intractable   Other Visit Diagnoses     Radiculopathy of thoracolumbar region    -  Primary    Relevant Orders    MRI thoracic spine w wo contrast    Traumatic injury of back        Relevant Orders    MRI thoracic spine w wo contrast    Ataxia        Relevant Orders    Ambulatory Referral to Neurology    Screening mammogram for high-risk patient        Relevant Orders    Mammo screening bilateral w 3d & cad    Encounter for general adult medical examination with abnormal findings              Immunizations and preventive care screenings were discussed with patient today. Appropriate education was printed on patient's after visit summary. Counseling:  Exercise: the importance of regular exercise/physical activity was discussed. Recommend exercise 3-5 times per week for at least 30 minutes. No follow-ups on file. Chief Complaint:     Chief Complaint   Patient presents with   • Back Pain     Patient is on her way - she is running a little late // back pain   • hm     Mammo due   • Physical Exam     Annual physical      History of Present Illness:     Adult Annual Physical   Patient here for a comprehensive physical exam. The patient reports problems - back pain. Diet and Physical Activity  Diet/Nutrition: well balanced diet and consuming 3-5 servings of fruits/vegetables daily. Exercise: walking and 30-60 minutes on average. Depression Screening  PHQ-2/9 Depression Screening         General Health  Sleep: gets 4-6 hours of sleep on average.    Hearing: normal - bilateral.  Vision: goes for regular eye exams, most recent eye exam <1 year ago and wears glasses. Dental: regular dental visits and brushes teeth twice daily. /GYN Health  Patient is: perimenopausal  Last menstrual period: May  Contraceptive method: None. Review of Systems:     Review of Systems   Constitutional: Negative for appetite change, chills, fatigue and fever. HENT: Negative for sore throat and trouble swallowing. Eyes: Negative for redness. Respiratory: Negative for shortness of breath. Cardiovascular: Negative for chest pain and palpitations. Gastrointestinal: Negative for abdominal pain, constipation and diarrhea. Genitourinary: Negative for dysuria and hematuria. Musculoskeletal: Positive for back pain and gait problem. Negative for neck pain. Skin: Negative for rash. Neurological: Positive for weakness. Negative for seizures and headaches. Hematological: Negative for adenopathy. Psychiatric/Behavioral: Negative for confusion. The patient is not nervous/anxious.        Past Medical History:     Past Medical History:   Diagnosis Date   • Asthma    • Colon polyp    • Gastroduodenitis    • Intestinal malabsorption    • Migraine    • Pyonephrosis    • UTI (urinary tract infection)    • Vitamin D deficiency       Past Surgical History:     Past Surgical History:   Procedure Laterality Date   • ABDOMINAL SURGERY      Twisted bowel and internal hernia   • CT CYSTOGRAM  5/23/2017   • CT CYSTOGRAM  5/23/2017   • DILATION AND CURETTAGE OF UTERUS  2017   • GASTRIC BYPASS  06/2008   • LAPAROSCOPIC GASTRIC BANDING  2005   • TUBAL LIGATION     • WISDOM TOOTH EXTRACTION        Social History:     Social History     Socioeconomic History   • Marital status: /Civil Union     Spouse name: None   • Number of children: None   • Years of education: None   • Highest education level: None   Occupational History   • None   Tobacco Use   • Smoking status: Former   • Smokeless tobacco: Never   • Tobacco comments:     Social smoker   Vaping Use • Vaping Use: Never used   Substance and Sexual Activity   • Alcohol use: Yes     Comment: occasional   • Drug use: Never   • Sexual activity: Not Currently   Other Topics Concern   • None   Social History Narrative    Most recent tobacco use screenin2017    Live alone or with others: with others    Marital status:     Are you currently employed: No    Alcohol intake:  Moderate     Social Determinants of Health     Financial Resource Strain: Not on file   Food Insecurity: Not on file   Transportation Needs: Not on file   Physical Activity: Not on file   Stress: Not on file   Social Connections: Not on file   Intimate Partner Violence: Not on file   Housing Stability: Not on file      Family History:     Family History   Problem Relation Age of Onset   • Thyroid disease Mother    • Heart disease Family    • Diabetes Family    • Hypertension Family    • Breast cancer Family    • Colon cancer Family    • Prostate cancer Family    • Gallbladder disease Family    • Stomach cancer Family    • Ovarian cancer Family    • Kidney cancer Family    • Lung cancer Family    • Thyroid disease Family       Current Medications:     Current Outpatient Medications   Medication Sig Dispense Refill   • Ascorbic Acid (vitamin C) 1000 MG tablet      • BIOTIN PO Take by mouth     • Butalbital-APAP-Caffeine (Fioricet) -40 MG CAPS Take 1 tablet by mouth daily as needed (migraine) 15 capsule 0   • CALCIUM CITRATE PO Take by mouth     • COLLAGEN PO      • cyanocobalamin ([employer] Vitamin B-12) 2000 MCG tablet      • Diclofenac Sodium (VOLTAREN) 1 % Apply 2 g topically 4 (four) times a day 100 g 1   • ergocalciferol (VITAMIN D2) 50,000 units Take 2 capsules (100,000 Units total) by mouth once a week 8 capsule 5   • ferrous sulfate 324 (65 Fe) mg TAKE 1 TABLET (324 MG TOTAL) BY MOUTH 2 (TWO) TIMES A DAY BEFORE MEALS 60 tablet 0   • Multiple Vitamin (Daily-Isra Multivitamin) TABS TAKE 1 TABLET BY MOUTH EVERY DAY 90 tablet 3   • VITAMIN E PO Take by mouth     • Vyvanse 60 MG capsule Take 60 mg by mouth daily       No current facility-administered medications for this visit. Allergies:     No Known Allergies   Physical Exam:     /69 (BP Location: Left arm, Patient Position: Sitting, Cuff Size: Large)   Pulse 97   Temp 98.5 °F (36.9 °C) (Temporal)   Ht 5' 5" (1.651 m)   Wt 85.7 kg (189 lb)   SpO2 98%   BMI 31.45 kg/m²     Physical Exam  Vitals and nursing note reviewed. Constitutional:       General: She is not in acute distress. Appearance: She is well-developed. She is obese. HENT:      Head: Normocephalic and atraumatic. Mouth/Throat:      Mouth: Mucous membranes are moist.   Eyes:      Conjunctiva/sclera: Conjunctivae normal.   Cardiovascular:      Rate and Rhythm: Normal rate and regular rhythm. Heart sounds: No murmur heard. Pulmonary:      Effort: Pulmonary effort is normal. No respiratory distress. Breath sounds: Normal breath sounds. Abdominal:      Palpations: Abdomen is soft. Tenderness: There is no abdominal tenderness. Musculoskeletal:         General: No swelling. Cervical back: Neck supple. Skin:     General: Skin is warm and dry. Capillary Refill: Capillary refill takes less than 2 seconds. Neurological:      Mental Status: She is alert and oriented to person, place, and time. Motor: Weakness present.       Gait: Gait abnormal.      Deep Tendon Reflexes: Reflexes abnormal.   Psychiatric:         Mood and Affect: Mood normal.          Opal Durbin MD  [address]

## 2023-07-07 NOTE — PROGRESS NOTES
Assessment/Plan:           1. Radiculopathy of thoracolumbar region  -     MRI thoracic spine w wo contrast; Future; Expected date: 07/07/2023    2. Traumatic injury of back  -     MRI thoracic spine w wo contrast; Future; Expected date: 07/07/2023    3. Ataxia  -     Ambulatory Referral to Neurology; Future    4. Intestinal malabsorption, unspecified type    5. Screening mammogram for high-risk patient  -     Mammo screening bilateral w 3d & cad; Future; Expected date: 07/07/2023    6. Migraine without status migrainosus, not intractable, unspecified migraine type    7. Encounter for general adult medical examination with abnormal findings           1. Intestinal malabsorption, unspecified type      2. Screening mammogram for high-risk patient    - Mammo screening bilateral w 3d & cad; Future    3. Migraine without status migrainosus, not intractable, unspecified migraine type         No problem-specific Assessment & Plan notes found for this encounter. Subjective:      Patient ID: Jaylen Castaneda is a 50 y.o. female. HPI    The following portions of the patient's history were reviewed and updated as appropriate: She  has a past medical history of Asthma, Colon polyp, Gastroduodenitis, Intestinal malabsorption, Migraine, Pyonephrosis, UTI (urinary tract infection), and Vitamin D deficiency.   She   Patient Active Problem List    Diagnosis Date Noted   • PTSD (post-traumatic stress disorder) 10/27/2022   • Concussion wth loss of consciousness of 30 minutes or less 10/26/2022   • Head trauma 10/26/2022   • Cellulitis of scalp 10/26/2022   • Fall down stairs 10/16/2022   • Back pain 10/26/2021   • Headache 10/26/2021   • Cervical strain, acute 10/26/2021   • Iron deficiency anemia 05/04/2021   • Migraine without status migrainosus, not intractable 05/04/2021   • Postoperative malabsorption 05/04/2021   • Intestinal malabsorption    • Asthma    • Vitamin D deficiency      She  has a past surgical history that includes Laparoscopic gastric banding (2005); Gastric bypass (06/2008); [place] tooth extraction; Dilation and curettage of uterus (2017); Abdominal surgery; Tubal ligation; CT cystogram (5/23/2017); and CT cystogram (5/23/2017). Her family history includes Breast cancer in her family; Colon cancer in her family; Diabetes in her family; Gallbladder disease in her family; Heart disease in her family; Hypertension in her family; Kidney cancer in her family; Lung cancer in her family; Ovarian cancer in her family; Prostate cancer in her family; Stomach cancer in her family; Thyroid disease in her family and mother. She  reports that she has quit smoking. She has never used smokeless tobacco. She reports current alcohol use. She reports that she does not use drugs. Current Outpatient Medications   Medication Sig Dispense Refill   • Ascorbic Acid (vitamin C) 1000 MG tablet      • BIOTIN PO Take by mouth     • Butalbital-APAP-Caffeine (Fioricet) -40 MG CAPS Take 1 tablet by mouth daily as needed (migraine) 15 capsule 0   • CALCIUM CITRATE PO Take by mouth     • COLLAGEN PO      • cyanocobalamin ([employer] Vitamin B-12) 2000 MCG tablet      • Diclofenac Sodium (VOLTAREN) 1 % Apply 2 g topically 4 (four) times a day 100 g 1   • ergocalciferol (VITAMIN D2) 50,000 units Take 2 capsules (100,000 Units total) by mouth once a week 8 capsule 5   • ferrous sulfate 324 (65 Fe) mg TAKE 1 TABLET (324 MG TOTAL) BY MOUTH 2 (TWO) TIMES A DAY BEFORE MEALS 60 tablet 0   • Multiple Vitamin (Daily-Isra Multivitamin) TABS TAKE 1 TABLET BY MOUTH EVERY DAY 90 tablet 3   • VITAMIN E PO Take by mouth     • Vyvanse 60 MG capsule Take 60 mg by mouth daily       No current facility-administered medications for this visit.      Current Outpatient Medications on File Prior to Visit   Medication Sig   • Ascorbic Acid (vitamin C) 1000 MG tablet    • BIOTIN PO Take by mouth   • Butalbital-APAP-Caffeine (Fioricet) -40 MG CAPS Take 1 tablet by mouth daily as needed (migraine)   • CALCIUM CITRATE PO Take by mouth   • COLLAGEN PO    • cyanocobalamin ([employer] Vitamin B-12) 2000 MCG tablet    • Diclofenac Sodium (VOLTAREN) 1 % Apply 2 g topically 4 (four) times a day   • ergocalciferol (VITAMIN D2) 50,000 units Take 2 capsules (100,000 Units total) by mouth once a week   • ferrous sulfate 324 (65 Fe) mg TAKE 1 TABLET (324 MG TOTAL) BY MOUTH 2 (TWO) TIMES A DAY BEFORE MEALS   • Multiple Vitamin (Daily-Isra Multivitamin) TABS TAKE 1 TABLET BY MOUTH EVERY DAY   • VITAMIN E PO Take by mouth   • Vyvanse 60 MG capsule Take 60 mg by mouth daily   • [DISCONTINUED] atorvastatin (LIPITOR) 80 mg tablet Take 80 mg by mouth daily   • [DISCONTINUED] clonazePAM (KlonoPIN) 2 mg tablet Take 2 mg by mouth 2 (two) times a day   • [DISCONTINUED] cloNIDine (CATAPRES) 0.1 mg tablet Take 0.1 mg by mouth daily Taking one daily  (Patient not taking: No sig reported)   • [DISCONTINUED] FOLIC ACID PO Take by mouth daily     • [DISCONTINUED] gabapentin (NEURONTIN) 800 mg tablet Take 800 mg by mouth 3 (three) times a day   • [DISCONTINUED] mirtazapine (REMERON) 45 MG tablet Take 45 mg by mouth daily at bedtime   • [DISCONTINUED] omeprazole (PriLOSEC) 20 mg delayed release capsule Take 20 mg by mouth daily   • [DISCONTINUED] traZODone (DESYREL) 100 mg tablet Take 100 mg by mouth daily at bedtime   • [DISCONTINUED] vitamin B-12 (VITAMIN B-12) 1,000 mcg tablet Take by mouth daily     No current facility-administered medications on file prior to visit. She has No Known Allergies. .    Review of Systems   Constitutional: Negative for appetite change, chills, fatigue and fever. HENT: Negative for sore throat and trouble swallowing. Eyes: Negative for redness. Respiratory: Negative for shortness of breath. Cardiovascular: Negative for chest pain and palpitations. Gastrointestinal: Negative for abdominal pain, constipation and diarrhea.    Genitourinary: Negative for dysuria and hematuria. Musculoskeletal: Positive for back pain and gait problem. Negative for neck pain. Skin: Negative for rash. Neurological: Positive for weakness. Negative for seizures and headaches. Hematological: Negative for adenopathy. Psychiatric/Behavioral: Negative for confusion. The patient is not nervous/anxious. Objective:      /69 (BP Location: Left arm, Patient Position: Sitting, Cuff Size: Large)   Pulse 97   Temp 98.5 °F (36.9 °C) (Temporal)   Ht 5' 5" (1.651 m)   Wt 85.7 kg (189 lb)   SpO2 98%   BMI 31.45 kg/m²     Results Reviewed     None          No results found for this or any previous visit (from the past 1344 hour(s)). Physical Exam  Constitutional:       General: She is not in acute distress. Appearance: Normal appearance. HENT:      Head: Normocephalic and atraumatic. Nose: Nose normal.      Mouth/Throat:      Mouth: Mucous membranes are moist.   Eyes:      Extraocular Movements: Extraocular movements intact. Pupils: Pupils are equal, round, and reactive to light. Cardiovascular:      Rate and Rhythm: Normal rate and regular rhythm. Pulses: Normal pulses. Heart sounds: Normal heart sounds. No murmur heard. No friction rub. Pulmonary:      Effort: Pulmonary effort is normal. No respiratory distress. Breath sounds: Normal breath sounds. No wheezing. Abdominal:      General: Abdomen is flat. Bowel sounds are normal. There is no distension. Palpations: Abdomen is soft. There is no mass. Tenderness: There is no abdominal tenderness. There is no guarding. Musculoskeletal:         General: Normal range of motion. Neurological:      Mental Status: She is alert. Cranial Nerves: No cranial nerve deficit. Motor: Weakness present.       Coordination: Coordination abnormal.      Gait: Gait abnormal.      Deep Tendon Reflexes: Reflexes abnormal.   Psychiatric:         Mood and Affect: Mood normal.         Behavior: Behavior normal.

## 2023-07-19 ENCOUNTER — HOSPITAL ENCOUNTER (OUTPATIENT)
Facility: MEDICAL CENTER | Age: 53
Discharge: HOME | End: 2023-07-19
Payer: COMMERCIAL

## 2023-07-19 DIAGNOSIS — M54.15 RADICULOPATHY OF THORACOLUMBAR REGION: ICD-10-CM

## 2023-07-19 DIAGNOSIS — S39.92XA TRAUMATIC INJURY OF BACK: ICD-10-CM

## 2023-07-19 PROCEDURE — A9585 GADOBUTROL INJECTION: HCPCS | Performed by: INTERNAL MEDICINE

## 2023-07-19 PROCEDURE — 72157 MRI CHEST SPINE W/O & W/DYE: CPT

## 2023-07-19 PROCEDURE — G1004 CDSM NDSC: HCPCS

## 2023-07-19 RX ADMIN — GADOBUTROL 8 ML: 604.72 INJECTION INTRAVENOUS at 16:19

## 2023-07-28 ENCOUNTER — OFFICE VISIT (OUTPATIENT)
Dept: INTERNAL MEDICINE CLINIC | Facility: CLINIC | Age: 53
End: 2023-07-28
Payer: COMMERCIAL

## 2023-07-28 VITALS
HEIGHT: 65 IN | OXYGEN SATURATION: 98 % | HEART RATE: 86 BPM | WEIGHT: 185 LBS | SYSTOLIC BLOOD PRESSURE: 105 MMHG | DIASTOLIC BLOOD PRESSURE: 63 MMHG | BODY MASS INDEX: 30.82 KG/M2 | TEMPERATURE: 98 F

## 2023-07-28 DIAGNOSIS — M54.6 CHRONIC MIDLINE THORACIC BACK PAIN: ICD-10-CM

## 2023-07-28 DIAGNOSIS — R23.3 EASY BRUISING: ICD-10-CM

## 2023-07-28 DIAGNOSIS — G89.29 CHRONIC MIDLINE THORACIC BACK PAIN: ICD-10-CM

## 2023-07-28 DIAGNOSIS — Z00.00 WELL ADULT EXAM: ICD-10-CM

## 2023-07-28 DIAGNOSIS — G43.909 MIGRAINE WITHOUT STATUS MIGRAINOSUS, NOT INTRACTABLE, UNSPECIFIED MIGRAINE TYPE: ICD-10-CM

## 2023-07-28 DIAGNOSIS — K90.9 INTESTINAL MALABSORPTION, UNSPECIFIED TYPE: Primary | ICD-10-CM

## 2023-07-28 PROCEDURE — 99214 OFFICE O/P EST MOD 30 MIN: CPT | Performed by: INTERNAL MEDICINE

## 2023-07-28 NOTE — PROGRESS NOTES
Assessment/Plan:           1. Intestinal malabsorption, unspecified type  Comments:  Blood work was ordered. Orders:  -     Ferritin; Future  -     Iron Saturation %; Future  -     Urinalysis with microscopic  -     TSH, 3rd generation; Future  -     Vitamin B12; Future  -     Vitamin D 25 hydroxy; Future  -     Vitamin A; Future  -     Vitamin B1 (Thiamine), Serum/Plasma, LC/MS/MS; Future  -     Folate; Future    2. Migraine without status migrainosus, not intractable, unspecified migraine type    3. Easy bruising  Comments: We will check platelet count. Orders:  -     Protime-INR; Future  -     APTT; Future  -     Protime-INR  -     APTT    4. Well adult exam  -     CBC and differential; Future  -     Comprehensive metabolic panel; Future  -     Lipid panel; Future    5. Chronic midline thoracic back pain  Comments:  Referred to pain management. MRI report was reviewed. Orders:  -     Ambulatory Referral to Pain Management; Future           1. Intestinal malabsorption, unspecified type    - Ferritin; Future  - Iron Saturation %; Future  - Urinalysis with microscopic  - TSH, 3rd generation; Future  - Vitamin B12; Future  - Vitamin D 25 hydroxy; Future  - Vitamin A; Future  - Vitamin B1 (Thiamine), Serum/Plasma, LC/MS/MS; Future  - Folate; Future    2. Migraine without status migrainosus, not intractable, unspecified migraine type      3. Easy bruising      4. Well adult exam    - CBC and differential; Future  - Comprehensive metabolic panel; Future  - Lipid panel; Future    5. Chronic midline thoracic back pain         No problem-specific Assessment & Plan notes found for this encounter. Subjective:      Patient ID: Jayeln Castaneda is a 50 y.o. female.     HPI    The following portions of the patient's history were reviewed and updated as appropriate: She  has a past medical history of Asthma, Colon polyp, Gastroduodenitis, Intestinal malabsorption, Migraine, Pyonephrosis, UTI (urinary tract infection), and Vitamin D deficiency. She   Patient Active Problem List    Diagnosis Date Noted   • PTSD (post-traumatic stress disorder) 10/27/2022   • Concussion wth loss of consciousness of 30 minutes or less 10/26/2022   • Head trauma 10/26/2022   • Cellulitis of scalp 10/26/2022   • Fall down stairs 10/16/2022   • Back pain 10/26/2021   • Headache 10/26/2021   • Cervical strain, acute 10/26/2021   • Iron deficiency anemia 05/04/2021   • Migraine without status migrainosus, not intractable 05/04/2021   • Postoperative malabsorption 05/04/2021   • Intestinal malabsorption    • Asthma    • Vitamin D deficiency      She  has a past surgical history that includes Laparoscopic gastric banding (2005); Gastric bypass (06/2008); [place] tooth extraction; Dilation and curettage of uterus (2017); Abdominal surgery; Tubal ligation; CT cystogram (5/23/2017); and CT cystogram (5/23/2017). Her family history includes Breast cancer in her family; Colon cancer in her family; Diabetes in her family; Gallbladder disease in her family; Heart disease in her family; Hypertension in her family; Kidney cancer in her family; Lung cancer in her family; Ovarian cancer in her family; Prostate cancer in her family; Stomach cancer in her family; Thyroid disease in her family and mother. She  reports that she has quit smoking. She has never used smokeless tobacco. She reports current alcohol use. She reports that she does not use drugs.   Current Outpatient Medications   Medication Sig Dispense Refill   • Ascorbic Acid (vitamin C) 1000 MG tablet      • BIOTIN PO Take by mouth     • Butalbital-APAP-Caffeine (Fioricet) -40 MG CAPS Take 1 tablet by mouth daily as needed (migraine) 15 capsule 0   • CALCIUM CITRATE PO Take by mouth     • COLLAGEN PO      • cyanocobalamin ([employer] Vitamin B-12) 2000 MCG tablet      • ergocalciferol (VITAMIN D2) 50,000 units Take 2 capsules (100,000 Units total) by mouth once a week 8 capsule 5   • ferrous sulfate 324 (65 Fe) mg TAKE 1 TABLET (324 MG TOTAL) BY MOUTH 2 (TWO) TIMES A DAY BEFORE MEALS 60 tablet 0   • Multiple Vitamin (Daily-Isra Multivitamin) TABS TAKE 1 TABLET BY MOUTH EVERY DAY 90 tablet 3   • VITAMIN E PO Take by mouth     • Vyvanse 60 MG capsule Take 60 mg by mouth daily     • Diclofenac Sodium (VOLTAREN) 1 % Apply 2 g topically 4 (four) times a day (Patient not taking: Reported on 7/28/2023) 100 g 1     No current facility-administered medications for this visit.      Current Outpatient Medications on File Prior to Visit   Medication Sig   • Ascorbic Acid (vitamin C) 1000 MG tablet    • BIOTIN PO Take by mouth   • Butalbital-APAP-Caffeine (Fioricet) -40 MG CAPS Take 1 tablet by mouth daily as needed (migraine)   • CALCIUM CITRATE PO Take by mouth   • COLLAGEN PO    • cyanocobalamin ([employer] Vitamin B-12) 2000 MCG tablet    • ergocalciferol (VITAMIN D2) 50,000 units Take 2 capsules (100,000 Units total) by mouth once a week   • ferrous sulfate 324 (65 Fe) mg TAKE 1 TABLET (324 MG TOTAL) BY MOUTH 2 (TWO) TIMES A DAY BEFORE MEALS   • Multiple Vitamin (Daily-Isra Multivitamin) TABS TAKE 1 TABLET BY MOUTH EVERY DAY   • VITAMIN E PO Take by mouth   • Vyvanse 60 MG capsule Take 60 mg by mouth daily   • Diclofenac Sodium (VOLTAREN) 1 % Apply 2 g topically 4 (four) times a day (Patient not taking: Reported on 7/28/2023)   • [DISCONTINUED] atorvastatin (LIPITOR) 80 mg tablet Take 80 mg by mouth daily   • [DISCONTINUED] clonazePAM (KlonoPIN) 2 mg tablet Take 2 mg by mouth 2 (two) times a day   • [DISCONTINUED] gabapentin (NEURONTIN) 800 mg tablet Take 800 mg by mouth 3 (three) times a day   • [DISCONTINUED] mirtazapine (REMERON) 45 MG tablet Take 45 mg by mouth daily at bedtime   • [DISCONTINUED] omeprazole (PriLOSEC) 20 mg delayed release capsule Take 20 mg by mouth daily   • [DISCONTINUED] traZODone (DESYREL) 100 mg tablet Take 100 mg by mouth daily at bedtime     No current facility-administered medications on file prior to visit. There are no discontinued medications. She has No Known Allergies. .    Review of Systems   Constitutional: Negative for appetite change, chills, fatigue and fever. HENT: Negative for sore throat and trouble swallowing. Eyes: Negative for redness. Respiratory: Negative for shortness of breath. Cardiovascular: Negative for chest pain and palpitations. Gastrointestinal: Negative for abdominal pain, constipation and diarrhea. Genitourinary: Negative for dysuria and hematuria. Musculoskeletal: Positive for back pain. Negative for neck pain. Skin: Negative for rash. Neurological: Negative for seizures, weakness and headaches. Hematological: Negative for adenopathy. Psychiatric/Behavioral: Negative for confusion. The patient is not nervous/anxious. Objective:      /63 (BP Location: Left arm, Patient Position: Sitting, Cuff Size: Large)   Pulse 86   Temp 98 °F (36.7 °C) (Temporal)   Ht 5' 5" (1.651 m)   Wt 83.9 kg (185 lb)   SpO2 98%   BMI 30.79 kg/m²     Results Reviewed     None          No results found for this or any previous visit (from the past 1344 hour(s)). Physical Exam  Constitutional:       General: She is not in acute distress. Appearance: Normal appearance. HENT:      Head: Normocephalic and atraumatic. Nose: Nose normal.      Mouth/Throat:      Mouth: Mucous membranes are moist.   Eyes:      Extraocular Movements: Extraocular movements intact. Pupils: Pupils are equal, round, and reactive to light. Cardiovascular:      Rate and Rhythm: Normal rate and regular rhythm. Pulses: Normal pulses. Heart sounds: Normal heart sounds. No murmur heard. No friction rub. Pulmonary:      Effort: Pulmonary effort is normal. No respiratory distress. Breath sounds: Normal breath sounds. No wheezing. Abdominal:      General: Abdomen is flat. Bowel sounds are normal. There is no distension. Palpations: Abdomen is soft. There is no mass. Tenderness: There is no abdominal tenderness. There is no guarding. Musculoskeletal:         General: Tenderness present. Normal range of motion. Cervical back: Normal range of motion and neck supple. Skin:     General: Skin is warm. Neurological:      General: No focal deficit present. Mental Status: She is alert and oriented to person, place, and time. Mental status is at baseline. Cranial Nerves: No cranial nerve deficit.    Psychiatric:         Mood and Affect: Mood normal.         Behavior: Behavior normal.

## 2023-08-09 LAB
25(OH)D3 SERPL-MCNC: 35 NG/ML (ref 30–100)
ALBUMIN SERPL-MCNC: 4.2 G/DL (ref 3.6–5.1)
ALBUMIN/GLOB SERPL: 1.6 (CALC) (ref 1–2.5)
ALP SERPL-CCNC: 93 U/L (ref 37–153)
ALT SERPL-CCNC: 15 U/L (ref 6–29)
APPEARANCE UR: CLEAR
AST SERPL-CCNC: 19 U/L (ref 10–35)
BACTERIA UR QL AUTO: ABNORMAL /HPF
BASOPHILS # BLD AUTO: 51 CELLS/UL (ref 0–200)
BASOPHILS NFR BLD AUTO: 1.1 %
BILIRUB SERPL-MCNC: 0.3 MG/DL (ref 0.2–1.2)
BILIRUB UR QL STRIP: NEGATIVE
BUN SERPL-MCNC: 10 MG/DL (ref 7–25)
BUN/CREAT SERPL: NORMAL (CALC) (ref 6–22)
CALCIUM SERPL-MCNC: 9.5 MG/DL (ref 8.6–10.4)
CHLORIDE SERPL-SCNC: 104 MMOL/L (ref 98–110)
CHOLEST SERPL-MCNC: 207 MG/DL
CHOLEST/HDLC SERPL: 2.5 (CALC)
CO2 SERPL-SCNC: 29 MMOL/L (ref 20–32)
COLOR UR: YELLOW
CREAT SERPL-MCNC: 0.68 MG/DL (ref 0.5–1.03)
EOSINOPHIL # BLD AUTO: 101 CELLS/UL (ref 15–500)
EOSINOPHIL NFR BLD AUTO: 2.2 %
ERYTHROCYTE [DISTWIDTH] IN BLOOD BY AUTOMATED COUNT: 12.2 % (ref 11–15)
FERRITIN SERPL-MCNC: 64 NG/ML (ref 16–232)
FOLATE SERPL-MCNC: >24 NG/ML
GFR/BSA.PRED SERPLBLD CYS-BASED-ARV: 106 ML/MIN/1.73M2
GLOBULIN SER CALC-MCNC: 2.6 G/DL (CALC) (ref 1.9–3.7)
GLUCOSE SERPL-MCNC: 92 MG/DL (ref 65–99)
GLUCOSE UR QL STRIP: NEGATIVE
HCT VFR BLD AUTO: 36.6 % (ref 35–45)
HDLC SERPL-MCNC: 84 MG/DL
HGB BLD-MCNC: 12.3 G/DL (ref 11.7–15.5)
HGB UR QL STRIP: ABNORMAL
HYALINE CASTS #/AREA URNS LPF: ABNORMAL /LPF
IRON SATN MFR SERPL: 23 % (CALC) (ref 16–45)
IRON SERPL-MCNC: 80 MCG/DL (ref 45–160)
KETONES UR QL STRIP: ABNORMAL
LDLC SERPL CALC-MCNC: 106 MG/DL (CALC)
LEUKOCYTE ESTERASE UR QL STRIP: NEGATIVE
LYMPHOCYTES # BLD AUTO: 1605 CELLS/UL (ref 850–3900)
LYMPHOCYTES NFR BLD AUTO: 34.9 %
MCH RBC QN AUTO: 30.9 PG (ref 27–33)
MCHC RBC AUTO-ENTMCNC: 33.6 G/DL (ref 32–36)
MCV RBC AUTO: 92 FL (ref 80–100)
MONOCYTES # BLD AUTO: 276 CELLS/UL (ref 200–950)
MONOCYTES NFR BLD AUTO: 6 %
NEUTROPHILS # BLD AUTO: 2567 CELLS/UL (ref 1500–7800)
NEUTROPHILS NFR BLD AUTO: 55.8 %
NITRITE UR QL STRIP: NEGATIVE
NONHDLC SERPL-MCNC: 123 MG/DL (CALC)
PH UR STRIP: 6.5 [PH] (ref 5–8)
PLATELET # BLD AUTO: 225 THOUSAND/UL (ref 140–400)
PMV BLD REES-ECKER: 12.1 FL (ref 7.5–12.5)
POTASSIUM SERPL-SCNC: 4.5 MMOL/L (ref 3.5–5.3)
PROT SERPL-MCNC: 6.8 G/DL (ref 6.1–8.1)
PROT UR QL STRIP: ABNORMAL
RBC # BLD AUTO: 3.98 MILLION/UL (ref 3.8–5.1)
RBC #/AREA URNS HPF: ABNORMAL /HPF
SODIUM SERPL-SCNC: 139 MMOL/L (ref 135–146)
SP GR UR STRIP: 1.03 (ref 1–1.03)
SQUAMOUS #/AREA URNS HPF: ABNORMAL /HPF
TIBC SERPL-MCNC: 343 MCG/DL (CALC) (ref 250–450)
TRIGL SERPL-MCNC: 84 MG/DL
TSH SERPL-ACNC: 2.53 MIU/L
VIT A SERPL-MCNC: 45 MCG/DL (ref 38–98)
VIT B1 BLD-SCNC: 126 NMOL/L (ref 78–185)
VIT B12 SERPL-MCNC: 1194 PG/ML (ref 200–1100)
WBC # BLD AUTO: 4.6 THOUSAND/UL (ref 3.8–10.8)
WBC #/AREA URNS HPF: ABNORMAL /HPF

## 2023-08-10 ENCOUNTER — TELEPHONE (OUTPATIENT)
Dept: INTERNAL MEDICINE CLINIC | Facility: CLINIC | Age: 53
End: 2023-08-10

## 2023-08-10 DIAGNOSIS — R31.9 HEMATURIA, UNSPECIFIED TYPE: Primary | ICD-10-CM

## 2023-08-10 NOTE — TELEPHONE ENCOUNTER
----- Message from Garrett Mai MD sent at 8/9/2023  8:52 PM EDT -----  Please tell her that there is some blood in the urine she needs to follow-up unless there is menstrual contamination.  ----- Message -----  From: Sheryle Slider Lab Results In  Sent: 8/9/2023   7:00 AM EDT  To: Garrett Mai MD

## 2023-08-11 NOTE — TELEPHONE ENCOUNTER
Patient called back she only has back pain, has no more menstrual cycles. Will need to repeat the UA.

## 2023-08-14 NOTE — TELEPHONE ENCOUNTER
Called back patient and gave her instructions for her urin test to be done if not using a [facility] facility to call back and we will get it to her another way 105-140 g pro (1.5-2.0 g/kg/IBW)  1;1 kcal for estimated fluid needs

## 2023-09-07 ENCOUNTER — OFFICE VISIT (OUTPATIENT)
Dept: INTERNAL MEDICINE CLINIC | Facility: CLINIC | Age: 53
End: 2023-09-07
Payer: COMMERCIAL

## 2023-09-07 VITALS
BODY MASS INDEX: 30.99 KG/M2 | OXYGEN SATURATION: 93 % | HEART RATE: 84 BPM | TEMPERATURE: 98.3 F | SYSTOLIC BLOOD PRESSURE: 120 MMHG | WEIGHT: 186 LBS | DIASTOLIC BLOOD PRESSURE: 84 MMHG | HEIGHT: 65 IN

## 2023-09-07 DIAGNOSIS — J01.00 ACUTE MAXILLARY SINUSITIS, RECURRENCE NOT SPECIFIED: Primary | ICD-10-CM

## 2023-09-07 PROCEDURE — 99213 OFFICE O/P EST LOW 20 MIN: CPT | Performed by: INTERNAL MEDICINE

## 2023-09-07 RX ORDER — AMOXICILLIN AND CLAVULANATE POTASSIUM 875; 125 MG/1; MG/1
1 TABLET, FILM COATED ORAL EVERY 12 HOURS SCHEDULED
Qty: 14 TABLET | Refills: 0 | Status: SHIPPED | OUTPATIENT
Start: 2023-09-07 | End: 2023-09-14

## 2023-09-07 RX ORDER — DEXTROMETHORPHAN HYDROBROMIDE AND PROMETHAZINE HYDROCHLORIDE 15; 6.25 MG/5ML; MG/5ML
5 SYRUP ORAL 4 TIMES DAILY PRN
Qty: 125 ML | Refills: 0 | Status: SHIPPED | OUTPATIENT
Start: 2023-09-07

## 2023-09-07 NOTE — PROGRESS NOTES
Assessment/Plan:    BMI Counseling: Body mass index is 30.95 kg/m². The BMI is above normal. Nutrition recommendations include decreasing portion sizes and encouraging healthy choices of fruits and vegetables. Rationale for BMI follow-up plan is due to patient being overweight or obese. Depression Screening and Follow-up Plan: Patient was screened for depression during today's encounter. They screened negative with a PHQ-2 score of 0.            1. Acute maxillary sinusitis, recurrence not specified  -     amoxicillin-clavulanate (AUGMENTIN) 875-125 mg per tablet; Take 1 tablet by mouth every 12 (twelve) hours for 7 days  -     promethazine-dextromethorphan (PHENERGAN-DM) 6.25-15 mg/5 mL oral syrup; Take 5 mL by mouth 4 (four) times a day as needed for cough           Subjective:      Patient ID: Maya Schmitt is a 50 y.o. female. Cough, dry, ear pain left side, sinus pressure      The following portions of the patient's history were reviewed and updated as appropriate: She  has a past medical history of Asthma, Colon polyp, Gastroduodenitis, Intestinal malabsorption, Migraine, Pyonephrosis, UTI (urinary tract infection), and Vitamin D deficiency. She   Patient Active Problem List    Diagnosis Date Noted   • Acute maxillary sinusitis 09/07/2023   • PTSD (post-traumatic stress disorder) 10/27/2022   • Concussion wth loss of consciousness of 30 minutes or less 10/26/2022   • Head trauma 10/26/2022   • Cellulitis of scalp 10/26/2022   • Fall down stairs 10/16/2022   • Back pain 10/26/2021   • Headache 10/26/2021   • Cervical strain, acute 10/26/2021   • Iron deficiency anemia 05/04/2021   • Migraine without status migrainosus, not intractable 05/04/2021   • Postoperative malabsorption 05/04/2021   • Intestinal malabsorption    • Asthma    • Vitamin D deficiency      She  has a past surgical history that includes Laparoscopic gastric banding (2005); Gastric bypass (06/2008);  [place] tooth extraction; Dilation and curettage of uterus (2017); Abdominal surgery; Tubal ligation; CT cystogram (5/23/2017); and CT cystogram (5/23/2017). Her family history includes Breast cancer in her family; Colon cancer in her family; Diabetes in her family; Gallbladder disease in her family; Heart disease in her family; Hypertension in her family; Kidney cancer in her family; Lung cancer in her family; Ovarian cancer in her family; Prostate cancer in her family; Stomach cancer in her family; Thyroid disease in her family and mother. She  reports that she has quit smoking. She has never used smokeless tobacco. She reports current alcohol use. She reports that she does not use drugs.   Current Outpatient Medications   Medication Sig Dispense Refill   • amoxicillin-clavulanate (AUGMENTIN) 875-125 mg per tablet Take 1 tablet by mouth every 12 (twelve) hours for 7 days 14 tablet 0   • Ascorbic Acid (vitamin C) 1000 MG tablet      • BIOTIN PO Take by mouth     • Butalbital-APAP-Caffeine (Fioricet) -40 MG CAPS Take 1 tablet by mouth daily as needed (migraine) 15 capsule 0   • CALCIUM CITRATE PO Take by mouth     • COLLAGEN PO      • cyanocobalamin ([facility] Vitamin B-12) 2000 MCG tablet      • ergocalciferol (VITAMIN D2) 50,000 units Take 2 capsules (100,000 Units total) by mouth once a week 8 capsule 5   • Multiple Vitamin (Daily-Isra Multivitamin) TABS TAKE 1 TABLET BY MOUTH EVERY DAY 90 tablet 3   • promethazine-dextromethorphan (PHENERGAN-DM) 6.25-15 mg/5 mL oral syrup Take 5 mL by mouth 4 (four) times a day as needed for cough 125 mL 0   • VITAMIN E PO Take by mouth     • Vyvanse 60 MG capsule Take 60 mg by mouth daily     • Diclofenac Sodium (VOLTAREN) 1 % Apply 2 g topically 4 (four) times a day (Patient not taking: Reported on 7/28/2023) 100 g 1   • ferrous sulfate 324 (65 Fe) mg TAKE 1 TABLET (324 MG TOTAL) BY MOUTH 2 (TWO) TIMES A DAY BEFORE MEALS (Patient not taking: Reported on 9/7/2023) 60 tablet 0     No current facility-administered medications for this visit. Current Outpatient Medications on File Prior to Visit   Medication Sig   • Ascorbic Acid (vitamin C) 1000 MG tablet    • BIOTIN PO Take by mouth   • Butalbital-APAP-Caffeine (Fioricet) -40 MG CAPS Take 1 tablet by mouth daily as needed (migraine)   • CALCIUM CITRATE PO Take by mouth   • COLLAGEN PO    • cyanocobalamin ([employer] Vitamin B-12) 2000 MCG tablet    • ergocalciferol (VITAMIN D2) 50,000 units Take 2 capsules (100,000 Units total) by mouth once a week   • Multiple Vitamin (Daily-Isra Multivitamin) TABS TAKE 1 TABLET BY MOUTH EVERY DAY   • VITAMIN E PO Take by mouth   • Vyvanse 60 MG capsule Take 60 mg by mouth daily   • Diclofenac Sodium (VOLTAREN) 1 % Apply 2 g topically 4 (four) times a day (Patient not taking: Reported on 7/28/2023)   • ferrous sulfate 324 (65 Fe) mg TAKE 1 TABLET (324 MG TOTAL) BY MOUTH 2 (TWO) TIMES A DAY BEFORE MEALS (Patient not taking: Reported on 9/7/2023)   • [DISCONTINUED] atorvastatin (LIPITOR) 80 mg tablet Take 80 mg by mouth daily   • [DISCONTINUED] clonazePAM (KlonoPIN) 2 mg tablet Take 2 mg by mouth 2 (two) times a day   • [DISCONTINUED] gabapentin (NEURONTIN) 800 mg tablet Take 800 mg by mouth 3 (three) times a day   • [DISCONTINUED] mirtazapine (REMERON) 45 MG tablet Take 45 mg by mouth daily at bedtime   • [DISCONTINUED] omeprazole (PriLOSEC) 20 mg delayed release capsule Take 20 mg by mouth daily   • [DISCONTINUED] traZODone (DESYREL) 100 mg tablet Take 100 mg by mouth daily at bedtime     No current facility-administered medications on file prior to visit. She has No Known Allergies. .    Review of Systems   Constitutional: Negative for chills and fever. HENT: Positive for ear pain. Negative for congestion and sore throat. Eyes: Negative for pain. Respiratory: Positive for cough. Negative for shortness of breath. Cardiovascular: Negative for chest pain and leg swelling.    Gastrointestinal: Negative for abdominal pain, nausea and vomiting. Endocrine: Negative for polyuria. Genitourinary: Negative for difficulty urinating, frequency and urgency. Musculoskeletal: Negative for arthralgias and back pain. Skin: Negative for rash. Neurological: Negative for weakness and headaches. Psychiatric/Behavioral: Negative for sleep disturbance. The patient is not nervous/anxious.           Objective:      /84 (BP Location: Left arm, Patient Position: Sitting, Cuff Size: Standard)   Pulse 84   Temp 98.3 °F (36.8 °C) (Temporal)   Ht 5' 5" (1.651 m)   Wt 84.4 kg (186 lb)   SpO2 93%   BMI 30.95 kg/m²     Recent Results (from the past 1344 hour(s))   Lipid panel    Collection Time: 08/02/23 11:15 AM   Result Value Ref Range    Total Cholesterol 207 (H) <200 mg/dL    HDL 84 > OR = 50 mg/dL    Triglycerides 84 <150 mg/dL    LDL Calculated 106 (H) mg/dL (calc)    Chol HDLC Ratio 2.5 <5.0 (calc)    Non-HDL Cholesterol 123 <130 mg/dL (calc)   TIBC    Collection Time: 08/02/23 11:15 AM   Result Value Ref Range    Iron, Serum 80 45 - 160 mcg/dL    Total Iron Binding Capacity (TIBC) 343 250 - 450 mcg/dL (calc)    Iron Saturation 23 16 - 45 % (calc)   Comprehensive metabolic panel    Collection Time: 08/02/23 11:15 AM   Result Value Ref Range    Glucose, Random 92 65 - 99 mg/dL    BUN 10 7 - 25 mg/dL    Creatinine 0.68 0.50 - 1.03 mg/dL    eGFR 106 > OR = 60 mL/min/1.73m2    SL AMB BUN/CREATININE RATIO SEE NOTE: 6 - 22 (calc)    Sodium 139 135 - 146 mmol/L    Potassium 4.5 3.5 - 5.3 mmol/L    Chloride 104 98 - 110 mmol/L    CO2 29 20 - 32 mmol/L    Calcium 9.5 8.6 - 10.4 mg/dL    Protein, Total 6.8 6.1 - 8.1 g/dL    Albumin 4.2 3.6 - 5.1 g/dL    Globulin 2.6 1.9 - 3.7 g/dL (calc)    Albumin/Globulin Ratio 1.6 1.0 - 2.5 (calc)    TOTAL BILIRUBIN 0.3 0.2 - 1.2 mg/dL    Alkaline Phosphatase 93 37 - 153 U/L    AST 19 10 - 35 U/L    ALT 15 6 - 29 U/L   Vitamin A    Collection Time: 08/02/23 11:15 AM   Result Value Ref Range    Vitamin A 45 38 - 98 mcg/dL   Vitamin B1, whole blood    Collection Time: 08/02/23 11:15 AM   Result Value Ref Range    Vitamin B1, Whole Blood 126 78 - 185 nmol/L   Urinalysis with microscopic    Collection Time: 08/02/23 11:15 AM   Result Value Ref Range    Color UA YELLOW YELLOW    Urine Appearance CLEAR CLEAR    Specific Gravity 1.027 1.001 - 1.035    Ph 6.5 5.0 - 8.0    Glucose, Urine NEGATIVE NEGATIVE    Bilirubin, Urine NEGATIVE NEGATIVE    Ketone, Urine TRACE (A) NEGATIVE    Blood, Urine 2+ (A) NEGATIVE    Protein, Urine TRACE (A) NEGATIVE    Nitrites Urine NEGATIVE NEGATIVE    Leukocyte Esterase NEGATIVE NEGATIVE    SL AMB WBC, URINE NONE SEEN < OR = 5 /HPF    RBC, Urine 20-40 (A) < OR = 2 /HPF    Squamous Epithelial Cells 0-5 < OR = 5 /HPF    Bacteria, UA NONE SEEN NONE SEEN /HPF    Hyaline Casts NONE SEEN NONE SEEN /LPF    Comment(s)     CBC and differential    Collection Time: 08/02/23 11:15 AM   Result Value Ref Range    White Blood Cell Count 4.6 3.8 - 10.8 Thousand/uL    Red Blood Cell Count 3.98 3.80 - 5.10 Million/uL    Hemoglobin 12.3 11.7 - 15.5 g/dL    HCT 36.6 35.0 - 45.0 %    MCV 92.0 80.0 - 100.0 fL    MCH 30.9 27.0 - 33.0 pg    MCHC 33.6 32.0 - 36.0 g/dL    RDW 12.2 11.0 - 15.0 %    Platelet Count 502 287 - 400 Thousand/uL    SL AMB MPV 12.1 7.5 - 12.5 fL    Neutrophils (Absolute) 2,567 1,500 - 7,800 cells/uL    Lymphocytes (Absolute) 1,605 850 - 3,900 cells/uL    Monocytes (Absolute) 276 200 - 950 cells/uL    Eosinophils (Absolute) 101 15 - 500 cells/uL    Basophils ABS 51 0 - 200 cells/uL    Neutrophils 55.8 %    Lymphocytes 34.9 %    Monocytes 6.0 %    Eosinophils 2.2 %    Basophils PCT 1.1 %   Ferritin    Collection Time: 08/02/23 11:15 AM   Result Value Ref Range    Ferritin 64 16 - 232 ng/mL   Folate    Collection Time: 08/02/23 11:15 AM   Result Value Ref Range    FOLATE, SERUM >24.0 ng/mL   TSH, 3rd generation    Collection Time: 08/02/23 11:15 AM   Result Value Ref Range    TSH 2.53 mIU/L   Vitamin B12 Collection Time: 08/02/23 11:15 AM   Result Value Ref Range    Vitamin B-12 1,194 (H) 200 - 1,100 pg/mL   Vitamin D 25 hydroxy    Collection Time: 08/02/23 11:15 AM   Result Value Ref Range    Vitamin D, 25-Hydroxy, Serum 35 30 - 100 ng/mL        Physical Exam  Constitutional:       Appearance: Normal appearance. HENT:      Head: Normocephalic. Right Ear: Tympanic membrane, ear canal and external ear normal.      Left Ear: Ear canal and external ear normal.      Ears:      Comments: Left TM congestion present     Nose: Nose normal. No congestion. Mouth/Throat:      Mouth: Mucous membranes are moist.      Pharynx: Oropharynx is clear. No oropharyngeal exudate or posterior oropharyngeal erythema. Eyes:      Extraocular Movements: Extraocular movements intact. Conjunctiva/sclera: Conjunctivae normal.   Cardiovascular:      Rate and Rhythm: Normal rate and regular rhythm. Heart sounds: Normal heart sounds. No murmur heard. Pulmonary:      Effort: Pulmonary effort is normal.      Breath sounds: Normal breath sounds. No wheezing or rales. Abdominal:      General: Bowel sounds are normal. There is no distension. Palpations: Abdomen is soft. Tenderness: There is no abdominal tenderness. Musculoskeletal:      Cervical back: Normal range of motion and neck supple. Right lower leg: No edema. Left lower leg: No edema. Lymphadenopathy:      Cervical: No cervical adenopathy. Skin:     General: Skin is warm. Neurological:      General: No focal deficit present. Mental Status: She is alert and oriented to person, place, and time.

## 2023-11-06 PROBLEM — J01.00 ACUTE MAXILLARY SINUSITIS: Status: RESOLVED | Noted: 2023-09-07 | Resolved: 2023-11-06

## 2023-12-13 ENCOUNTER — OFFICE VISIT (OUTPATIENT)
Dept: INTERNAL MEDICINE CLINIC | Facility: CLINIC | Age: 53
End: 2023-12-13
Payer: COMMERCIAL

## 2023-12-13 VITALS
SYSTOLIC BLOOD PRESSURE: 122 MMHG | TEMPERATURE: 97.8 F | DIASTOLIC BLOOD PRESSURE: 80 MMHG | OXYGEN SATURATION: 98 % | WEIGHT: 178 LBS | HEART RATE: 60 BPM | HEIGHT: 65 IN | BODY MASS INDEX: 29.66 KG/M2

## 2023-12-13 DIAGNOSIS — D50.9 IRON DEFICIENCY ANEMIA, UNSPECIFIED IRON DEFICIENCY ANEMIA TYPE: ICD-10-CM

## 2023-12-13 DIAGNOSIS — M54.50 ACUTE RIGHT-SIDED LOW BACK PAIN WITHOUT SCIATICA: ICD-10-CM

## 2023-12-13 DIAGNOSIS — K91.2 POSTOPERATIVE MALABSORPTION: Primary | ICD-10-CM

## 2023-12-13 DIAGNOSIS — R30.0 DYSURIA: ICD-10-CM

## 2023-12-13 LAB
SL AMB  POCT GLUCOSE, UA: NORMAL
SL AMB LEUKOCYTE ESTERASE,UA: NORMAL
SL AMB POCT BILIRUBIN,UA: NORMAL
SL AMB POCT BLOOD,UA: NORMAL
SL AMB POCT CLARITY,UA: CLEAR
SL AMB POCT COLOR,UA: NORMAL
SL AMB POCT KETONES,UA: NORMAL
SL AMB POCT NITRITE,UA: NORMAL
SL AMB POCT PH,UA: 6
SL AMB POCT SPECIFIC GRAVITY,UA: 1.03
SL AMB POCT URINE PROTEIN: NORMAL
SL AMB POCT UROBILINOGEN: NORMAL

## 2023-12-13 PROCEDURE — 99214 OFFICE O/P EST MOD 30 MIN: CPT | Performed by: INTERNAL MEDICINE

## 2023-12-13 PROCEDURE — 81003 URINALYSIS AUTO W/O SCOPE: CPT | Performed by: INTERNAL MEDICINE

## 2023-12-13 RX ORDER — CEPHALEXIN 500 MG/1
500 CAPSULE ORAL EVERY 6 HOURS SCHEDULED
Qty: 12 CAPSULE | Refills: 0 | Status: SHIPPED | OUTPATIENT
Start: 2023-12-13 | End: 2023-12-16

## 2023-12-13 NOTE — PROGRESS NOTES
Assessment/Plan:           1. Postoperative malabsorption  Comments:  Continue supplementation. Fecal occult blood testing ordered. Orders:  -     Fecal Globin By Immunochemistry; Future    2. Dysuria  Comments:  Rule out pyelonephritis. Cephalexin and urine culture ordered. Orders:  -     POCT urine dip  -     cephalexin (KEFLEX) 500 mg capsule; Take 1 capsule (500 mg total) by mouth every 6 (six) hours for 3 days  -     Urine culture  -     Urine culture; Future  -     Urine culture    3. Acute right-sided low back pain without sciatica  -     Urine culture; Future  -     Urine culture    4. Iron deficiency anemia, unspecified iron deficiency anemia type  Comments: Will check blood work. 1. Postoperative malabsorption    - Fecal Globin By Immunochemistry; Future    2. Dysuria    - POCT urine dip  - cephalexin (KEFLEX) 500 mg capsule; Take 1 capsule (500 mg total) by mouth every 6 (six) hours for 3 days  Dispense: 12 capsule; Refill: 0    3. Acute right-sided low back pain without sciatica         No problem-specific Assessment & Plan notes found for this encounter. Subjective:      Patient ID: Yeni Rayo is a 48 y.o. female. HPI    The following portions of the patient's history were reviewed and updated as appropriate: She  has a past medical history of Asthma, Colon polyp, Gastroduodenitis, Headache(784.0) (10 years ago), Intestinal malabsorption, Migraine, Pyonephrosis, UTI (urinary tract infection), and Vitamin D deficiency.   She   Patient Active Problem List    Diagnosis Date Noted    PTSD (post-traumatic stress disorder) 10/27/2022    Concussion wth loss of consciousness of 30 minutes or less 10/26/2022    Head trauma 10/26/2022    Cellulitis of scalp 10/26/2022    Fall down stairs 10/16/2022    Back pain 10/26/2021    Headache 10/26/2021    Cervical strain, acute 10/26/2021    Iron deficiency anemia 05/04/2021    Migraine without status migrainosus, not intractable 05/04/2021 Postoperative malabsorption 05/04/2021    Intestinal malabsorption     Asthma     Vitamin D deficiency      She  has a past surgical history that includes Laparoscopic gastric banding (2005); Gastric bypass (06/2008); [place] tooth extraction; Dilation and curettage of uterus (2017); Abdominal surgery; Tubal ligation; CT cystogram (05/23/2017); CT cystogram (05/23/2017); and Small intestine surgery (2008). Her family history includes Arthritis in her mother; Asthma in her brother and son; Breast cancer in her family and maternal aunt; COPD in her mother; Cancer in her brother, maternal aunt, maternal aunt, maternal aunt, maternal aunt, maternal aunt, maternal grandfather, maternal uncle, and maternal uncle; Colon cancer in her family, maternal aunt, and maternal uncle; Coronary artery disease in her mother; Diabetes in her family and father; Gallbladder disease in her family; Glaucoma in her father; Heart disease in her family and maternal grandmother; Hypertension in her family and mother; Kidney cancer in her family; Lung cancer in her family; Ovarian cancer in her family; Prostate cancer in her family and maternal grandfather; Stomach cancer in her family; Thyroid disease in her family and mother. She  reports that she has been smoking cigarettes. She has never used smokeless tobacco. She reports current alcohol use. She reports that she does not use drugs.   Current Outpatient Medications   Medication Sig Dispense Refill    Ascorbic Acid (vitamin C) 1000 MG tablet       BIOTIN PO Take by mouth      Butalbital-APAP-Caffeine (Fioricet) -40 MG CAPS Take 1 tablet by mouth daily as needed (migraine) 15 capsule 0    CALCIUM CITRATE PO Take by mouth      cephalexin (KEFLEX) 500 mg capsule Take 1 capsule (500 mg total) by mouth every 6 (six) hours for 3 days 12 capsule 0    COLLAGEN PO       cyanocobalamin ([employer] Vitamin B-12) 2000 MCG tablet       ergocalciferol (VITAMIN D2) 50,000 units Take 2 capsules (100,000 Units total) by mouth once a week 8 capsule 5    Multiple Vitamin (Daily-[facility]ra Multivitamin) TABS TAKE 1 TABLET BY MOUTH EVERY DAY 90 tablet 3    promethazine-dextromethorphan (PHENERGAN-DM) 6.25-15 mg/5 mL oral syrup Take 5 mL by mouth 4 (four) times a day as needed for cough 125 mL 0    VITAMIN E PO Take by mouth      Vyvanse 60 MG capsule Take 60 mg by mouth daily      Diclofenac Sodium (VOLTAREN) 1 % Apply 2 g topically 4 (four) times a day (Patient not taking: Reported on 7/28/2023) 100 g 1    ferrous sulfate 324 (65 Fe) mg TAKE 1 TABLET (324 MG TOTAL) BY MOUTH 2 (TWO) TIMES A DAY BEFORE MEALS (Patient not taking: Reported on 9/7/2023) 60 tablet 0     No current facility-administered medications for this visit.      Current Outpatient Medications on File Prior to Visit   Medication Sig    Ascorbic Acid (vitamin C) 1000 MG tablet     BIOTIN PO Take by mouth    Butalbital-APAP-Caffeine (Fioricet) -40 MG CAPS Take 1 tablet by mouth daily as needed (migraine)    CALCIUM CITRATE PO Take by mouth    COLLAGEN PO     cyanocobalamin ([employer] Vitamin B-12) 2000 MCG tablet     ergocalciferol (VITAMIN D2) 50,000 units Take 2 capsules (100,000 Units total) by mouth once a week    Multiple Vitamin (Daily-Isra Multivitamin) TABS TAKE 1 TABLET BY MOUTH EVERY DAY    promethazine-dextromethorphan (PHENERGAN-DM) 6.25-15 mg/5 mL oral syrup Take 5 mL by mouth 4 (four) times a day as needed for cough    VITAMIN E PO Take by mouth    Vyvanse 60 MG capsule Take 60 mg by mouth daily    Diclofenac Sodium (VOLTAREN) 1 % Apply 2 g topically 4 (four) times a day (Patient not taking: Reported on 7/28/2023)    ferrous sulfate 324 (65 Fe) mg TAKE 1 TABLET (324 MG TOTAL) BY MOUTH 2 (TWO) TIMES A DAY BEFORE MEALS (Patient not taking: Reported on 9/7/2023)    [DISCONTINUED] atorvastatin (LIPITOR) 80 mg tablet Take 80 mg by mouth daily    [DISCONTINUED] clonazePAM (KlonoPIN) 2 mg tablet Take 2 mg by mouth 2 (two) times a day    [DISCONTINUED] gabapentin (NEURONTIN) 800 mg tablet Take 800 mg by mouth 3 (three) times a day    [DISCONTINUED] mirtazapine (REMERON) 45 MG tablet Take 45 mg by mouth daily at bedtime    [DISCONTINUED] omeprazole (PriLOSEC) 20 mg delayed release capsule Take 20 mg by mouth daily    [DISCONTINUED] traZODone (DESYREL) 100 mg tablet Take 100 mg by mouth daily at bedtime     No current facility-administered medications on file prior to visit. There are no discontinued medications. She has No Known Allergies. .    Review of Systems   Constitutional:  Negative for appetite change, chills, fatigue and fever. HENT:  Negative for sore throat and trouble swallowing. Eyes:  Negative for redness. Respiratory:  Negative for shortness of breath. Cardiovascular:  Negative for chest pain and palpitations. Gastrointestinal:  Negative for abdominal pain, constipation and diarrhea. Genitourinary:  Negative for dysuria and hematuria. Musculoskeletal:  Negative for back pain and neck pain. Skin:  Negative for rash. Neurological:  Negative for seizures, weakness and headaches. Hematological:  Negative for adenopathy. Psychiatric/Behavioral:  Negative for confusion. The patient is not nervous/anxious.           Objective:      /80 (BP Location: Left arm, Patient Position: Sitting, Cuff Size: Standard)   Pulse 60   Temp 97.8 °F (36.6 °C) (Temporal)   Ht 5' 5" (1.651 m)   Wt 80.7 kg (178 lb)   SpO2 98%   BMI 29.62 kg/m²     Results Reviewed       None            Recent Results (from the past 1344 hour(s))   POCT urine dip    Collection Time: 12/13/23  4:34 PM   Result Value Ref Range    LEUKOCYTE ESTERASE,UA neg     NITRITE,UA neg     SL AMB POCT UROBILINOGEN neg     POCT URINE PROTEIN neg      PH,UA 6.0     BLOOD,UA 2+     SPECIFIC GRAVITY,UA 1.030     KETONES,UA neg     BILIRUBIN,UA neg     GLUCOSE, UA neg      COLOR,UA gold     CLARITY,UA clear         Physical Exam  Constitutional:       General: She is not in acute distress. Appearance: Normal appearance. HENT:      Head: Normocephalic and atraumatic. Nose: Nose normal.      Mouth/Throat:      Mouth: Mucous membranes are moist.   Eyes:      Extraocular Movements: Extraocular movements intact. Pupils: Pupils are equal, round, and reactive to light. Cardiovascular:      Rate and Rhythm: Normal rate and regular rhythm. Pulses: Normal pulses. Heart sounds: Normal heart sounds. No murmur heard. No friction rub. Pulmonary:      Effort: Pulmonary effort is normal. No respiratory distress. Breath sounds: Normal breath sounds. No wheezing. Abdominal:      General: Abdomen is flat. Bowel sounds are normal. There is no distension. Palpations: Abdomen is soft. There is no mass. Tenderness: There is no abdominal tenderness. There is no guarding. Musculoskeletal:         General: Normal range of motion. Neurological:      General: No focal deficit present. Mental Status: She is alert and oriented to person, place, and time. Mental status is at baseline. Cranial Nerves: No cranial nerve deficit.    Psychiatric:         Mood and Affect: Mood normal.         Behavior: Behavior normal.

## 2024-01-05 LAB — HBA1C MFR BLD HPLC: 5.7 %

## 2024-01-22 ENCOUNTER — VBI (OUTPATIENT)
Dept: ADMINISTRATIVE | Facility: OTHER | Age: 54
End: 2024-01-22

## 2024-01-31 ENCOUNTER — OFFICE VISIT (OUTPATIENT)
Dept: INTERNAL MEDICINE CLINIC | Facility: CLINIC | Age: 54
End: 2024-01-31
Payer: COMMERCIAL

## 2024-01-31 VITALS
BODY MASS INDEX: 30.45 KG/M2 | WEIGHT: 183 LBS | HEART RATE: 75 BPM | OXYGEN SATURATION: 99 % | DIASTOLIC BLOOD PRESSURE: 62 MMHG | SYSTOLIC BLOOD PRESSURE: 94 MMHG | TEMPERATURE: 97.6 F

## 2024-01-31 DIAGNOSIS — H93.12 TINNITUS OF LEFT EAR: ICD-10-CM

## 2024-01-31 DIAGNOSIS — G43.909 MIGRAINE WITHOUT STATUS MIGRAINOSUS, NOT INTRACTABLE, UNSPECIFIED MIGRAINE TYPE: ICD-10-CM

## 2024-01-31 DIAGNOSIS — S03.40XA SPRAIN OF TEMPOROMANDIBULAR JOINT, INITIAL ENCOUNTER: Primary | ICD-10-CM

## 2024-01-31 DIAGNOSIS — K90.9 INTESTINAL MALABSORPTION, UNSPECIFIED TYPE: ICD-10-CM

## 2024-01-31 DIAGNOSIS — R73.01 IMPAIRED FASTING BLOOD SUGAR: ICD-10-CM

## 2024-01-31 DIAGNOSIS — E66.09 CLASS 1 OBESITY DUE TO EXCESS CALORIES WITHOUT SERIOUS COMORBIDITY WITH BODY MASS INDEX (BMI) OF 30.0 TO 30.9 IN ADULT: ICD-10-CM

## 2024-01-31 PROCEDURE — 99214 OFFICE O/P EST MOD 30 MIN: CPT | Performed by: INTERNAL MEDICINE

## 2024-01-31 RX ORDER — CYCLOBENZAPRINE HCL 10 MG
10 TABLET ORAL 3 TIMES DAILY PRN
Qty: 15 TABLET | Refills: 0 | Status: SHIPPED | OUTPATIENT
Start: 2024-01-31

## 2024-01-31 RX ORDER — ERGOCALCIFEROL 1.25 MG/1
50000 CAPSULE ORAL WEEKLY
Qty: 13 CAPSULE | Refills: 3 | Status: SHIPPED | OUTPATIENT
Start: 2024-01-31 | End: 2024-04-30

## 2024-01-31 RX ORDER — MULTIVITAMIN WITH FOLIC ACID 400 MCG
1 TABLET ORAL DAILY
Qty: 90 TABLET | Refills: 3 | Status: SHIPPED | OUTPATIENT
Start: 2024-01-31

## 2024-01-31 NOTE — PROGRESS NOTES
Assessment/Plan:           1. Sprain of temporomandibular joint, initial encounter  Comments:  Flexeril advised.  She will obtain mouthguard.  Orders:  -     cyclobenzaprine (FLEXERIL) 10 mg tablet; Take 1 tablet (10 mg total) by mouth 3 (three) times a day as needed for muscle spasms    2. Class 1 obesity due to excess calories without serious comorbidity with body mass index (BMI) of 30.0 to 30.9 in adult  Comments:  Referred to weight management.  Orders:  -     Ambulatory Referral to Weight Management; Future    3. Migraine without status migrainosus, not intractable, unspecified migraine type  Comments:  Continue current management.    4. Impaired fasting blood sugar    5. Intestinal malabsorption, unspecified type  -     ergocalciferol (VITAMIN D2) 50,000 units; Take 1 capsule (50,000 Units total) by mouth once a week  -     Multiple Vitamin (Daily-Isra Multivitamin) TABS; Take 1 tablet by mouth daily    6. Tinnitus of left ear  Comments:  ENT follow-up advised.  Orders:  -     Ambulatory Referral to Otolaryngology; Future           1. Sprain of temporomandibular joint, initial encounter      2. Class 1 obesity due to excess calories without serious comorbidity with body mass index (BMI) of 30.0 to 30.9 in adult    - Ambulatory Referral to Weight Management; Future    3. Migraine without status migrainosus, not intractable, unspecified migraine type      4. Impaired fasting blood sugar      5. Intestinal malabsorption, unspecified type    - ergocalciferol (VITAMIN D2) 50,000 units; Take 1 capsule (50,000 Units total) by mouth once a week  Dispense: 13 capsule; Refill: 3  - Multiple Vitamin (Daily-Isra Multivitamin) TABS; Take 1 tablet by mouth daily  Dispense: 90 tablet; Refill: 3    6. Tinnitus of left ear    - Ambulatory Referral to Otolaryngology; Future       No problem-specific Assessment & Plan notes found for this encounter.           Subjective:      Patient ID: Laure Gaxiola is a 53 y.o.  female.    HPI    The following portions of the patient's history were reviewed and updated as appropriate: She  has a past medical history of Asthma, Colon polyp, Gastroduodenitis, Headache(784.0) (10 years ago), Intestinal malabsorption, Migraine, Pyonephrosis, UTI (urinary tract infection), and Vitamin D deficiency.  She   Patient Active Problem List    Diagnosis Date Noted    PTSD (post-traumatic stress disorder) 10/27/2022    Concussion wth loss of consciousness of 30 minutes or less 10/26/2022    Head trauma 10/26/2022    Cellulitis of scalp 10/26/2022    Fall down stairs 10/16/2022    Back pain 10/26/2021    Headache 10/26/2021    Cervical strain, acute 10/26/2021    Iron deficiency anemia 05/04/2021    Migraine without status migrainosus, not intractable 05/04/2021    Postoperative malabsorption 05/04/2021    Intestinal malabsorption     Asthma     Vitamin D deficiency      She  has a past surgical history that includes Laparoscopic gastric banding (2005); Gastric bypass (06/2008); [place] tooth extraction; Dilation and curettage of uterus (2017); Abdominal surgery; Tubal ligation; CT cystogram (05/23/2017); CT cystogram (05/23/2017); and Small intestine surgery (2008).  Her family history includes Arthritis in her mother; Asthma in her brother and son; Breast cancer in her family and maternal aunt; COPD in her mother; Cancer in her brother, maternal aunt, maternal aunt, maternal aunt, maternal aunt, maternal aunt, maternal grandfather, maternal uncle, and maternal uncle; Colon cancer in her family, maternal aunt, and maternal uncle; Coronary artery disease in her mother; Diabetes in her family and father; Gallbladder disease in her family; Glaucoma in her father; Heart disease in her family and maternal grandmother; Hypertension in her family and mother; Kidney cancer in her family; Lung cancer in her family; Ovarian cancer in her family; Prostate cancer in her family and maternal grandfather; Stomach cancer  in her family; Thyroid disease in her family and mother.  She  reports that she has been smoking cigarettes. She has never used smokeless tobacco. She reports current alcohol use. She reports that she does not use drugs.  Current Outpatient Medications   Medication Sig Dispense Refill    Ascorbic Acid (vitamin C) 1000 MG tablet       BIOTIN PO Take by mouth      Butalbital-APAP-Caffeine (Fioricet) -40 MG CAPS Take 1 tablet by mouth daily as needed (migraine) 15 capsule 0    CALCIUM CITRATE PO Take by mouth      COLLAGEN PO       cyanocobalamin ([employer] Vitamin B-12) 2000 MCG tablet       cyclobenzaprine (FLEXERIL) 10 mg tablet Take 1 tablet (10 mg total) by mouth 3 (three) times a day as needed for muscle spasms 15 tablet 0    ergocalciferol (VITAMIN D2) 50,000 units Take 1 capsule (50,000 Units total) by mouth once a week 13 capsule 3    Multiple Vitamin (Daily-Isra Multivitamin) TABS Take 1 tablet by mouth daily 90 tablet 3    VITAMIN E PO Take by mouth      Vyvanse 60 MG capsule Take 60 mg by mouth daily      ferrous sulfate 324 (65 Fe) mg TAKE 1 TABLET (324 MG TOTAL) BY MOUTH 2 (TWO) TIMES A DAY BEFORE MEALS (Patient not taking: Reported on 9/7/2023) 60 tablet 0    promethazine-dextromethorphan (PHENERGAN-DM) 6.25-15 mg/5 mL oral syrup Take 5 mL by mouth 4 (four) times a day as needed for cough (Patient not taking: Reported on 1/31/2024) 125 mL 0     No current facility-administered medications for this visit.     Current Outpatient Medications on File Prior to Visit   Medication Sig    Ascorbic Acid (vitamin C) 1000 MG tablet     BIOTIN PO Take by mouth    Butalbital-APAP-Caffeine (Fioricet) -40 MG CAPS Take 1 tablet by mouth daily as needed (migraine)    CALCIUM CITRATE PO Take by mouth    COLLAGEN PO     cyanocobalamin ([employer] Vitamin B-12) 2000 MCG tablet     VITAMIN E PO Take by mouth    Vyvanse 60 MG capsule Take 60 mg by mouth daily    [DISCONTINUED] ergocalciferol (VITAMIN D2) 50,000 units Take 2  capsules (100,000 Units total) by mouth once a week    [DISCONTINUED] Multiple Vitamin (Daily-Isra Multivitamin) TABS TAKE 1 TABLET BY MOUTH EVERY DAY    ferrous sulfate 324 (65 Fe) mg TAKE 1 TABLET (324 MG TOTAL) BY MOUTH 2 (TWO) TIMES A DAY BEFORE MEALS (Patient not taking: Reported on 9/7/2023)    promethazine-dextromethorphan (PHENERGAN-DM) 6.25-15 mg/5 mL oral syrup Take 5 mL by mouth 4 (four) times a day as needed for cough (Patient not taking: Reported on 1/31/2024)    [DISCONTINUED] atorvastatin (LIPITOR) 80 mg tablet Take 80 mg by mouth daily    [DISCONTINUED] clonazePAM (KlonoPIN) 2 mg tablet Take 2 mg by mouth 2 (two) times a day    [DISCONTINUED] Diclofenac Sodium (VOLTAREN) 1 % Apply 2 g topically 4 (four) times a day (Patient not taking: Reported on 7/28/2023)    [DISCONTINUED] gabapentin (NEURONTIN) 800 mg tablet Take 800 mg by mouth 3 (three) times a day    [DISCONTINUED] mirtazapine (REMERON) 45 MG tablet Take 45 mg by mouth daily at bedtime    [DISCONTINUED] omeprazole (PriLOSEC) 20 mg delayed release capsule Take 20 mg by mouth daily    [DISCONTINUED] traZODone (DESYREL) 100 mg tablet Take 100 mg by mouth daily at bedtime     No current facility-administered medications on file prior to visit.     Medications Discontinued During This Encounter   Medication Reason    Multiple Vitamin (Daily-Isra Multivitamin) TABS Reorder    ergocalciferol (VITAMIN D2) 50,000 units Reorder    Diclofenac Sodium (VOLTAREN) 1 %       She has No Known Allergies..    Review of Systems   Constitutional:  Negative for appetite change, chills, fatigue and fever.   HENT:  Negative for sore throat and trouble swallowing.    Eyes:  Negative for redness.   Respiratory:  Negative for shortness of breath.    Cardiovascular:  Negative for chest pain and palpitations.   Gastrointestinal:  Negative for abdominal pain, constipation and diarrhea.   Genitourinary:  Negative for dysuria and hematuria.   Musculoskeletal:  Negative for  back pain and neck pain.   Skin:  Negative for rash.   Neurological:  Negative for seizures, weakness and headaches.   Hematological:  Negative for adenopathy.   Psychiatric/Behavioral:  Negative for confusion. The patient is not nervous/anxious.          Objective:      BP 94/62 (BP Location: Left arm, Patient Position: Sitting, Cuff Size: Standard)   Pulse 75   Temp 97.6 °F (36.4 °C) (Temporal)   Wt 83 kg (183 lb)   SpO2 99%   BMI 30.45 kg/m²     Results Reviewed       None            Recent Results (from the past 1344 hour(s))   POCT urine dip    Collection Time: 12/13/23  4:34 PM   Result Value Ref Range    LEUKOCYTE ESTERASE,UA neg     NITRITE,UA neg     SL AMB POCT UROBILINOGEN neg     POCT URINE PROTEIN neg      PH,UA 6.0     BLOOD,UA 2+     SPECIFIC GRAVITY,UA 1.030     KETONES,UA neg     BILIRUBIN,UA neg     GLUCOSE, UA neg      COLOR,UA gold     CLARITY,UA clear         Physical Exam  Constitutional:       General: She is not in acute distress.     Appearance: Normal appearance.   HENT:      Head: Normocephalic and atraumatic.      Nose: Nose normal.      Mouth/Throat:      Mouth: Mucous membranes are moist.   Eyes:      Extraocular Movements: Extraocular movements intact.      Pupils: Pupils are equal, round, and reactive to light.   Cardiovascular:      Rate and Rhythm: Normal rate and regular rhythm.      Pulses: Normal pulses.      Heart sounds: Normal heart sounds. No murmur heard.     No friction rub.   Pulmonary:      Effort: Pulmonary effort is normal. No respiratory distress.      Breath sounds: Normal breath sounds. No wheezing.   Abdominal:      General: Abdomen is flat. Bowel sounds are normal. There is no distension.      Palpations: Abdomen is soft. There is no mass.      Tenderness: There is no abdominal tenderness. There is no guarding.   Musculoskeletal:         General: Normal range of motion.      Cervical back: Normal range of motion.   Neurological:      General: No focal deficit  present.      Mental Status: She is alert and oriented to person, place, and time. Mental status is at baseline.      Cranial Nerves: No cranial nerve deficit.   Psychiatric:         Mood and Affect: Mood normal.         Behavior: Behavior normal.

## 2024-02-14 ENCOUNTER — TELEPHONE (OUTPATIENT)
Dept: BARIATRICS | Facility: CLINIC | Age: 54
End: 2024-02-14

## 2024-02-20 ENCOUNTER — OFFICE VISIT (OUTPATIENT)
Age: 54
End: 2024-02-20
Payer: COMMERCIAL

## 2024-02-20 VITALS
HEART RATE: 62 BPM | SYSTOLIC BLOOD PRESSURE: 110 MMHG | BODY MASS INDEX: 29.56 KG/M2 | DIASTOLIC BLOOD PRESSURE: 62 MMHG | WEIGHT: 177.4 LBS | HEIGHT: 65 IN

## 2024-02-20 DIAGNOSIS — E66.3 OVERWEIGHT (BMI 25.0-29.9): Primary | ICD-10-CM

## 2024-02-20 DIAGNOSIS — R73.03 PREDIABETES: ICD-10-CM

## 2024-02-20 DIAGNOSIS — R73.01 ELEVATED FASTING GLUCOSE: ICD-10-CM

## 2024-02-20 PROCEDURE — 99204 OFFICE O/P NEW MOD 45 MIN: CPT | Performed by: PHYSICIAN ASSISTANT

## 2024-02-20 NOTE — PROGRESS NOTES
Assessment/Plan:  Laure was seen today for consult.    Diagnoses and all orders for this visit:    Overweight (BMI 25.0-29.9)  Comments:  Referred to weight management.  Orders:  -     Ambulatory Referral to Weight Management    Prediabetes    Elevated fasting glucose       - Discussed options of  and the role of weight loss medications.  - Explained the importance of making lifestyle changes first before starting anti-obesity medications.  - Patient should demonstrate lifestyle changes first before anti-obesity medication initiated.   - Patient is interested in pursuing conservative   - Initial weight loss goal of 5-10% weight loss for improved health as studies have shown this is where we see the greatest impact on improving health and decreasing risk of obesity related conditions.  - Weight loss can improve patient's co-morbid conditions and/or prevent weight-related complications.  - Stop Bang 2/8  - Labs reviewed: As below.      General Recommendations:  Nutrition:  Eat breakfast daily.  Do not skip meals.     Food log (ie.) www.myfitnesspal.com, sparkpeople.com, loseit.com, calorieking.com, etc.    Practice mindful eating.  Be sure to set aside time to eat, eat slowly, and savor your food.    Hydration:    At least 64oz of water daily.  No sugar sweetened beverages.  No juice (eat the fruit instead).    Exercise:  Studies have shown that the ideal exercise goal is somewhere between 150 to 300 minutes of moderate intensity exercise a week.  Start with exercising 10 minutes every other day and gradually increase physical activity with a goal of at least 150 minutes of moderate intensity exercise a week, divided over at least 3 days a week.  An example of this would be exercising 30 minutes a day, 5 days a week.  Resistance training can increase muscle mass and increase our resting metabolic rate.   FULL BODY resistance training is recommended 2-3 times a week.  Do not do this on consecutive days to allow for  muscle recovery.    Aim for a bare minimum 5000 steps, even on days you do not exercise.    Monitoring:   Weigh yourself daily.  If this causes undue stress, then just weigh yourself once a week.  Weigh yourself the same time of the day with the same amount of clothing on.  Preferably this should be done after waking up, before you eat, and with no clothing or minimal clothing on.    Specific Goals:  No sugary beverages. At least 64oz of water daily.  Goal protein intake of 60-80 grams per day  Practice  rule    Calorie goal:  1200-1400cal/day with physical activity (Provided with meal plan to follow).    Return visit:  4 weeks with me. See surgical team for consult    increase hydration  Please find out family history of thyroid cancer - which type oh thyroid cancer? Send me a [employer] message. If not medullary thyroid cancer, can potentially pursue wegovy. Currently receiving semaglutide injections at a weight loss clinic in [place]  If not, patient is a good candidate for topamax  Follow up with surgical team for annual surveillance      Total time spent reviewing chart, interviewing patient, examining patient, discussing plan, answering all questions, and documentin min.       ______________________________________________________________________        Subjective:   Chief Complaint   Patient presents with    Consult     [facility]wm consult       HPI: Laure Gaxiola  is a 53 y.o. female with history of migraines, vitamind D deficiency, iron deficiency anemia, hx of head trauma/concussion, and excess weight, here to pursue weight loss management.  Previous notes and records have been reviewed.    On Vyvanse - ADHD. Has been on for a long time, does not notice appetite suppression    Following  rule.    Previously was on topamax for migraines - discontinued for unknown reasons, denies any adverse side effects. Migraines have decreased in frequency. Does fiorecet PRN for abortive therapy at this point.      Laparoscopic gastric banding (2005); Gastric bypass (06/2008) Dr. Carty at [place].   Presurgical weight 260lbs  cristofer 160lbs  Pregnancy 2010 - 200lbs    Hemoglobin A1c 5.7  TSH WNL  TSH 3.31 (1/5/24)    Has been doing semaglutide down in [place]. Started 1/5/23  Semaglutide 0.25mg x 3 injections  0.5mg x4 injections   Lost 5 lbs in the past 6 weeks with semaglutide   No nausea or vomiting.   Paying cash $60 each injection.   Patient wishes to switch her weight management medications locally.       HPI  Wt Readings from Last 20 Encounters:   02/20/24 80.5 kg (177 lb 6.4 oz)   01/31/24 83 kg (183 lb)   12/13/23 80.7 kg (178 lb)   09/07/23 84.4 kg (186 lb)   07/28/23 83.9 kg (185 lb)   07/19/23 85.7 kg (189 lb)   07/07/23 85.7 kg (189 lb)   12/12/22 85.3 kg (188 lb)   11/08/22 85.4 kg (188 lb 3.2 oz)   10/27/22 85.5 kg (188 lb 9.6 oz)   10/26/22 84.9 kg (187 lb 3.2 oz)   10/20/22 83 kg (183 lb)   10/15/22 86.5 kg (190 lb 11.2 oz)   10/10/22 83.6 kg (184 lb 3.2 oz)   09/28/22 83.9 kg (185 lb)   04/22/22 85.3 kg (188 lb)   04/12/22 83.9 kg (185 lb)   03/11/22 83 kg (183 lb)   01/26/22 85.7 kg (189 lb)   12/17/21 86 kg (189 lb 9.6 oz)     Excess Weight:  Onset:  lifetime    Highest weight: 190 lbs (2022)  Lowest Weight: 160 lbs  Current weight: 177 lbs  What has been tried: Prescription Weight Loss Medications and surgery  Contributing factors: Poor Food Choices and Insufficient Physical Activity  Associated symptoms and effects: comorbid conditions and fatigue    Food logging:  B: sandwich or skip  S: none  L: skips  S: rice and beans  D: protein shake  S: skips    Dining out: 0-1x/month  Hydration: Water 1 bottle/day    1 cup of coffee - cinnamon and splenda, small creamer    No soda no juice  Alcohol: Used to 1 glass of wine/night - stopped since semaglutide  Smoking: None   Exercise: Exercise program - 3x week  Sleep:   8 hours  STOP-BANG Score: 2/8  Occupation: unemployed      Past Medical History:  "  Diagnosis Date    Asthma     Colon polyp     Gastroduodenitis     Headache(784.0) 10 years ago    Intestinal malabsorption     Migraine     Pyonephrosis     UTI (urinary tract infection)     Vitamin D deficiency      Patient denies personal and family history of pancreatitis, MEN-2 tumors. + thyroid cancer in uncle and aunt   Denies any hx of glaucoma, seizures, kidney stones, gallstones.  Denies Hx of CAD, PAD, palpitations, arrhythmia.   Denies uncontrolled anxiety or depression, suicidal behavior or thinking , insomnia or sleep disturbance.     Past Surgical History:   Procedure Laterality Date    ABDOMINAL SURGERY      Twisted bowel and internal hernia    CT CYSTOGRAM  05/23/2017    CT CYSTOGRAM  05/23/2017    DILATION AND CURETTAGE OF UTERUS  2017    GASTRIC BYPASS  06/2008    LAPAROSCOPIC GASTRIC BANDING  2005    SMALL INTESTINE SURGERY  2008    Gastro by pass    TUBAL LIGATION      WISDOM TOOTH EXTRACTION       The following portions of the patient's history were reviewed and updated as appropriate: allergies, current medications, past family history, past medical history, past social history, past surgical history, and problem list.    Review Of Systems:  Review of Systems   Constitutional:  Negative for fatigue and fever.   HENT:  Negative for sore throat, trouble swallowing and voice change.    Respiratory:  Negative for shortness of breath.    Cardiovascular:  Negative for chest pain.   Gastrointestinal:  Negative for abdominal pain, constipation, diarrhea, nausea and vomiting.        Denies GERD   Endocrine: Negative for cold intolerance and heat intolerance.   Genitourinary:  Negative for difficulty urinating.   Musculoskeletal:  Positive for back pain. Negative for arthralgias.   Psychiatric/Behavioral:  Negative for suicidal ideas.         Occasional anxiety, denies depression   All other systems reviewed and are negative.      Objective:  /62   Pulse 62   Ht 5' 5\" (1.651 m)   Wt 80.5 kg " (177 lb 6.4 oz)   BMI 29.52 kg/m²   Physical Exam  Vitals and nursing note reviewed.   Constitutional:       General: She is not in acute distress.     Appearance: Normal appearance. She is obese. She is not ill-appearing, toxic-appearing or diaphoretic.   HENT:      Head: Normocephalic and atraumatic.   Eyes:      General:         Right eye: No discharge.         Left eye: No discharge.      Conjunctiva/sclera: Conjunctivae normal.   Pulmonary:      Effort: Pulmonary effort is normal. No respiratory distress.   Musculoskeletal:         General: Normal range of motion.      Cervical back: Normal range of motion. No rigidity.      Right lower leg: No edema.      Left lower leg: No edema.   Skin:     Coloration: Skin is not pale.      Findings: No erythema or rash.   Neurological:      General: No focal deficit present.      Mental Status: She is alert.   Psychiatric:         Mood and Affect: Mood normal.         Behavior: Behavior normal.         Labs and Imaging  Recent labs and imaging have been personally reviewed.  Lab Results   Component Value Date    WBC 4.6 08/02/2023    HGB 12.3 08/02/2023    HCT 36.6 08/02/2023    MCV 92.0 08/02/2023     08/02/2023     Lab Results   Component Value Date     04/24/2015    SODIUM 139 08/02/2023    K 4.5 08/02/2023     08/02/2023    CO2 29 08/02/2023    ANIONGAP 6 04/24/2015    AGAP 9 10/16/2022    BUN 10 08/02/2023    CREATININE 0.68 08/02/2023    GLUC 92 08/02/2023    GLUF 92 05/09/2015    CALCIUM 9.5 08/02/2023    AST 19 08/02/2023    ALT 15 08/02/2023    ALKPHOS 93 08/02/2023    PROT 7.1 04/24/2015    TP 6.8 08/02/2023    BILITOT 0.34 04/24/2015    TBILI 0.3 08/02/2023    EGFR 106 08/02/2023     Lab Results   Component Value Date    HGBA1C 5.7 01/05/2024     Lab Results   Component Value Date    KDX4PLHLKWCY 1.740 01/07/2022    TSH 2.53 08/02/2023     Lab Results   Component Value Date    CHOLESTEROL 207 (H) 08/02/2023     Lab Results   Component Value  Date    HDL 84 08/02/2023     Lab Results   Component Value Date    TRIG 84 08/02/2023     Lab Results   Component Value Date    LDLCALC 106 (H) 08/02/2023

## 2024-02-20 NOTE — PATIENT INSTRUCTIONS
- Discussed options of  and the role of weight loss medications.  - Explained the importance of making lifestyle changes first before starting anti-obesity medications.  - Patient should demonstrate lifestyle changes first before anti-obesity medication initiated.   - Patient is interested in pursuing conservative   - Initial weight loss goal of 5-10% weight loss for improved health as studies have shown this is where we see the greatest impact on improving health and decreasing risk of obesity related conditions.  - Weight loss can improve patient's co-morbid conditions and/or prevent weight-related complications.  - Stop Bang 2/8  - Labs reviewed: As below.      General Recommendations:  Nutrition:  Eat breakfast daily.  Do not skip meals.     Food log (ie.) www.Swing[employer].com, sparkpeople.com, loseit.com, calorieking.com, etc.    Practice mindful eating.  Be sure to set aside time to eat, eat slowly, and savor your food.    Hydration:    At least 64oz of water daily.  No sugar sweetened beverages.  No juice (eat the fruit instead).    Exercise:  Studies have shown that the ideal exercise goal is somewhere between 150 to 300 minutes of moderate intensity exercise a week.  Start with exercising 10 minutes every other day and gradually increase physical activity with a goal of at least 150 minutes of moderate intensity exercise a week, divided over at least 3 days a week.  An example of this would be exercising 30 minutes a day, 5 days a week.  Resistance training can increase muscle mass and increase our resting metabolic rate.   FULL BODY resistance training is recommended 2-3 times a week.  Do not do this on consecutive days to allow for muscle recovery.    Aim for a bare minimum 5000 steps, even on days you do not exercise.    Monitoring:   Weigh yourself daily.  If this causes undue stress, then just weigh yourself once a week.  Weigh yourself the same time of the day with the same amount of clothing on.   Preferably this should be done after waking up, before you eat, and with no clothing or minimal clothing on.    Specific Goals:  No sugary beverages. At least 64oz of water daily.  Goal protein intake of 60-80 grams per day  Practice 30/60 rule    Calorie goal:  1200-1400cal/day with physical activity (Provided with meal plan to follow).    Return visit:  4 weeks with me. See surgical team for consult    increase hydration  Please find out family history of thyroid cancer - which type oh thyroid cancer? Send me a [employer] message. If not medullary thyroid cancer, can potentially pursue wegovy. Currently receiving semaglutide injections at a weight loss clinic in [place]  If not, patient is a good candidate for topamax  Follow up with surgical team for annual surveillance

## 2024-02-21 ENCOUNTER — TELEPHONE (OUTPATIENT)
Age: 54
End: 2024-02-21

## 2024-02-21 NOTE — TELEPHONE ENCOUNTER
Patient called in to to relay a message to ALICIA Guy that her family history includes  papillary thyroid cancer.  Patient stated the PA needed that information to prescribe rx for WM meds.

## 2024-02-23 DIAGNOSIS — E66.3 OVERWEIGHT (BMI 25.0-29.9): Primary | ICD-10-CM

## 2024-02-23 NOTE — TELEPHONE ENCOUNTER
Thank you. Please let the patient know I sent Rx for Wegovy to her pharmacy. This will prompt the prior authorization process.

## 2024-02-28 ENCOUNTER — OFFICE VISIT (OUTPATIENT)
Dept: BARIATRICS | Facility: CLINIC | Age: 54
End: 2024-02-28

## 2024-02-28 VITALS
BODY MASS INDEX: 30.13 KG/M2 | SYSTOLIC BLOOD PRESSURE: 130 MMHG | HEIGHT: 64 IN | TEMPERATURE: 97 F | DIASTOLIC BLOOD PRESSURE: 78 MMHG | WEIGHT: 176.5 LBS | HEART RATE: 109 BPM

## 2024-02-28 DIAGNOSIS — Z48.815 ENCOUNTER FOR SURGICAL AFTERCARE FOLLOWING SURGERY OF DIGESTIVE SYSTEM: Primary | ICD-10-CM

## 2024-02-28 DIAGNOSIS — Z98.84 BARIATRIC SURGERY STATUS: ICD-10-CM

## 2024-02-28 DIAGNOSIS — K91.2 POSTSURGICAL MALABSORPTION: ICD-10-CM

## 2024-02-28 DIAGNOSIS — E66.9 OBESITY, CLASS I, BMI 30-34.9: ICD-10-CM

## 2024-02-28 NOTE — PROGRESS NOTES
Assessment/Plan:     Patient ID: Laure Gaxiola is a 53 y.o. female.     Bariatric Surgery Status/Obesity/BMI 30    -s/p lap band in 2005 with conversion to [place]-En-Y Gastric Bypass with Dr. Carty in [facility] in 2008. Presents to the office today for an annual to establish care with surgical team. She has been struggling with weight loss, reach cristofer of 160 lbs with a goal of 135 lbs. At times not eating enough; tries to eat healthy overall. Doesn't always eat her proteins first. Does [employer] fit as physical activity twice a week. Following with our MWM to help furthet weight loss - she is currently getting semaglutide injections at a [facility] - 0.5 mg dose; reports she has lost weight but currently plateau. Insurance does not cover injectables; she is skeptical on starting oral AOMs.     Otherwise, she is doing well, tolerating a regular diet. Certain foods more tolerable than others. She denies current abdominal pain, N/V/D/C, regurgitation, reflux or dysphagia. Of note, reports had LUQ abdominal pain in the past which led to emergency surgeries; unsure what surgery.       PLAN:     - provided information on body scan; body comp and SECA.   - Recommended to track caloric intake - she is following the diet plan from [facility]. Encouraged to continue to do so and don't skip meals.   - UGI order to evaluate anatomy.   - Continue to f/u with MWM to help further weight loss.   - Routine follow up in 1 year for annual visit.   - Continue with healthy lifestyle, adequate protein intake of 60 gm, fluid intake of at least 64 oz.   - Continue with MVI daily.   - Activity as tolerated.   - Labs ordered and will adjust accordingly if any deficiency.   - Follow up with RD and SW as needed.       Continued/Maintain healthy weight loss with good nutrition intakes.  Adequate hydration with at least 64oz. fluid intake.  Follow diet as discussed.  Follow vitamin and mineral recommendations as reviewed with you.  Exercise as  tolerated.    Colonoscopy referral made: UTD  Mammogram - UTD    Follow-up in 1 year for annual visit. We kindly ask that your arrive 15 minutes before your scheduled appointment time with your provider to allow our staff to room you, get your vital signs and update your chart.    Get lab work done prior to annual visit. Please call the office if you need a script.  It is recommended to check with your insurance BEFORE getting labs done to make sure they are covered by your policy.      Call our office if you have any problems with abdominal pain especially associated with fever, chills, nausea, vomiting or any other concerns.    All  Post-bariatric surgery patients should be aware that very small quantities of any alcohol can cause impairment and it is very possible not to feel the effect. The effect can be in the system for several hours.  It is also a stomach irritant.     It is advised to AVOID alcohol, Nonsteroidal antiinflammatory drugs (NSAIDS) and nicotine of all forms . Any of these can cause stomach irritation/pain.    Discussed the effects of alcohol on a bariatric patient and the increased impairment risk.     Keep up the good work!     Postsurgical Malabsorption   -At risk for malabsorption of vitamins/minerals secondary to malabsorption and restriction of intake from bariatric surgery  -Currently taking adequate postop bariatric surgery vitamin supplementation  -Last set of bariatric labs completed on 08/2023 and showed WNL   -Next set of bariatric labs ordered for approximately 2 weeks  -Patient received education about the importance of adhering to a lifelong supplementation regimen to avoid vitamin/mineral deficiencies      Diagnoses and all orders for this visit:    Encounter for surgical aftercare following surgery of digestive system  -     FL UPPER GI UGI; Future  -     Cancel: CBC; Future  -     Cancel: Comprehensive metabolic panel; Future  -     Cancel: PTH, intact; Future  -     Cancel: Iron  Panel (Includes Ferritin, Iron Sat%, Iron, and TIBC); Future  -     Cancel: Folate; Future  -     Cancel: Vitamin A; Future  -     Cancel: Vitamin B1, whole blood; Future  -     Cancel: Vitamin B12; Future  -     Cancel: Vitamin D 25 hydroxy; Future  -     Cancel: Zinc; Future  -     CBC; Future  -     Comprehensive metabolic panel; Future  -     Folate; Future  -     PTH, intact; Future  -     Vitamin A; Future  -     Vitamin B1, whole blood; Future  -     Vitamin D 25 hydroxy; Future  -     Vitamin B12; Future  -     Zinc; Future  -     Iron, TIBC and Ferritin Panel; Future  -     CBC  -     Comprehensive metabolic panel  -     Folate  -     PTH, intact  -     Vitamin A  -     Vitamin B1, whole blood  -     Vitamin D 25 hydroxy  -     Vitamin B12  -     Zinc  -     Iron, TIBC and Ferritin Panel    Bariatric surgery status  -     FL UPPER GI UGI; Future  -     Cancel: CBC; Future  -     Cancel: Comprehensive metabolic panel; Future  -     Cancel: PTH, intact; Future  -     Cancel: Iron Panel (Includes Ferritin, Iron Sat%, Iron, and TIBC); Future  -     Cancel: Folate; Future  -     Cancel: Vitamin A; Future  -     Cancel: Vitamin B1, whole blood; Future  -     Cancel: Vitamin B12; Future  -     Cancel: Vitamin D 25 hydroxy; Future  -     Cancel: Zinc; Future  -     CBC; Future  -     Comprehensive metabolic panel; Future  -     Folate; Future  -     PTH, intact; Future  -     Vitamin A; Future  -     Vitamin B1, whole blood; Future  -     Vitamin D 25 hydroxy; Future  -     Vitamin B12; Future  -     Zinc; Future  -     Iron, TIBC and Ferritin Panel; Future  -     CBC  -     Comprehensive metabolic panel  -     Folate  -     PTH, intact  -     Vitamin A  -     Vitamin B1, whole blood  -     Vitamin D 25 hydroxy  -     Vitamin B12  -     Zinc  -     Iron, TIBC and Ferritin Panel    Postsurgical malabsorption  -     FL UPPER GI UGI; Future  -     Cancel: CBC; Future  -     Cancel: Comprehensive metabolic panel;  Future  -     Cancel: PTH, intact; Future  -     Cancel: Iron Panel (Includes Ferritin, Iron Sat%, Iron, and TIBC); Future  -     Cancel: Folate; Future  -     Cancel: Vitamin A; Future  -     Cancel: Vitamin B1, whole blood; Future  -     Cancel: Vitamin B12; Future  -     Cancel: Vitamin D 25 hydroxy; Future  -     Cancel: Zinc; Future  -     CBC; Future  -     Comprehensive metabolic panel; Future  -     Folate; Future  -     PTH, intact; Future  -     Vitamin A; Future  -     Vitamin B1, whole blood; Future  -     Vitamin D 25 hydroxy; Future  -     Vitamin B12; Future  -     Zinc; Future  -     Iron, TIBC and Ferritin Panel; Future  -     CBC  -     Comprehensive metabolic panel  -     Folate  -     PTH, intact  -     Vitamin A  -     Vitamin B1, whole blood  -     Vitamin D 25 hydroxy  -     Vitamin B12  -     Zinc  -     Iron, TIBC and Ferritin Panel    Obesity, Class I, BMI 30-34.9  -     FL UPPER GI UGI; Future  -     Cancel: CBC; Future  -     Cancel: Comprehensive metabolic panel; Future  -     Cancel: PTH, intact; Future  -     Cancel: Iron Panel (Includes Ferritin, Iron Sat%, Iron, and TIBC); Future  -     Cancel: Folate; Future  -     Cancel: Vitamin A; Future  -     Cancel: Vitamin B1, whole blood; Future  -     Cancel: Vitamin B12; Future  -     Cancel: Vitamin D 25 hydroxy; Future  -     Cancel: Zinc; Future  -     CBC; Future  -     Comprehensive metabolic panel; Future  -     Folate; Future  -     PTH, intact; Future  -     Vitamin A; Future  -     Vitamin B1, whole blood; Future  -     Vitamin D 25 hydroxy; Future  -     Vitamin B12; Future  -     Zinc; Future  -     Iron, TIBC and Ferritin Panel; Future  -     CBC  -     Comprehensive metabolic panel  -     Folate  -     PTH, intact  -     Vitamin A  -     Vitamin B1, whole blood  -     Vitamin D 25 hydroxy  -     Vitamin B12  -     Zinc  -     Iron, TIBC and Ferritin Panel    BMI 30.0-30.9,adult  -     FL UPPER GI UGI; Future  -     Cancel:  CBC; Future  -     Cancel: Comprehensive metabolic panel; Future  -     Cancel: PTH, intact; Future  -     Cancel: Iron Panel (Includes Ferritin, Iron Sat%, Iron, and TIBC); Future  -     Cancel: Folate; Future  -     Cancel: Vitamin A; Future  -     Cancel: Vitamin B1, whole blood; Future  -     Cancel: Vitamin B12; Future  -     Cancel: Vitamin D 25 hydroxy; Future  -     Cancel: Zinc; Future  -     CBC; Future  -     Comprehensive metabolic panel; Future  -     Folate; Future  -     PTH, intact; Future  -     Vitamin A; Future  -     Vitamin B1, whole blood; Future  -     Vitamin D 25 hydroxy; Future  -     Vitamin B12; Future  -     Zinc; Future  -     Iron, TIBC and Ferritin Panel; Future  -     CBC  -     Comprehensive metabolic panel  -     Folate  -     PTH, intact  -     Vitamin A  -     Vitamin B1, whole blood  -     Vitamin D 25 hydroxy  -     Vitamin B12  -     Zinc  -     Iron, TIBC and Ferritin Panel         Subjective:      Patient ID: Laure Gaxiola is a 53 y.o. female.    -s/p lap band in 2005 with conversion to [place]-En-Y Gastric Bypass with Dr. Carty in [facility] in 2008. Presents to the office today for an annual to establish care with surgical team.    Initial: 260 lbs (before the band); 220 lbs   Current: 176.5 lbs   EWL: (Weight loss is ahead of schedule at this post surgical period.)  Maged 220 lbs (after band); 160 lbs   Goal - 135 lbs.   Current BMI is Body mass index is 30.78 kg/m².    Tolerating a regular diet-yes  Eating at least 60 grams of protein per day-yes  Following 30/60 minute rule with liquids-yes  Drinking at least 64 ounces of fluid per day-no - drinking about 16-32 oz.  Drinking carbonated beverages-no  Sufficient exercise-yes - [place] fit twice a week.   Using NSAIDs regularly-no  Using nicotine-no  Using alcohol-no  Supplements: Multivitamins, Iron, and VITAMIN D, VITAMIN B12, calcium.  + vitamin C+     The following portions of the patient's history were reviewed  "and updated as appropriate: allergies, current medications, past family history, past medical history, past social history, past surgical history and problem list.    Review of Systems   Constitutional: Negative.    Respiratory: Negative.     Cardiovascular: Negative.    Gastrointestinal: Negative.    Musculoskeletal: Negative.    Neurological: Negative.    Psychiatric/Behavioral: Negative.           Objective:    /78   Pulse (!) 109   Temp (!) 97 °F (36.1 °C) (Tympanic)   Ht 5' 3.5\" (1.613 m)   Wt 80.1 kg (176 lb 8 oz)   BMI 30.78 kg/m²      Physical Exam  Vitals and nursing note reviewed.   Constitutional:       Appearance: Normal appearance. She is obese.   Cardiovascular:      Rate and Rhythm: Normal rate and regular rhythm.      Pulses: Normal pulses.      Heart sounds: Normal heart sounds.   Pulmonary:      Effort: Pulmonary effort is normal.      Breath sounds: Normal breath sounds.   Abdominal:      General: Bowel sounds are normal.      Palpations: Abdomen is soft.      Tenderness: There is no abdominal tenderness.   Musculoskeletal:         General: Normal range of motion.   Skin:     General: Skin is warm and dry.   Neurological:      General: No focal deficit present.      Mental Status: She is alert and oriented to person, place, and time.   Psychiatric:         Mood and Affect: Mood normal.         Behavior: Behavior normal.         Thought Content: Thought content normal.         Judgment: Judgment normal.             "

## 2024-03-04 NOTE — PSYCH
Assessment/Plan:      Diagnoses and all orders for this visit:    PTSD (post-traumatic stress disorder)  -     Ambulatory Referral to Psychiatry          Subjective:      Patient ID: Aleksandar Townsend is a 50 y o  female  HPI:     Pre-morbid level of function and History of Present Illness: Evelyne Mcburney reports not believing in therapy due to letting you know "what you need to hear" but wants to get help for ADHD and stress  Evelyne Mcburney wants to try therapy again, because she did not give the process more consideration before- it was one time family session with her daughter  Evelyne Mcburney points at her sadness and tearfulness, wishes to be happy, and her thoughts are the most disturbing for her daily functioning  Evelyne Mcburney showed good willingness and seemed very honest that she does not like to hear only agreements and would not be afraid to be challenged  Previous Psychiatric/psychological treatment/year: Psych and counseling services briefly in the past  Current Psychiatrist/Therapist: takes meds through her PCP  Outpatient and/or Partial and Other Community Resources Used (CTT, ICM, VNA): NA      Problem Assessment:     SOCIAL/VOCATION:  Family Constellation (include parents, relationship with each and pertinent Psych/Medical History):     Family History   Problem Relation Age of Onset   • Thyroid disease Mother    • Heart disease Family    • Diabetes Family    • Hypertension Family    • Breast cancer Family    • Colon cancer Family    • Prostate cancer Family    • Gallbladder disease Family    • Stomach cancer Family    • Ovarian cancer Family    • Kidney cancer Family    • Lung cancer Family    • Thyroid disease Family        Mother: Mitzi Castelan, 79  Spouse: Lukas, 40   Father: Saul, 66  Children: Italia Dai, 25; Dean Reeves, 25; Lukas, 16Relda Coates- 11  Sibling: Carlos Curling- 55; Pancho, 44   Sibling: Reji, 41: Kerry, 36   Children: NA   Other: NA    Evelyne Mcburney relates best to all siblings  she lives with spouse and children  she does not live alone  Domestic Violence: No past history of domestic violence    Additional Comments related to family/relationships/peer support: old friends & all siblings  School or Work History (strengths/limitations/needs): [employer]    Her highest grade level achieved was graduating with Master's      history includesNA    Financial status includes living comfortably     LEISURE ASSESSMENT (Include past and present hobbies/interests and level of involvement (Ex: Group/Club Affiliations): painting, drawing, reading  her primary language is [place]  Preferred language is [place]  Ethnic considerations are NA  Religions affiliations and level of involvement NA   Does spirituality help you cope? No    FUNCTIONAL STATUS: There has been a recent change in Charisse Zacarias ability to do the following: NA    Level of Assistance Needed/By Whom?: NA    Charisse Zacarias learns best by  hands on    SUBSTANCE ABUSE ASSESSMENT: no substance abuse    Substance/Route/Age/Amount/Frequency/Last Use: NA    DETOX HISTORY: NA    Previous detox/rehab treatment: NA    HEALTH ASSESSMENT: has had decreased appetite for 5 days or more and no referral to PCP needed    LEGAL: No Mental Health Advance Directive or Power of  on file    Prenatal History: N/A    Delivery History: N/A    Developmental Milestones: N/A  Temperament as an infant was N/A  Temperament as a toddler was N/A  Temperament at school age was N/A  Temperament as a teenager was N/A  Risk Assessment:   The following ratings are based on my interview(s) with Pt    Risk of Harm to Self:   Demographic risk factors include NA  Historical Risk Factors include uncle hanged himself in 2013  Recent Specific Risk Factors include NA  Additional Factors for a Child or Adolescent NA    Risk of Harm to Others:   Demographic Risk Factors include NA  Historical Risk Factors include NA  Recent Specific Risk Factors include NA    Access to Weapons:   Charisse Zacarias has access to the following weapons: yes   The 3 following steps have been taken to ensure weapons are properly secured: yes    Based on the above information, the client presents the following risk of harm to self or others:  low    The following interventions are recommended:   consultation with PCP if needed    Notes regarding this Risk Assessment: denies any history or current SI/HI           Review Of Systems:     Mood Normal, Anxiety and Depression   Behavior Normal    Thought Content Normal and Disturbing Thoughts, Feelings   General Normal , Emotional Problems and Sleep Disturbances   Personality Normal   Other Psych Symptoms Normal   Constitutional Normal   ENT Normal   Cardiovascular Normal    Respiratory Normal    Gastrointestinal Normal and gastric bypass   Genitourinary Normal    Musculoskeletal hands painful   Integumentary Normal    Neurological Confusion, Numbness and speech issues since the accident   Endocrine Normal          Mental status:  Appearance calm and cooperative    Mood depressed, anxious and mood appropriate   Affect affect appropriate    Speech speech soft   Thought Processes normal thought processes   Hallucinations no hallucinations present    Thought Content no delusions   Abnormal Thoughts no suicidal thoughts  and no homicidal thoughts    Orientation  oriented to person and place and time   Remote Memory no memories from the day of the accident    Attention Span concentration impaired   Intellect Appears to be of Average Intelligence   Fund of Knowledge displays adequate knowledge of current events   Insight Limited insight   Judgement judgment was limited   Muscle Strength Decreased muscle strength   Language no difficulty naming common objects, no difficulty repeating a phrase  and no difficulty writing a sentence    Pain hands, joints   Pain Scale 4     Visit start and stop times:    11/01/22  Start Time: 1011  Stop Time: 1049  Total Visit Time: 38 minutes done

## 2024-03-19 ENCOUNTER — OFFICE VISIT (OUTPATIENT)
Age: 54
End: 2024-03-19

## 2024-03-19 VITALS
HEART RATE: 69 BPM | TEMPERATURE: 97.2 F | WEIGHT: 172.6 LBS | SYSTOLIC BLOOD PRESSURE: 98 MMHG | BODY MASS INDEX: 29.47 KG/M2 | HEIGHT: 64 IN | DIASTOLIC BLOOD PRESSURE: 60 MMHG

## 2024-03-19 DIAGNOSIS — E66.3 OVERWEIGHT: Primary | ICD-10-CM

## 2024-03-19 NOTE — PROGRESS NOTES
Assessment/Plan:  Laure was seen today for follow-up.    Diagnoses and all orders for this visit:    Overweight        General Recommendations:  Nutrition:  Eat breakfast daily.  Do not skip meals.     Food log (ie.) www.Tapestry.com, sparkpeople.com, loseit.com, SugarCRM.com, etc.    Practice mindful eating.  Be sure to set aside time to eat, eat slowly, and savor your food.    Hydration:    At least 64oz of water daily.  No sugar sweetened beverages.  No juice (eat the fruit instead).    Exercise:  Studies have shown that the ideal exercise goal is somewhere between 150 to 300 minutes of moderate intensity exercise a week.  Start with exercising 10 minutes every other day and gradually increase physical activity with a goal of at least 150 minutes of moderate intensity exercise a week, divided over at least 3 days a week.  An example of this would be exercising 30 minutes a day, 5 days a week.  Resistance training can increase muscle mass and increase our resting metabolic rate.   FULL BODY resistance training is recommended 2-3 times a week.  Do not do this on consecutive days to allow for muscle recovery.    Aim for a bare minimum 5000 steps, even on days you do not exercise.    Monitoring:   Weigh yourself daily.  If this causes undue stress, then just weigh yourself once a week.  Weigh yourself the same time of the day with the same amount of clothing on.  Preferably this should be done after waking up, before you eat, and with no clothing or minimal clothing on.    Specific Goals:  No sugary beverages. At least 64oz of water daily.  Goal protein intake of 60-80 grams per day  Gradually increase physical activity to a goal of 5 days per week for 30 minutes of MODERATE intensity PLUS 2 days per week of FULL BODY resistance training    Calorie goal:  1200-1400cal/day with physical activity (Provided with meal plan to follow).     Return visit:    Unfortunately insurance is not covering GLP1 RA  Offered  trial of topamax. Use and side effect profile reviewed  Patient wishes to continue conservatively with diet and exercise. Provided dietary plan  She will contact us if interested in follow up or RD menu planning.  Continue annual surveillance with surgical team.     Total time spent reviewing chart, interviewing patient, examining patient, discussing plan, answering all questions, and documentin min.       ______________________________________________________________________        Subjective:   Chief Complaint   Patient presents with    Follow-up     Mwm 4-wk f/u     Patient here to discuss weight associated problems and nutrition goals  HPI: Laure Gaxiola  is a 53 y.o. female with history of migraines, vitamind D deficiency, iron deficiency anemia, hx of head trauma/concussion,  and excess weight.  Weight loss plan:  Conservative Program.   Most recent notes and records were reviewed.    On Vyvanse - ADHD. Has been on for a long time, does not notice appetite suppression     Following 30/30 rule.     Previously was on topamax for migraines - discontinued for unknown reasons, denies any adverse side effects. Migraines have decreased in frequency. Does fiorecet PRN for abortive therapy at this point.      Laparoscopic gastric banding (); Gastric bypass (2008) Dr. Carty at [facility].   Presurgical weight 260lbs  cristofer 160lbs  Pregnancy 2010 - 200lbs     Hemoglobin A1c 5.7 - prediabetes  TSH WNL  TSH 3.31 (24)     Has been doing semaglutide down in [place]. Started 23  Semaglutide 0.25mg x 3 injections  0.5mg x4 injections   Lost 5 lbs in the past 6 weeks with semaglutide   No nausea or vomiting.   Paying cash $60 each injection.   Patient wishes to switch her weight management medications locally.    Insurance unfortunately will not cover GLP1 RA.     Patient does have a family history of papillary thyroid cancer.     Wt Readings from Last 10 Encounters:   24 78.3 kg (172 lb 9.6 oz)    02/28/24 80.1 kg (176 lb 8 oz)   02/20/24 80.5 kg (177 lb 6.4 oz)   01/31/24 83 kg (183 lb)   12/13/23 80.7 kg (178 lb)   09/07/23 84.4 kg (186 lb)   07/28/23 83.9 kg (185 lb)   07/19/23 85.7 kg (189 lb)   07/07/23 85.7 kg (189 lb)   12/12/22 85.3 kg (188 lb)       Excess Weight:  Onset:  lifetime    Highest weight: 190 lbs (2022)  Lowest Weight: 160 lbs  Current weight: 177 lbs  What has been tried: Prescription Weight Loss Medications and surgery  Contributing factors: Poor Food Choices and Insufficient Physical Activity  Associated symptoms and effects: comorbid conditions and fatigue     Food logging:  B: sandwich or skip  S: none  L: skips  S: rice and beans  D: protein shake  S: skips     Dining out: 0-1x/month  Hydration:        Water 1 bottle/day                          1 cup of coffee - cinnamon and splenda, small creamer                          No soda no juice  Alcohol:           Used to 1 glass of wine/night - stopped since semaglutide  Smoking:         None      Exercise:         Exercise program - 3x week  Sleep:              8 hours  STOP-BANG Score: 2/8  Occupation:     unemployed      Patient denies personal and family history of pancreatitis, MEN-2 tumors. + thyroid cancer in aunt (patient confirmed papillary)  Denies any hx of glaucoma, seizures, kidney stones, gallstones.  Denies Hx of CAD, PAD, palpitations, arrhythmia.   Denies uncontrolled anxiety or depression, suicidal behavior or thinking , insomnia or sleep disturbance.     The following portions of the patient's history were reviewed and updated as appropriate: allergies, current medications, past family history, past medical history, past social history, past surgical history, and problem list.    Review Of Systems:  Review of Systems   Constitutional:  Negative for fatigue and fever.   HENT:  Negative for sore throat, trouble swallowing and voice change.    Respiratory:  Negative for shortness of breath.    Cardiovascular:  Negative  "for chest pain.   Gastrointestinal:  Negative for abdominal pain, constipation, diarrhea, nausea and vomiting.   Endocrine: Negative for cold intolerance and heat intolerance.   Genitourinary:  Negative for difficulty urinating.   Musculoskeletal:  Negative for arthralgias and back pain.   Psychiatric/Behavioral:  Negative for suicidal ideas. The patient is not nervous/anxious.    All other systems reviewed and are negative.      Objective:  BP 98/60   Pulse 69   Temp (!) 97.2 °F (36.2 °C) (Tympanic)   Ht 5' 4\" (1.626 m)   Wt 78.3 kg (172 lb 9.6 oz)   BMI 29.63 kg/m²   Physical Exam  Vitals and nursing note reviewed.   Constitutional:       General: She is not in acute distress.     Appearance: Normal appearance. She is obese. She is not ill-appearing, toxic-appearing or diaphoretic.   HENT:      Head: Normocephalic and atraumatic.   Eyes:      General:         Right eye: No discharge.         Left eye: No discharge.      Conjunctiva/sclera: Conjunctivae normal.   Pulmonary:      Effort: Pulmonary effort is normal. No respiratory distress.   Musculoskeletal:         General: Normal range of motion.      Cervical back: Normal range of motion and neck supple. No rigidity or tenderness.      Right lower leg: No edema.      Left lower leg: No edema.   Skin:     Coloration: Skin is not pale.      Findings: No erythema or rash.   Neurological:      General: No focal deficit present.      Mental Status: She is alert.   Psychiatric:         Mood and Affect: Mood normal.         Labs and Imaging  Recent labs and imaging have been personally reviewed.  Lab Results   Component Value Date    WBC 4.6 08/02/2023    HGB 12.3 08/02/2023    HCT 36.6 08/02/2023    MCV 92.0 08/02/2023     08/02/2023     Lab Results   Component Value Date     04/24/2015    SODIUM 139 08/02/2023    K 4.5 08/02/2023     08/02/2023    CO2 29 08/02/2023    ANIONGAP 6 04/24/2015    AGAP 9 10/16/2022    BUN 10 08/02/2023    CREATININE " 0.68 08/02/2023    GLUC 92 08/02/2023    GLUF 92 05/09/2015    CALCIUM 9.5 08/02/2023    AST 19 08/02/2023    ALT 15 08/02/2023    ALKPHOS 93 08/02/2023    PROT 7.1 04/24/2015    TP 6.8 08/02/2023    BILITOT 0.34 04/24/2015    TBILI 0.3 08/02/2023    EGFR 106 08/02/2023     Lab Results   Component Value Date    HGBA1C 5.7 01/05/2024     Lab Results   Component Value Date    WIA0SFXMAMLJ 1.740 01/07/2022    TSH 2.53 08/02/2023     Lab Results   Component Value Date    CHOLESTEROL 207 (H) 08/02/2023     Lab Results   Component Value Date    HDL 84 08/02/2023     Lab Results   Component Value Date    TRIG 84 08/02/2023     Lab Results   Component Value Date    LDLCALC 106 (H) 08/02/2023

## 2024-03-19 NOTE — PATIENT INSTRUCTIONS
General Recommendations:  Nutrition:  Eat breakfast daily.  Do not skip meals.     Food log (ie.) www.kontoblick.com, sparkpeople.com, loseit.com, calorieking.com, etc.    Practice mindful eating.  Be sure to set aside time to eat, eat slowly, and savor your food.    Hydration:    At least 64oz of water daily.  No sugar sweetened beverages.  No juice (eat the fruit instead).    Exercise:  Studies have shown that the ideal exercise goal is somewhere between 150 to 300 minutes of moderate intensity exercise a week.  Start with exercising 10 minutes every other day and gradually increase physical activity with a goal of at least 150 minutes of moderate intensity exercise a week, divided over at least 3 days a week.  An example of this would be exercising 30 minutes a day, 5 days a week.  Resistance training can increase muscle mass and increase our resting metabolic rate.   FULL BODY resistance training is recommended 2-3 times a week.  Do not do this on consecutive days to allow for muscle recovery.    Aim for a bare minimum 5000 steps, even on days you do not exercise.    Monitoring:   Weigh yourself daily.  If this causes undue stress, then just weigh yourself once a week.  Weigh yourself the same time of the day with the same amount of clothing on.  Preferably this should be done after waking up, before you eat, and with no clothing or minimal clothing on.    Specific Goals:  No sugary beverages. At least 64oz of water daily.  Goal protein intake of 60-80 grams per day  Gradually increase physical activity to a goal of 5 days per week for 30 minutes of MODERATE intensity PLUS 2 days per week of FULL BODY resistance training    Calorie goal:  1200-1400cal/day with physical activity (Provided with meal plan to follow).     Return visit:    Unfortunately insurance is not covering GLP1 RA  Offered trial of topamax. Use and side effect profile reviewed  Patient wishes to continue conservatively with diet and  exercise. Provided dietary plan  She will contact us if interested in follow up or RD menu planning.  Continue annual surveillance with surgical team.

## 2024-06-13 ENCOUNTER — OFFICE VISIT (OUTPATIENT)
Dept: INTERNAL MEDICINE CLINIC | Facility: CLINIC | Age: 54
End: 2024-06-13
Payer: COMMERCIAL

## 2024-06-13 VITALS
BODY MASS INDEX: 27.66 KG/M2 | OXYGEN SATURATION: 98 % | HEIGHT: 64 IN | HEART RATE: 66 BPM | SYSTOLIC BLOOD PRESSURE: 114 MMHG | DIASTOLIC BLOOD PRESSURE: 70 MMHG | WEIGHT: 162 LBS | TEMPERATURE: 99.4 F

## 2024-06-13 DIAGNOSIS — L23.7 ALLERGIC CONTACT DERMATITIS DUE TO PLANTS, EXCEPT FOOD: Primary | ICD-10-CM

## 2024-06-13 DIAGNOSIS — K91.2 POSTOPERATIVE MALABSORPTION: ICD-10-CM

## 2024-06-13 PROCEDURE — 96372 THER/PROPH/DIAG INJ SC/IM: CPT | Performed by: INTERNAL MEDICINE

## 2024-06-13 PROCEDURE — 99214 OFFICE O/P EST MOD 30 MIN: CPT | Performed by: INTERNAL MEDICINE

## 2024-06-13 RX ORDER — METHYLPREDNISOLONE ACETATE 80 MG/ML
80 INJECTION, SUSPENSION INTRA-ARTICULAR; INTRALESIONAL; INTRAMUSCULAR; SOFT TISSUE ONCE
Status: COMPLETED | OUTPATIENT
Start: 2024-06-13 | End: 2024-06-13

## 2024-06-13 RX ORDER — LEVOCETIRIZINE DIHYDROCHLORIDE 5 MG/1
5 TABLET, FILM COATED ORAL DAILY
COMMUNITY
Start: 2024-05-03

## 2024-06-13 RX ORDER — TRIAMCINOLONE ACETONIDE 1 MG/G
CREAM TOPICAL 2 TIMES DAILY
Qty: 80 G | Refills: 0 | Status: SHIPPED | OUTPATIENT
Start: 2024-06-13

## 2024-06-13 RX ADMIN — METHYLPREDNISOLONE ACETATE 80 MG: 80 INJECTION, SUSPENSION INTRA-ARTICULAR; INTRALESIONAL; INTRAMUSCULAR; SOFT TISSUE at 15:33

## 2024-06-13 NOTE — PROGRESS NOTES
Assessment/Plan:           1. Allergic contact dermatitis due to plants, except food  Comments:  Depo-Medrol 80 mg IM given.  Triamcinolone cream prescribed.  Orders:  -     triamcinolone (KENALOG) 0.1 % cream; Apply topically 2 (two) times a day  -     methylPREDNISolone acetate (DEPO-MEDROL) injection 80 mg  2. Postoperative malabsorption         1. Allergic contact dermatitis due to plants, except food         No problem-specific Assessment & Plan notes found for this encounter.           Subjective:      Patient ID: Laure Gaxiola is a 53 y.o. female.    HPI    The following portions of the patient's history were reviewed and updated as appropriate: She  has a past medical history of Asthma, Colon polyp, Gastroduodenitis, Headache(784.0) (10 years ago), Intestinal malabsorption, Migraine, Pyonephrosis, UTI (urinary tract infection), and Vitamin D deficiency.  She   Patient Active Problem List    Diagnosis Date Noted    PTSD (post-traumatic stress disorder) 10/27/2022    Concussion wth loss of consciousness of 30 minutes or less 10/26/2022    Head trauma 10/26/2022    Cellulitis of scalp 10/26/2022    Fall down stairs 10/16/2022    Back pain 10/26/2021    Headache 10/26/2021    Cervical strain, acute 10/26/2021    Iron deficiency anemia 05/04/2021    Migraine without status migrainosus, not intractable 05/04/2021    Postoperative malabsorption 05/04/2021    Intestinal malabsorption     Asthma     Vitamin D deficiency      She  has a past surgical history that includes Laparoscopic gastric banding (2005); Gastric bypass (06/2008); [place] tooth extraction; Dilation and curettage of uterus (2017); Abdominal surgery; Tubal ligation; CT cystogram (05/23/2017); CT cystogram (05/23/2017); and Small intestine surgery (2008).  Her family history includes Arthritis in her mother; Asthma in her brother and son; Breast cancer in her family and maternal aunt; COPD in her mother; Cancer in her brother, maternal aunt, maternal  aunt, maternal aunt, maternal aunt, maternal aunt, maternal grandfather, maternal uncle, and maternal uncle; Colon cancer in her family, maternal aunt, and maternal uncle; Coronary artery disease in her mother; Diabetes in her family and father; Gallbladder disease in her family; Glaucoma in her father; Heart disease in her family and maternal grandmother; Hypertension in her family and mother; Kidney cancer in her family; Lung cancer in her family; Ovarian cancer in her family; Prostate cancer in her family and maternal grandfather; Stomach cancer in her family; Thyroid disease in her family and mother.  She  reports that she quit smoking about 4 years ago. Her smoking use included cigarettes. She has never used smokeless tobacco. She reports that she does not currently use alcohol. She reports that she does not use drugs.  Current Outpatient Medications   Medication Sig Dispense Refill    Ascorbic Acid (vitamin C) 1000 MG tablet       BIOTIN PO Take by mouth      Butalbital-APAP-Caffeine (Fioricet) -40 MG CAPS Take 1 tablet by mouth daily as needed (migraine) 15 capsule 0    CALCIUM CITRATE PO Take by mouth      COLLAGEN PO       cyanocobalamin ([facility] Vitamin B-12) 2000 MCG tablet       ferrous sulfate 324 (65 Fe) mg TAKE 1 TABLET (324 MG TOTAL) BY MOUTH 2 (TWO) TIMES A DAY BEFORE MEALS 60 tablet 0    levocetirizine (XYZAL) 5 MG tablet Take 5 mg by mouth daily      Multiple Vitamin (Daily-Isra Multivitamin) TABS Take 1 tablet by mouth daily 90 tablet 3    triamcinolone (KENALOG) 0.1 % cream Apply topically 2 (two) times a day 80 g 0    Vyvanse 60 MG capsule Take 60 mg by mouth daily      cyclobenzaprine (FLEXERIL) 10 mg tablet Take 1 tablet (10 mg total) by mouth 3 (three) times a day as needed for muscle spasms 15 tablet 0    ergocalciferol (VITAMIN D2) 50,000 units Take 1 capsule (50,000 Units total) by mouth once a week 13 capsule 3    promethazine-dextromethorphan (PHENERGAN-DM) 6.25-15 mg/5 mL oral syrup  Take 5 mL by mouth 4 (four) times a day as needed for cough (Patient not taking: Reported on 1/31/2024) 125 mL 0    Semaglutide-Weight Management (WEGOVY) 0.25 MG/0.5ML Inject 0.5 mL (0.25 mg total) under the skin once a week (Patient not taking: Reported on 3/19/2024) 2 mL 0    VITAMIN E PO Take by mouth       No current facility-administered medications for this visit.     Current Outpatient Medications on File Prior to Visit   Medication Sig    Ascorbic Acid (vitamin C) 1000 MG tablet     BIOTIN PO Take by mouth    Butalbital-APAP-Caffeine (Fioricet) -40 MG CAPS Take 1 tablet by mouth daily as needed (migraine)    CALCIUM CITRATE PO Take by mouth    COLLAGEN PO     cyanocobalamin ([facility] Vitamin B-12) 2000 MCG tablet     ferrous sulfate 324 (65 Fe) mg TAKE 1 TABLET (324 MG TOTAL) BY MOUTH 2 (TWO) TIMES A DAY BEFORE MEALS    levocetirizine (XYZAL) 5 MG tablet Take 5 mg by mouth daily    Multiple Vitamin (Daily-Isra Multivitamin) TABS Take 1 tablet by mouth daily    Vyvanse 60 MG capsule Take 60 mg by mouth daily    cyclobenzaprine (FLEXERIL) 10 mg tablet Take 1 tablet (10 mg total) by mouth 3 (three) times a day as needed for muscle spasms    ergocalciferol (VITAMIN D2) 50,000 units Take 1 capsule (50,000 Units total) by mouth once a week    promethazine-dextromethorphan (PHENERGAN-DM) 6.25-15 mg/5 mL oral syrup Take 5 mL by mouth 4 (four) times a day as needed for cough (Patient not taking: Reported on 1/31/2024)    Semaglutide-Weight Management (WEGOVY) 0.25 MG/0.5ML Inject 0.5 mL (0.25 mg total) under the skin once a week (Patient not taking: Reported on 3/19/2024)    VITAMIN E PO Take by mouth    [DISCONTINUED] atorvastatin (LIPITOR) 80 mg tablet Take 80 mg by mouth daily    [DISCONTINUED] clonazePAM (KlonoPIN) 2 mg tablet Take 2 mg by mouth 2 (two) times a day    [DISCONTINUED] gabapentin (NEURONTIN) 800 mg tablet Take 800 mg by mouth 3 (three) times a day    [DISCONTINUED] mirtazapine (REMERON) 45 MG  "tablet Take 45 mg by mouth daily at bedtime    [DISCONTINUED] omeprazole (PriLOSEC) 20 mg delayed release capsule Take 20 mg by mouth daily    [DISCONTINUED] traZODone (DESYREL) 100 mg tablet Take 100 mg by mouth daily at bedtime     No current facility-administered medications on file prior to visit.     There are no discontinued medications.   She has No Known Allergies..    Review of Systems   Constitutional:  Negative for appetite change, chills, fatigue and fever.   HENT:  Negative for sore throat and trouble swallowing.    Eyes:  Negative for redness.   Respiratory:  Negative for shortness of breath.    Cardiovascular:  Negative for chest pain and palpitations.   Gastrointestinal:  Negative for abdominal pain, constipation and diarrhea.   Genitourinary:  Negative for dysuria and hematuria.   Musculoskeletal:  Negative for back pain and neck pain.   Skin:  Positive for rash.   Neurological:  Negative for seizures, weakness and headaches.   Hematological:  Negative for adenopathy.   Psychiatric/Behavioral:  Negative for confusion. The patient is not nervous/anxious.          Objective:      /70 (BP Location: Left arm, Patient Position: Sitting, Cuff Size: Standard)   Pulse 66   Temp 99.4 °F (37.4 °C) (Temporal)   Ht 5' 4\" (1.626 m)   Wt 73.5 kg (162 lb)   SpO2 98%   BMI 27.81 kg/m²     Results Reviewed       None            No results found for this or any previous visit (from the past 1344 hour(s)).     Physical Exam  Constitutional:       General: She is not in acute distress.     Appearance: Normal appearance.   HENT:      Head: Normocephalic and atraumatic.      Nose: Nose normal.      Mouth/Throat:      Mouth: Mucous membranes are moist.   Eyes:      Extraocular Movements: Extraocular movements intact.      Pupils: Pupils are equal, round, and reactive to light.   Cardiovascular:      Rate and Rhythm: Normal rate and regular rhythm.      Pulses: Normal pulses.      Heart sounds: Normal heart " sounds. No murmur heard.     No friction rub.   Pulmonary:      Effort: Pulmonary effort is normal. No respiratory distress.      Breath sounds: Normal breath sounds. No wheezing.   Abdominal:      General: Abdomen is flat. Bowel sounds are normal. There is no distension.      Palpations: Abdomen is soft. There is no mass.      Tenderness: There is no abdominal tenderness. There is no guarding.   Musculoskeletal:         General: Normal range of motion.   Skin:     Findings: Rash present.   Neurological:      General: No focal deficit present.      Mental Status: She is alert and oriented to person, place, and time. Mental status is at baseline.      Cranial Nerves: No cranial nerve deficit.   Psychiatric:         Mood and Affect: Mood normal.         Behavior: Behavior normal.

## 2024-09-21 DIAGNOSIS — L23.7 ALLERGIC CONTACT DERMATITIS DUE TO PLANTS, EXCEPT FOOD: ICD-10-CM

## 2024-09-21 RX ORDER — TRIAMCINOLONE ACETONIDE 1 MG/G
1 CREAM TOPICAL 2 TIMES DAILY
Qty: 80 G | Refills: 0 | Status: SHIPPED | OUTPATIENT
Start: 2024-09-21

## 2024-12-17 ENCOUNTER — TELEPHONE (OUTPATIENT)
Age: 54
End: 2024-12-17

## 2024-12-17 NOTE — TELEPHONE ENCOUNTER
Patient of Dr Finney calling about bilateral abdominal pain. She states that when she bends over she has discomfort on both sides of abdomen around her bellybutton. She cannot feel a bulge but does describe feeling bloated and feeling like her intestines are twisting. She stated that it has been ongoing but she has noticed that it has become worse recently. She denies fever, n/v at this time. She does state that she had a BM today.    Patient was offered appointment for tomorrow but unable to make it due to prior commitments. She was given an appointment for Monday 12/23. She stated she would only see Dr Finney as she has a history of bariatric surgery and had a subsequent hernia repaired by Dr Finney a few years ago.    Patient understands that she can utilize ED if she has significant changes including increased pain, n/v, fever, etc.    #[phone]

## 2024-12-17 NOTE — TELEPHONE ENCOUNTER
Patient complained of significant abdominal pain, worse when bending. Offered an appointment tomorrow 12/18/24 with Dr. Finney but patient has a job interview. She requested to speak to our nurse. Made a warm transfer of the call to TRIXIE Alcala RN.

## 2024-12-23 NOTE — TELEPHONE ENCOUNTER
Patient called in because she cancelled today's appt and wanted to make sure she is on the waiting list.  Changed to high priority

## 2025-01-11 ENCOUNTER — NURSE TRIAGE (OUTPATIENT)
Dept: OTHER | Facility: OTHER | Age: 55
End: 2025-01-11

## 2025-01-11 NOTE — TELEPHONE ENCOUNTER
"Reason for Disposition   [1] MILD-MODERATE pain AND [2] constant AND [3] present > 2 hours    Answer Assessment - Initial Assessment Questions  1. LOCATION: \"Where does it hurt?\"       Under left ribcage and few hours later radiating to right ribcage, breast and back    2. RADIATION: \"Does the pain shoot anywhere else?\" (e.g., chest, back)      Left to right and lower back    3. ONSET: \"When did the pain begin?\" (e.g., minutes, hours or days ago)       [place] this morning around 4 am    4. SUDDEN: \"Gradual or sudden onset?\"      Sudden onset    5. PATTERN \"Does the pain come and go, or is it constant?\"      Constant    6. SEVERITY: \"How bad is the pain?\"  (e.g., Scale 1-10; mild, moderate, or severe)      Moderate     7. RECURRENT SYMPTOM: \"Have you ever had this type of stomach pain before?\" If Yes, ask: \"When was the last time?\" and \"What happened that time?\"       Denies    8. CAUSE: \"What do you think is causing the stomach pain?\"      Unknown    9. RELIEVING/AGGRAVATING FACTORS: \"What makes it better or worse?\" (e.g., antacids, bending or twisting motion, bowel movement)      Worsens with deep breaths    10. OTHER SYMPTOMS: \"Do you have any other symptoms?\" (e.g., back pain, diarrhea, fever, urination pain, vomiting)        Nausea, burping, lower back pain, very soft stools    Protocols used: Abdominal Pain - Female-Adult-AH    "

## 2025-01-13 ENCOUNTER — OFFICE VISIT (OUTPATIENT)
Dept: SURGERY | Facility: CLINIC | Age: 55
End: 2025-01-13
Payer: COMMERCIAL

## 2025-01-13 ENCOUNTER — APPOINTMENT (OUTPATIENT)
Dept: LAB | Facility: HOSPITAL | Age: 55
End: 2025-01-13
Attending: SURGERY
Payer: COMMERCIAL

## 2025-01-13 VITALS
WEIGHT: 168 LBS | BODY MASS INDEX: 28.68 KG/M2 | TEMPERATURE: 96.7 F | HEART RATE: 73 BPM | HEIGHT: 64 IN | OXYGEN SATURATION: 98 % | DIASTOLIC BLOOD PRESSURE: 74 MMHG | SYSTOLIC BLOOD PRESSURE: 102 MMHG

## 2025-01-13 DIAGNOSIS — Z98.84 S/P BARIATRIC SURGERY: ICD-10-CM

## 2025-01-13 DIAGNOSIS — Z01.812 PRE-PROCEDURAL LABORATORY EXAMINATIONS: ICD-10-CM

## 2025-01-13 DIAGNOSIS — R10.9 ABDOMINAL PAIN: Primary | ICD-10-CM

## 2025-01-13 DIAGNOSIS — R10.33 PERIUMBILICAL ABDOMINAL PAIN: Primary | ICD-10-CM

## 2025-01-13 LAB
ALBUMIN SERPL BCG-MCNC: 4.3 G/DL (ref 3.5–5)
ALP SERPL-CCNC: 95 U/L (ref 34–104)
ALT SERPL W P-5'-P-CCNC: 18 U/L (ref 7–52)
ANION GAP SERPL CALCULATED.3IONS-SCNC: 7 MMOL/L (ref 4–13)
AST SERPL W P-5'-P-CCNC: 20 U/L (ref 13–39)
BILIRUB SERPL-MCNC: 0.3 MG/DL (ref 0.2–1)
BUN SERPL-MCNC: 14 MG/DL (ref 5–25)
CALCIUM SERPL-MCNC: 9.5 MG/DL (ref 8.4–10.2)
CHLORIDE SERPL-SCNC: 101 MMOL/L (ref 96–108)
CO2 SERPL-SCNC: 30 MMOL/L (ref 21–32)
CREAT SERPL-MCNC: 0.71 MG/DL (ref 0.6–1.3)
GFR SERPL CREATININE-BSD FRML MDRD: 98 ML/MIN/1.73SQ M
GLUCOSE P FAST SERPL-MCNC: 112 MG/DL (ref 65–99)
POTASSIUM SERPL-SCNC: 4.1 MMOL/L (ref 3.5–5.3)
PROT SERPL-MCNC: 7.3 G/DL (ref 6.4–8.4)
SODIUM SERPL-SCNC: 138 MMOL/L (ref 135–147)

## 2025-01-13 PROCEDURE — 36415 COLL VENOUS BLD VENIPUNCTURE: CPT

## 2025-01-13 PROCEDURE — 80053 COMPREHEN METABOLIC PANEL: CPT

## 2025-01-13 PROCEDURE — 99214 OFFICE O/P EST MOD 30 MIN: CPT | Performed by: SURGERY

## 2025-01-13 NOTE — PROGRESS NOTES
Name: Laure Gaxiola      : 1970      MRN: [id number]  Encounter Provider: Howie Finney MD  Encounter Date: 2025   Encounter department: [facility] SURG[facility] [place]  :  Assessment & Plan  Periumbilical abdominal pain       Patient has history of gastric bypass and prior repair of internal hernia.  I am sending her of CT abdomen pelvis with oral and IV contrast to rule out any internal herniation.  She will see me after the CT scan has been done.  I told her to come to the emergency department if she starts having recurrence of pain, nausea, vomiting, dry heaving.      History of Present Illness   54-year-old female patient who is well-known to me came with complaints of abdominal pain.  She says she felt it like a stabbing pain 3 days ago.  It was associated with dry heaving.  The pain was present in the left upper quadrant and right upper quadrant.  No fever.  She is passing flatus.  The pain has improved considerably now.  She is able to tolerate diet.  Patient has a history of gastric banding in  followed by a gastric bypass in .  She also has history of diagnostic laparoscopy converted to exploratory laparotomy with repair of internal hernia in .  Patient's last colonoscopy was in  which was normal.  She has no burning micturition.  No complaints of blood in stools or melenic stools.  Patient tells me that she had abdominoplasty done in .      Laure Gaxiola is a 54 y.o. female who presents   History obtained from: patient    Review of Systems   Constitutional: Negative.    HENT: Negative.     Eyes: Negative.    Respiratory: Negative.     Cardiovascular: Negative.    Gastrointestinal:  Positive for abdominal pain.   Endocrine: Negative.    Genitourinary: Negative.    Musculoskeletal: Negative.    Skin: Negative.    Allergic/Immunologic: Negative.    Neurological: Negative.    Hematological: Negative.    Psychiatric/Behavioral: Negative.       Medical History Reviewed by  provider this encounter:  Tobacco  Allergies  Meds  Problems  Med Hx  Surg Hx  Fam Hx     .  Past Medical History   Past Medical History:   Diagnosis Date    Asthma     Colon polyp     Gastroduodenitis     Headache(784.0) 10 years ago    Intestinal malabsorption     Migraine     Pyonephrosis     UTI (urinary tract infection)     Vitamin D deficiency      Past Surgical History:   Procedure Laterality Date    ABDOMINAL SURGERY      Twisted bowel and internal hernia    CT CYSTOGRAM  05/23/2017    CT CYSTOGRAM  05/23/2017    DILATION AND CURETTAGE OF UTERUS  2017    GASTRIC BYPASS  06/2008    LAPAROSCOPIC GASTRIC BANDING  2005    SMALL INTESTINE SURGERY  2008    Gastro by pass    TUBAL LIGATION      WISDOM TOOTH EXTRACTION       Family History   Problem Relation Age of Onset    Thyroid disease Mother     Coronary artery disease Mother     Hypertension Mother     COPD Mother     Arthritis Mother     Diabetes Father     Glaucoma Father     Heart disease Family     Diabetes Family     Hypertension Family     Breast cancer Family     Colon cancer Family     Prostate cancer Family     Gallbladder disease Family     Stomach cancer Family     Ovarian cancer Family     Kidney cancer Family     Lung cancer Family     Thyroid disease Family     Prostate cancer Maternal Grandfather     Cancer Maternal Grandfather         Prostate    Heart disease Maternal Grandmother     Asthma Brother     Cancer Brother         Kidney    Asthma Son     Colon cancer Maternal Aunt     Cancer Maternal Aunt         Colon    Colon cancer Maternal Uncle     Cancer Maternal Uncle         Colon    Breast cancer Maternal Aunt     Cancer Maternal Aunt         Brest    Cancer Maternal Aunt         Thyroid    Cancer Maternal Aunt         Thyroid and lungs    Cancer Maternal Aunt         Lymph, stomach    Cancer Maternal Uncle         Thyroid      reports that she quit smoking about 4 years ago. Her smoking use included cigarettes. She has never used  smokeless tobacco. She reports that she does not currently use alcohol. She reports that she does not use drugs.  Current Outpatient Medications on File Prior to Visit   Medication Sig Dispense Refill    Ascorbic Acid (vitamin C) 1000 MG tablet       BIOTIN PO Take by mouth      CALCIUM CITRATE PO Take by mouth      COLLAGEN PO       cyanocobalamin ([facility] Vitamin B-12) 2000 MCG tablet       ergocalciferol (VITAMIN D2) 50,000 units Take 1 capsule (50,000 Units total) by mouth once a week 13 capsule 3    ferrous sulfate 324 (65 Fe) mg TAKE 1 TABLET (324 MG TOTAL) BY MOUTH 2 (TWO) TIMES A DAY BEFORE MEALS 60 tablet 0    Multiple Vitamin (Daily-Isra Multivitamin) TABS Take 1 tablet by mouth daily 90 tablet 3    Vyvanse 60 MG capsule Take 60 mg by mouth daily      Butalbital-APAP-Caffeine (Fioricet) -40 MG CAPS Take 1 tablet by mouth daily as needed (migraine) (Patient not taking: Reported on 1/13/2025) 15 capsule 0    cyclobenzaprine (FLEXERIL) 10 mg tablet Take 1 tablet (10 mg total) by mouth 3 (three) times a day as needed for muscle spasms (Patient not taking: Reported on 1/13/2025) 15 tablet 0    levocetirizine (XYZAL) 5 MG tablet Take 5 mg by mouth daily      promethazine-dextromethorphan (PHENERGAN-DM) 6.25-15 mg/5 mL oral syrup Take 5 mL by mouth 4 (four) times a day as needed for cough (Patient not taking: Reported on 1/31/2024) 125 mL 0    Semaglutide-Weight Management (WEGOVY) 0.25 MG/0.5ML Inject 0.5 mL (0.25 mg total) under the skin once a week (Patient not taking: Reported on 3/19/2024) 2 mL 0    triamcinolone (KENALOG) 0.1 % cream APPLY TO AFFECTED AREA TWICE A DAY (Patient not taking: Reported on 1/13/2025) 80 g 0    VITAMIN E PO Take by mouth (Patient not taking: Reported on 1/13/2025)      [DISCONTINUED] atorvastatin (LIPITOR) 80 mg tablet Take 80 mg by mouth daily      [DISCONTINUED] clonazePAM (KlonoPIN) 2 mg tablet Take 2 mg by mouth 2 (two) times a day      [DISCONTINUED] gabapentin (NEURONTIN)  800 mg tablet Take 800 mg by mouth 3 (three) times a day      [DISCONTINUED] mirtazapine (REMERON) 45 MG tablet Take 45 mg by mouth daily at bedtime      [DISCONTINUED] omeprazole (PriLOSEC) 20 mg delayed release capsule Take 20 mg by mouth daily      [DISCONTINUED] traZODone (DESYREL) 100 mg tablet Take 100 mg by mouth daily at bedtime       No current facility-administered medications on file prior to visit.   No Known Allergies   Current Outpatient Medications on File Prior to Visit   Medication Sig Dispense Refill    Ascorbic Acid (vitamin C) 1000 MG tablet       BIOTIN PO Take by mouth      CALCIUM CITRATE PO Take by mouth      COLLAGEN PO       cyanocobalamin ([employer] Vitamin B-12) 2000 MCG tablet       ergocalciferol (VITAMIN D2) 50,000 units Take 1 capsule (50,000 Units total) by mouth once a week 13 capsule 3    ferrous sulfate 324 (65 Fe) mg TAKE 1 TABLET (324 MG TOTAL) BY MOUTH 2 (TWO) TIMES A DAY BEFORE MEALS 60 tablet 0    Multiple Vitamin (Daily-Isra Multivitamin) TABS Take 1 tablet by mouth daily 90 tablet 3    Vyvanse 60 MG capsule Take 60 mg by mouth daily      Butalbital-APAP-Caffeine (Fioricet) -40 MG CAPS Take 1 tablet by mouth daily as needed (migraine) (Patient not taking: Reported on 1/13/2025) 15 capsule 0    cyclobenzaprine (FLEXERIL) 10 mg tablet Take 1 tablet (10 mg total) by mouth 3 (three) times a day as needed for muscle spasms (Patient not taking: Reported on 1/13/2025) 15 tablet 0    levocetirizine (XYZAL) 5 MG tablet Take 5 mg by mouth daily      promethazine-dextromethorphan (PHENERGAN-DM) 6.25-15 mg/5 mL oral syrup Take 5 mL by mouth 4 (four) times a day as needed for cough (Patient not taking: Reported on 1/31/2024) 125 mL 0    Semaglutide-Weight Management (WEGOVY) 0.25 MG/0.5ML Inject 0.5 mL (0.25 mg total) under the skin once a week (Patient not taking: Reported on 3/19/2024) 2 mL 0    triamcinolone (KENALOG) 0.1 % cream APPLY TO AFFECTED AREA TWICE A DAY (Patient not taking:  "Reported on 2025) 80 g 0    VITAMIN E PO Take by mouth (Patient not taking: Reported on 2025)      [DISCONTINUED] atorvastatin (LIPITOR) 80 mg tablet Take 80 mg by mouth daily      [DISCONTINUED] clonazePAM (KlonoPIN) 2 mg tablet Take 2 mg by mouth 2 (two) times a day      [DISCONTINUED] gabapentin (NEURONTIN) 800 mg tablet Take 800 mg by mouth 3 (three) times a day      [DISCONTINUED] mirtazapine (REMERON) 45 MG tablet Take 45 mg by mouth daily at bedtime      [DISCONTINUED] omeprazole (PriLOSEC) 20 mg delayed release capsule Take 20 mg by mouth daily      [DISCONTINUED] traZODone (DESYREL) 100 mg tablet Take 100 mg by mouth daily at bedtime       No current facility-administered medications on file prior to visit.      Social History     Tobacco Use    Smoking status: Former     Current packs/day: 0.00     Types: Cigarettes     Quit date: 2020     Years since quittin.9    Smokeless tobacco: Never    Tobacco comments:     Social smoker   Vaping Use    Vaping status: Never Used   Substance and Sexual Activity    Alcohol use: Not Currently     Comment: Social    Drug use: Never     Comment: no drugs    Sexual activity: Yes        Objective   /74 (BP Location: Left arm, Patient Position: Sitting, Cuff Size: Standard)   Pulse 73   Temp (!) 96.7 °F (35.9 °C) (Tympanic)   Ht 5' 4\" (1.626 m)   Wt 76.2 kg (168 lb)   SpO2 98%   BMI 28.84 kg/m²      Physical Exam  Vitals reviewed.   Constitutional:       Appearance: Normal appearance.   HENT:      Head: Normocephalic.      Nose: Nose normal.   Eyes:      Pupils: Pupils are equal, round, and reactive to light.   Cardiovascular:      Rate and Rhythm: Normal rate and regular rhythm.   Pulmonary:      Effort: Pulmonary effort is normal.      Breath sounds: Normal breath sounds.   Abdominal:      General: Abdomen is flat. There is no distension.      Palpations: Abdomen is soft. There is no mass.      Tenderness: There is no abdominal tenderness. " There is no guarding or rebound.      Hernia: No hernia is present.   Musculoskeletal:         General: Normal range of motion.      Cervical back: Normal range of motion.   Skin:     General: Skin is warm.   Neurological:      General: No focal deficit present.      Mental Status: She is alert and oriented to person, place, and time.   Psychiatric:         Mood and Affect: Mood normal.         Administrative Statements   I have spent a total time of 25 minutes in caring for this patient on the day of the visit/encounter including Diagnostic results, Prognosis, Risks and benefits of tx options, Instructions for management, Patient and family education, Importance of tx compliance, Risk factor reductions, Impressions, Counseling / Coordination of care, Documenting in the medical record, Reviewing / ordering tests, medicine, procedures  , Obtaining or reviewing history  , and Communicating with other healthcare professionals .

## 2025-01-20 ENCOUNTER — HOSPITAL ENCOUNTER (OUTPATIENT)
Dept: CT IMAGING | Facility: HOSPITAL | Age: 55
Discharge: HOME | End: 2025-01-20
Attending: SURGERY
Payer: COMMERCIAL

## 2025-01-20 DIAGNOSIS — R10.9 ABDOMINAL PAIN: ICD-10-CM

## 2025-01-20 DIAGNOSIS — Z98.84 S/P BARIATRIC SURGERY: ICD-10-CM

## 2025-01-20 PROCEDURE — 74177 CT ABD & PELVIS W/CONTRAST: CPT

## 2025-01-20 RX ADMIN — IOHEXOL 50 ML: 350 INJECTION, SOLUTION INTRAVENOUS at 10:07

## 2025-01-21 DIAGNOSIS — Z00.6 ENCOUNTER FOR EXAMINATION FOR NORMAL COMPARISON OR CONTROL IN CLINICAL RESEARCH PROGRAM: ICD-10-CM

## 2025-02-01 ENCOUNTER — APPOINTMENT (OUTPATIENT)
Dept: LAB | Facility: CLINIC | Age: 55
End: 2025-02-01

## 2025-02-01 DIAGNOSIS — Z00.6 ENCOUNTER FOR EXAMINATION FOR NORMAL COMPARISON OR CONTROL IN CLINICAL RESEARCH PROGRAM: ICD-10-CM

## 2025-02-01 PROCEDURE — 36415 COLL VENOUS BLD VENIPUNCTURE: CPT

## 2025-02-03 ENCOUNTER — TELEPHONE (OUTPATIENT)
Age: 55
End: 2025-02-03

## 2025-02-03 DIAGNOSIS — R92.8 ABNORMAL MAMMOGRAM: Primary | ICD-10-CM

## 2025-02-03 NOTE — TELEPHONE ENCOUNTER
Yudy from  [facility] calling.  Pt had an abnormal mammogram.  They scheduled her to come back for some diagnostic imaging tomorrow afternoon.  Can she get the ordered faxed today to her.  [phone] fax  They need a right diagnostic mammogram and a right limited breast US R92.8 dx code for abnormal mammogram.

## 2025-02-04 ENCOUNTER — RESULTS FOLLOW-UP (OUTPATIENT)
Dept: SURGERY | Facility: CLINIC | Age: 55
End: 2025-02-04

## 2025-02-05 ENCOUNTER — TELEPHONE (OUTPATIENT)
Dept: INTERNAL MEDICINE CLINIC | Facility: CLINIC | Age: 55
End: 2025-02-05

## 2025-02-13 LAB
APOB+LDLR+PCSK9 GENE MUT ANL BLD/T: NOT DETECTED
BRCA1+BRCA2 DEL+DUP + FULL MUT ANL BLD/T: NOT DETECTED
MLH1+MSH2+MSH6+PMS2 GN DEL+DUP+FUL M: NOT DETECTED

## 2025-03-11 ENCOUNTER — TELEPHONE (OUTPATIENT)
Dept: BARIATRICS | Facility: CLINIC | Age: 55
End: 2025-03-11

## 2025-03-17 DIAGNOSIS — L23.7 ALLERGIC CONTACT DERMATITIS DUE TO PLANTS, EXCEPT FOOD: ICD-10-CM

## 2025-03-17 RX ORDER — TRIAMCINOLONE ACETONIDE 1 MG/G
1 CREAM TOPICAL 2 TIMES DAILY
Qty: 80 G | Refills: 0 | Status: SHIPPED | OUTPATIENT
Start: 2025-03-17

## 2025-04-17 ENCOUNTER — OFFICE VISIT (OUTPATIENT)
Dept: INTERNAL MEDICINE CLINIC | Facility: CLINIC | Age: 55
End: 2025-04-17
Payer: COMMERCIAL

## 2025-04-17 VITALS
SYSTOLIC BLOOD PRESSURE: 108 MMHG | TEMPERATURE: 97.4 F | OXYGEN SATURATION: 97 % | HEIGHT: 64 IN | WEIGHT: 169.4 LBS | DIASTOLIC BLOOD PRESSURE: 68 MMHG | HEART RATE: 94 BPM | BODY MASS INDEX: 28.92 KG/M2

## 2025-04-17 DIAGNOSIS — K91.2 POSTOPERATIVE MALABSORPTION: ICD-10-CM

## 2025-04-17 DIAGNOSIS — G43.909 MIGRAINE WITHOUT STATUS MIGRAINOSUS, NOT INTRACTABLE, UNSPECIFIED MIGRAINE TYPE: ICD-10-CM

## 2025-04-17 DIAGNOSIS — R73.01 IMPAIRED FASTING BLOOD SUGAR: ICD-10-CM

## 2025-04-17 DIAGNOSIS — Z00.01 ENCOUNTER FOR GENERAL ADULT MEDICAL EXAMINATION WITH ABNORMAL FINDINGS: Primary | ICD-10-CM

## 2025-04-17 DIAGNOSIS — K90.9 INTESTINAL MALABSORPTION, UNSPECIFIED TYPE: ICD-10-CM

## 2025-04-17 DIAGNOSIS — H92.02 EAR DISCOMFORT, LEFT: ICD-10-CM

## 2025-04-17 DIAGNOSIS — D50.9 IRON DEFICIENCY ANEMIA, UNSPECIFIED IRON DEFICIENCY ANEMIA TYPE: ICD-10-CM

## 2025-04-17 DIAGNOSIS — R10.13 EPIGASTRIC PAIN: ICD-10-CM

## 2025-04-17 PROBLEM — R10.9 ABDOMINAL PAIN: Status: ACTIVE | Noted: 2025-04-17

## 2025-04-17 PROCEDURE — 99396 PREV VISIT EST AGE 40-64: CPT | Performed by: INTERNAL MEDICINE

## 2025-04-17 RX ORDER — VILOXAZINE HYDROCHLORIDE 200 MG/1
2 CAPSULE, EXTENDED RELEASE ORAL DAILY
COMMUNITY
Start: 2025-03-28

## 2025-04-17 RX ORDER — LORAZEPAM 0.5 MG/1
0.5 TABLET ORAL DAILY PRN
COMMUNITY
Start: 2025-01-30

## 2025-04-17 NOTE — PROGRESS NOTES
Name: Laure Gaxiola      : 1970      MRN: [id number]  Encounter Provider: Morgan Mendoza MD  Encounter Date: 2025   Encounter department: [facility]    :  Assessment & Plan  Encounter for general adult medical examination with abnormal findings  - Medical concerns discussed and physical exam performed  Orders:    CBC and differential; Future    Comprehensive metabolic panel; Future    Lipid panel; Future    Urinalysis with microscopic; Future    TSH, 3rd generation; Future    Iron deficiency anemia, unspecified iron deficiency anemia type  -  iron panel not followed up on, will reorder               Postoperative malabsorption  - Patient continues to have constipation and diarrhea  - Patient did not appear to follow-up on last year's vitamin levels.  Reordered and requested follow-through.  Continue supplementation    Migraine without status migrainosus, not intractable, unspecified migraine type  - Patient used to take Fioricet, can restart if patient desires       Impaired fasting blood sugar  - 2024 A1c of 5.7, and 2025 fasting glucose of 112  -Will order follow-up labs  Orders:    Hemoglobin A1C; Future    Ear discomfort, left  - Multiple years of symptoms for which patient was previously referred to ENT, raise concern for eustachian tube dysfunction and provided patient with detailed counseling materials.  Patient's symptoms continue but follow-through concerning  - Recommend adhesion to Flonase and Xyzal and follow-up with ENT       Epigastric pain  - The patient was following with general surgery in January for periumbilical/right-sided abdominal pain.  At that time there was concerned about internal hernias which were ruled out on CT scan.  Patient is to follow-up with general surgery per their note.  She has been told to go to the ED in case of worsening symptoms                    History of Present Illness     The patient is a 54-year-old female with a  history of BRENDA, asthma, gastric bypass, head trauma with subsequent left-sided hematoma, and migraines who presents for a yearly physical exam.  She reports feeling well overall.  She endorses that for around 1 year she has had a difficult to describe periumbilical/right sided abdominal pain that is exacerbated by leaning forward.  It is not affected by food consumption.  She had seen general surgery and was worked up with a CT scan for internal hernia, however this was negative.  She has the pain most days and it self resolves.  She also endorses discomfort of the left ear for multiple years for which she had previously seen ENT for concern for eustachian tube dysfunction.  Sounds including music exacerbate the discomfort which caused her to hear a clicking sound in the left ear.  For around 2 months she has had cramping pain of the bilateral forearms and legs that makes some day-to-day activities difficult.  Patient does admit to not drinking enough water.  The patient also endorses a mildly painful nodule on her left foot. She also endorses chronic constipation with subsequent diarrhea.  She continues to have migraine headaches.  She denies having any chest pain or heart palpitations, nausea or vomiting, cough or shortness of breath, fevers or chills.          Review of Systems   Constitutional:  Negative for chills and fever.   HENT:  Positive for ear pain. Negative for facial swelling.    Respiratory:  Negative for cough and shortness of breath.    Cardiovascular:  Negative for chest pain and palpitations.   Gastrointestinal:  Positive for abdominal pain, constipation and diarrhea. Negative for nausea and vomiting.   Musculoskeletal:  Positive for myalgias. Negative for arthralgias and joint swelling.   Neurological:  Positive for headaches. Negative for light-headedness.     Past Medical History:   Diagnosis Date    ADHD (attention deficit hyperactivity disorder)     Asthma     Colon polyp     Gastroduodenitis      Head injury     Headache(784.0) 10 years ago    Intestinal malabsorption     Migraine     Peptic ulceration 7    PTSD (post-traumatic stress disorder)     Pyonephrosis     Sleep difficulties     UTI (urinary tract infection)     Vitamin D deficiency      Past Surgical History:   Procedure Laterality Date    ABDOMINAL SURGERY      Twisted bowel and internal hernia    BREAST SURGERY      CT CYSTOGRAM  05/23/2017    CT CYSTOGRAM  05/23/2017    DILATION AND CURETTAGE OF UTERUS  2017    GASTRIC BYPASS  06/2008    LAPAROSCOPIC GASTRIC BANDING  2005    SMALL INTESTINE SURGERY  2008    Gastro by pass    TUBAL LIGATION      WISDOM TOOTH EXTRACTION       Family History   Problem Relation Age of Onset    Thyroid disease Mother     Coronary artery disease Mother     Hypertension Mother     COPD Mother     Arthritis Mother     Depression Mother     Migraines Mother     Diabetes Father     Glaucoma Father     Heart disease Family     Diabetes Family     Hypertension Family     Breast cancer Family     Colon cancer Family     Prostate cancer Family     Gallbladder disease Family     Stomach cancer Family     Ovarian cancer Family     Kidney cancer Family     Lung cancer Family     Thyroid disease Family     Prostate cancer Maternal Grandfather     Cancer Maternal Grandfather         Prostate    Heart disease Maternal Grandmother     Asthma Brother     Cancer Brother         Kidney    Asthma Son     Colon cancer Maternal Aunt     Cancer Maternal Aunt         Colon    Breast cancer Maternal Aunt     Colon cancer Maternal Uncle     Cancer Maternal Uncle         Colon    Breast cancer Maternal Aunt     Cancer Maternal Aunt         Brest    Cancer Maternal Aunt         Thyroid    Cancer Maternal Aunt         Thyroid and lungs    Cancer Maternal Aunt         Lymph, stomach    Cancer Maternal Uncle         Thyroid    GI problems Sister     Seizures Daughter      Social History     Tobacco Use    Smoking status: Former     Current  packs/day: 0.00     Types: Cigarettes     Quit date: 2020     Years since quittin.2    Smokeless tobacco: Never    Tobacco comments:     Social smoker   Vaping Use    Vaping status: Never Used   Substance and Sexual Activity    Alcohol use: Not Currently     Comment: Social    Drug use: Never     Comment: no drugs    Sexual activity: Yes     Current Outpatient Medications on File Prior to Visit   Medication Sig    Ascorbic Acid (vitamin C) 1000 MG tablet     CALCIUM CITRATE PO Take by mouth    cyanocobalamin ([employer] Vitamin B-12) 2000 MCG tablet     ergocalciferol (VITAMIN D2) 50,000 units Take 1 capsule (50,000 Units total) by mouth once a week    LORazepam (ATIVAN) 0.5 mg tablet Take 0.5 mg by mouth daily as needed for anxiety    Multiple Vitamin (Daily-Isra Multivitamin) TABS Take 1 tablet by mouth daily    Qelbree 200 MG CP24 Take 2 capsules by mouth in the morning    Vyvanse 60 MG capsule Take 60 mg by mouth daily    BIOTIN PO Take by mouth (Patient not taking: Reported on 2025)    Butalbital-APAP-Caffeine (Fioricet) -40 MG CAPS Take 1 tablet by mouth daily as needed (migraine) (Patient not taking: Reported on 2025)    COLLAGEN PO  (Patient not taking: Reported on 2025)    cyclobenzaprine (FLEXERIL) 10 mg tablet Take 1 tablet (10 mg total) by mouth 3 (three) times a day as needed for muscle spasms (Patient not taking: Reported on 2025)    ferrous sulfate 324 (65 Fe) mg TAKE 1 TABLET (324 MG TOTAL) BY MOUTH 2 (TWO) TIMES A DAY BEFORE MEALS (Patient not taking: Reported on 2025)    levocetirizine (XYZAL) 5 MG tablet Take 5 mg by mouth daily (Patient not taking: Reported on 2025)    promethazine-dextromethorphan (PHENERGAN-DM) 6.25-15 mg/5 mL oral syrup Take 5 mL by mouth 4 (four) times a day as needed for cough (Patient not taking: Reported on 2024)    Semaglutide-Weight Management (WEGOVY) 0.25 MG/0.5ML Inject 0.5 mL (0.25 mg total) under the skin once a week  "(Patient not taking: Reported on 3/19/2024)    triamcinolone (KENALOG) 0.1 % cream APPLY TO AFFECTED AREA TWICE A DAY (Patient not taking: Reported on 4/17/2025)    VITAMIN E PO Take by mouth (Patient not taking: Reported on 1/13/2025)    [DISCONTINUED] atorvastatin (LIPITOR) 80 mg tablet Take 80 mg by mouth daily    [DISCONTINUED] clonazePAM (KlonoPIN) 2 mg tablet Take 2 mg by mouth 2 (two) times a day    [DISCONTINUED] gabapentin (NEURONTIN) 800 mg tablet Take 800 mg by mouth 3 (three) times a day    [DISCONTINUED] mirtazapine (REMERON) 45 MG tablet Take 45 mg by mouth daily at bedtime    [DISCONTINUED] omeprazole (PriLOSEC) 20 mg delayed release capsule Take 20 mg by mouth daily    [DISCONTINUED] traZODone (DESYREL) 100 mg tablet Take 100 mg by mouth daily at bedtime     No Known Allergies  Immunization History   Administered Date(s) Administered    INFLUENZA 08/06/2014    Tdap 01/06/2021, 10/16/2022     Objective   /68 (BP Location: Left arm, Patient Position: Sitting, Cuff Size: Standard)   Pulse 94   Temp (!) 97.4 °F (36.3 °C) (Temporal)   Ht 5' 3.78\" (1.62 m)   Wt 76.8 kg (169 lb 6.4 oz)   SpO2 97%   BMI 29.28 kg/m²       Physical Exam  Constitutional:       General: She is not in acute distress.     Appearance: She is not ill-appearing.   HENT:      Right Ear: Tympanic membrane, ear canal and external ear normal.      Left Ear: Tympanic membrane, ear canal and external ear normal.      Mouth/Throat:      Mouth: Mucous membranes are moist.      Pharynx: Oropharynx is clear. No oropharyngeal exudate or posterior oropharyngeal erythema.   Cardiovascular:      Rate and Rhythm: Regular rhythm. Tachycardia present.      Pulses: Normal pulses.      Heart sounds: No murmur heard.     Comments: DP pulse intact bilaterally  Pulmonary:      Effort: Pulmonary effort is normal. No respiratory distress.      Breath sounds: Normal breath sounds. No stridor. No wheezing, rhonchi or rales.   Abdominal:      " General: Bowel sounds are normal. There is no distension.      Palpations: Abdomen is soft.      Tenderness: There is abdominal tenderness.      Comments: Mild tenderness to palpation of the right lower quadrant   Musculoskeletal:         General: Deformity present.      Cervical back: No tenderness.      Right lower leg: No edema.      Left lower leg: No edema.      Comments: Mildly painful left foot nodule   Lymphadenopathy:      Cervical: No cervical adenopathy.   Skin:     General: Skin is warm and dry.      Capillary Refill: Capillary refill takes less than 2 seconds.      Coloration: Skin is not jaundiced or pale.   Neurological:      Mental Status: She is alert and oriented to person, place, and time.

## 2025-04-17 NOTE — ASSESSMENT & PLAN NOTE
- Multiple years of symptoms for which patient was previously referred to ENT, raise concern for eustachian tube dysfunction and provided patient with detailed counseling materials.  Patient's symptoms continue but follow-through concerning  - Recommend adhesion to Flonase and Xyzal and follow-up with ENT

## 2025-04-17 NOTE — ASSESSMENT & PLAN NOTE
- Patient continues to have constipation and diarrhea  - Patient did not appear to follow-up on last year's vitamin levels.  Reordered and requested follow-through.  Continue supplementation

## 2025-04-17 NOTE — ASSESSMENT & PLAN NOTE
- The patient was following with general surgery in January for periumbilical/right-sided abdominal pain.  At that time there was concerned about internal hernias which were ruled out on CT scan.  Patient is to follow-up with general surgery per their note.  She has been told to go to the ED in case of worsening symptoms

## 2025-05-04 LAB
25(OH)D3 SERPL-MCNC: 60 NG/ML (ref 30–100)
ALBUMIN SERPL-MCNC: 4.3 G/DL (ref 3.6–5.1)
ALBUMIN/GLOB SERPL: 1.7 (CALC) (ref 1–2.5)
ALP SERPL-CCNC: 91 U/L (ref 37–153)
ALT SERPL-CCNC: 19 U/L (ref 6–29)
APPEARANCE UR: CLEAR
AST SERPL-CCNC: 24 U/L (ref 10–35)
BACTERIA UR QL AUTO: ABNORMAL /HPF
BASOPHILS # BLD AUTO: 70 CELLS/UL (ref 0–200)
BASOPHILS NFR BLD AUTO: 1.8 %
BILIRUB SERPL-MCNC: 0.5 MG/DL (ref 0.2–1.2)
BILIRUB UR QL STRIP: NEGATIVE
BUN SERPL-MCNC: 13 MG/DL (ref 7–25)
BUN/CREAT SERPL: NORMAL (CALC) (ref 6–22)
CALCIUM SERPL-MCNC: 9.2 MG/DL (ref 8.6–10.4)
CALCIUM SERPL-MCNC: 9.2 MG/DL (ref 8.6–10.4)
CHLORIDE SERPL-SCNC: 105 MMOL/L (ref 98–110)
CHOLEST SERPL-MCNC: 195 MG/DL
CHOLEST/HDLC SERPL: 2.1 (CALC)
CO2 SERPL-SCNC: 27 MMOL/L (ref 20–32)
COLOR UR: YELLOW
CREAT SERPL-MCNC: 0.65 MG/DL (ref 0.5–1.03)
EOSINOPHIL # BLD AUTO: 78 CELLS/UL (ref 15–500)
EOSINOPHIL NFR BLD AUTO: 2 %
ERYTHROCYTE [DISTWIDTH] IN BLOOD BY AUTOMATED COUNT: 12.5 % (ref 11–15)
FOLATE SERPL-MCNC: 12.7 NG/ML
GFR/BSA.PRED SERPLBLD CYS-BASED-ARV: 106 ML/MIN/1.73M2
GLOBULIN SER CALC-MCNC: 2.5 G/DL (CALC) (ref 1.9–3.7)
GLUCOSE SERPL-MCNC: 95 MG/DL (ref 65–99)
GLUCOSE UR QL STRIP: NEGATIVE
HBA1C MFR BLD: 5.6 %
HCT VFR BLD AUTO: 36.4 % (ref 35–45)
HDLC SERPL-MCNC: 92 MG/DL
HGB BLD-MCNC: 12 G/DL (ref 11.7–15.5)
HGB UR QL STRIP: ABNORMAL
HYALINE CASTS #/AREA URNS LPF: ABNORMAL /LPF
INR PPP: 1
IRON SATN MFR SERPL: 30 % (CALC) (ref 16–45)
IRON SERPL-MCNC: 98 MCG/DL (ref 45–160)
KETONES UR QL STRIP: NEGATIVE
LDLC SERPL CALC-MCNC: 89 MG/DL (CALC)
LEUKOCYTE ESTERASE UR QL STRIP: NEGATIVE
LYMPHOCYTES # BLD AUTO: 1451 CELLS/UL (ref 850–3900)
LYMPHOCYTES NFR BLD AUTO: 37.2 %
MCH RBC QN AUTO: 30.6 PG (ref 27–33)
MCHC RBC AUTO-ENTMCNC: 33 G/DL (ref 32–36)
MCV RBC AUTO: 92.9 FL (ref 80–100)
MONOCYTES # BLD AUTO: 257 CELLS/UL (ref 200–950)
MONOCYTES NFR BLD AUTO: 6.6 %
NEUTROPHILS # BLD AUTO: 2044 CELLS/UL (ref 1500–7800)
NEUTROPHILS NFR BLD AUTO: 52.4 %
NITRITE UR QL STRIP: NEGATIVE
NONHDLC SERPL-MCNC: 103 MG/DL (CALC)
PH UR STRIP: 5.5 [PH] (ref 5–8)
PLATELET # BLD AUTO: 210 THOUSAND/UL (ref 140–400)
PMV BLD REES-ECKER: 11.3 FL (ref 7.5–12.5)
POTASSIUM SERPL-SCNC: 4.6 MMOL/L (ref 3.5–5.3)
PROT SERPL-MCNC: 6.8 G/DL (ref 6.1–8.1)
PROT UR QL STRIP: NEGATIVE
PROTHROMBIN TIME: 10.5 SEC (ref 9–11.5)
PTH-INTACT SERPL-MCNC: 71 PG/ML (ref 16–77)
RBC # BLD AUTO: 3.92 MILLION/UL (ref 3.8–5.1)
RBC #/AREA URNS HPF: ABNORMAL /HPF
SODIUM SERPL-SCNC: 139 MMOL/L (ref 135–146)
SP GR UR STRIP: 1.02 (ref 1–1.03)
SQUAMOUS #/AREA URNS HPF: ABNORMAL /HPF
TIBC SERPL-MCNC: 328 MCG/DL (CALC) (ref 250–450)
TRIGL SERPL-MCNC: 46 MG/DL
TSH SERPL-ACNC: 2.52 MIU/L
VIT A SERPL-MCNC: 46 MCG/DL (ref 38–98)
VIT B1 BLD-SCNC: 98 NMOL/L (ref 78–185)
VIT B12 SERPL-MCNC: 361 PG/ML (ref 200–1100)
WBC # BLD AUTO: 3.9 THOUSAND/UL (ref 3.8–10.8)
WBC #/AREA URNS HPF: ABNORMAL /HPF
ZINC SERPL-MCNC: 70 MCG/DL (ref 60–130)

## 2025-05-05 ENCOUNTER — TELEPHONE (OUTPATIENT)
Dept: INTERNAL MEDICINE CLINIC | Facility: CLINIC | Age: 55
End: 2025-05-05

## 2025-05-05 NOTE — TELEPHONE ENCOUNTER
----- Message from Morgan Mendoza MD sent at 5/5/2025  1:36 PM EDT -----  Please tell her most of the blood work is okay except B12 needs to be above 400 and it is at 361.  So she would need to take 1000 mcg's extra once a week.  And have it repeated in a few months.

## 2025-05-05 NOTE — TELEPHONE ENCOUNTER
Patient called back, I read the message in its entirety, patient verbalizes understanding and has no further questions at this time

## 2025-06-02 ENCOUNTER — APPOINTMENT (EMERGENCY)
Dept: CT IMAGING | Facility: HOSPITAL | Age: 55
End: 2025-06-02
Payer: COMMERCIAL

## 2025-06-02 ENCOUNTER — HOSPITAL ENCOUNTER (EMERGENCY)
Facility: HOSPITAL | Age: 55
Discharge: HOME | End: 2025-06-02
Attending: EMERGENCY MEDICINE | Admitting: EMERGENCY MEDICINE
Payer: COMMERCIAL

## 2025-06-02 ENCOUNTER — NURSE TRIAGE (OUTPATIENT)
Age: 55
End: 2025-06-02

## 2025-06-02 VITALS
HEART RATE: 67 BPM | RESPIRATION RATE: 16 BRPM | SYSTOLIC BLOOD PRESSURE: 113 MMHG | OXYGEN SATURATION: 100 % | DIASTOLIC BLOOD PRESSURE: 66 MMHG | TEMPERATURE: 98.5 F

## 2025-06-02 DIAGNOSIS — K44.9 HIATAL HERNIA: ICD-10-CM

## 2025-06-02 DIAGNOSIS — R11.2 NAUSEA AND VOMITING: ICD-10-CM

## 2025-06-02 DIAGNOSIS — R10.9 ABDOMINAL PAIN: Primary | ICD-10-CM

## 2025-06-02 DIAGNOSIS — R31.9 HEMATURIA: ICD-10-CM

## 2025-06-02 LAB
ALBUMIN SERPL BCG-MCNC: 4.3 G/DL (ref 3.5–5)
ALP SERPL-CCNC: 84 U/L (ref 34–104)
ALT SERPL W P-5'-P-CCNC: 21 U/L (ref 7–52)
ANION GAP SERPL CALCULATED.3IONS-SCNC: 7 MMOL/L (ref 4–13)
AST SERPL W P-5'-P-CCNC: 22 U/L (ref 13–39)
BACTERIA UR QL AUTO: ABNORMAL /HPF
BASOPHILS # BLD AUTO: 0.02 THOUSANDS/ÂΜL (ref 0–0.1)
BASOPHILS NFR BLD AUTO: 1 % (ref 0–1)
BILIRUB SERPL-MCNC: 0.41 MG/DL (ref 0.2–1)
BILIRUB UR QL STRIP: NEGATIVE
BUN SERPL-MCNC: 10 MG/DL (ref 5–25)
CALCIUM SERPL-MCNC: 9.2 MG/DL (ref 8.4–10.2)
CARDIAC TROPONIN I PNL SERPL HS: <2 NG/L (ref ?–50)
CHLORIDE SERPL-SCNC: 102 MMOL/L (ref 96–108)
CLARITY UR: CLEAR
CO2 SERPL-SCNC: 27 MMOL/L (ref 21–32)
COLOR UR: YELLOW
CREAT SERPL-MCNC: 0.71 MG/DL (ref 0.6–1.3)
EOSINOPHIL # BLD AUTO: 0.06 THOUSAND/ÂΜL (ref 0–0.61)
EOSINOPHIL NFR BLD AUTO: 1 % (ref 0–6)
ERYTHROCYTE [DISTWIDTH] IN BLOOD BY AUTOMATED COUNT: 13.2 % (ref 11.6–15.1)
GFR SERPL CREATININE-BSD FRML MDRD: 98 ML/MIN/1.73SQ M
GLUCOSE SERPL-MCNC: 100 MG/DL (ref 65–140)
GLUCOSE UR STRIP-MCNC: NEGATIVE MG/DL
HCT VFR BLD AUTO: 37.9 % (ref 34.8–46.1)
HGB BLD-MCNC: 12.3 G/DL (ref 11.5–15.4)
HGB UR QL STRIP.AUTO: ABNORMAL
IMM GRANULOCYTES # BLD AUTO: 0.01 THOUSAND/UL (ref 0–0.2)
IMM GRANULOCYTES NFR BLD AUTO: 0 % (ref 0–2)
KETONES UR STRIP-MCNC: NEGATIVE MG/DL
LEUKOCYTE ESTERASE UR QL STRIP: NEGATIVE
LIPASE SERPL-CCNC: 10 U/L (ref 11–82)
LYMPHOCYTES # BLD AUTO: 1.52 THOUSANDS/ÂΜL (ref 0.6–4.47)
LYMPHOCYTES NFR BLD AUTO: 36 % (ref 14–44)
MCH RBC QN AUTO: 30.6 PG (ref 26.8–34.3)
MCHC RBC AUTO-ENTMCNC: 32.5 G/DL (ref 31.4–37.4)
MCV RBC AUTO: 94 FL (ref 82–98)
MONOCYTES # BLD AUTO: 0.25 THOUSAND/ÂΜL (ref 0.17–1.22)
MONOCYTES NFR BLD AUTO: 6 % (ref 4–12)
MUCOUS THREADS UR QL AUTO: ABNORMAL
NEUTROPHILS # BLD AUTO: 2.34 THOUSANDS/ÂΜL (ref 1.85–7.62)
NEUTS SEG NFR BLD AUTO: 56 % (ref 43–75)
NITRITE UR QL STRIP: NEGATIVE
NON-SQ EPI CELLS URNS QL MICRO: ABNORMAL /HPF
NRBC BLD AUTO-RTO: 0 /100 WBCS
PH UR STRIP.AUTO: 6 [PH]
PLATELET # BLD AUTO: 204 THOUSANDS/UL (ref 149–390)
PMV BLD AUTO: 11.2 FL (ref 8.9–12.7)
POTASSIUM SERPL-SCNC: 3.8 MMOL/L (ref 3.5–5.3)
PROT SERPL-MCNC: 6.9 G/DL (ref 6.4–8.4)
PROT UR STRIP-MCNC: NEGATIVE MG/DL
RBC # BLD AUTO: 4.02 MILLION/UL (ref 3.81–5.12)
RBC #/AREA URNS AUTO: ABNORMAL /HPF
SODIUM SERPL-SCNC: 136 MMOL/L (ref 135–147)
SP GR UR STRIP.AUTO: 1.02 (ref 1–1.03)
UROBILINOGEN UR QL STRIP.AUTO: 0.2 E.U./DL
WBC # BLD AUTO: 4.2 THOUSAND/UL (ref 4.31–10.16)
WBC #/AREA URNS AUTO: ABNORMAL /HPF

## 2025-06-02 PROCEDURE — 99284 EMERGENCY DEPT VISIT MOD MDM: CPT

## 2025-06-02 PROCEDURE — 96361 HYDRATE IV INFUSION ADD-ON: CPT

## 2025-06-02 PROCEDURE — 84484 ASSAY OF TROPONIN QUANT: CPT | Performed by: EMERGENCY MEDICINE

## 2025-06-02 PROCEDURE — 83690 ASSAY OF LIPASE: CPT | Performed by: EMERGENCY MEDICINE

## 2025-06-02 PROCEDURE — 99285 EMERGENCY DEPT VISIT HI MDM: CPT | Performed by: EMERGENCY MEDICINE

## 2025-06-02 PROCEDURE — 80053 COMPREHEN METABOLIC PANEL: CPT | Performed by: EMERGENCY MEDICINE

## 2025-06-02 PROCEDURE — 36415 COLL VENOUS BLD VENIPUNCTURE: CPT | Performed by: EMERGENCY MEDICINE

## 2025-06-02 PROCEDURE — 81001 URINALYSIS AUTO W/SCOPE: CPT | Performed by: EMERGENCY MEDICINE

## 2025-06-02 PROCEDURE — 81003 URINALYSIS AUTO W/O SCOPE: CPT | Performed by: EMERGENCY MEDICINE

## 2025-06-02 PROCEDURE — 85025 COMPLETE CBC W/AUTO DIFF WBC: CPT | Performed by: EMERGENCY MEDICINE

## 2025-06-02 PROCEDURE — 74177 CT ABD & PELVIS W/CONTRAST: CPT

## 2025-06-02 PROCEDURE — 96375 TX/PRO/DX INJ NEW DRUG ADDON: CPT

## 2025-06-02 PROCEDURE — 93005 ELECTROCARDIOGRAM TRACING: CPT

## 2025-06-02 PROCEDURE — 96374 THER/PROPH/DIAG INJ IV PUSH: CPT

## 2025-06-02 RX ORDER — ONDANSETRON 2 MG/ML
4 INJECTION INTRAMUSCULAR; INTRAVENOUS ONCE
Status: COMPLETED | OUTPATIENT
Start: 2025-06-02 | End: 2025-06-02

## 2025-06-02 RX ORDER — ONDANSETRON 4 MG/1
4 TABLET, ORALLY DISINTEGRATING ORAL EVERY 6 HOURS PRN
Qty: 20 TABLET | Refills: 0 | Status: SHIPPED | OUTPATIENT
Start: 2025-06-02

## 2025-06-02 RX ORDER — KETOROLAC TROMETHAMINE 30 MG/ML
15 INJECTION, SOLUTION INTRAMUSCULAR; INTRAVENOUS ONCE
Status: COMPLETED | OUTPATIENT
Start: 2025-06-02 | End: 2025-06-02

## 2025-06-02 RX ORDER — SUCRALFATE 1 G/1
1 TABLET ORAL 4 TIMES DAILY
Qty: 56 TABLET | Refills: 0 | Status: SHIPPED | OUTPATIENT
Start: 2025-06-02 | End: 2025-06-16

## 2025-06-02 RX ADMIN — ONDANSETRON 4 MG: 2 INJECTION INTRAMUSCULAR; INTRAVENOUS at 13:52

## 2025-06-02 RX ADMIN — IOHEXOL 100 ML: 350 INJECTION, SOLUTION INTRAVENOUS at 15:25

## 2025-06-02 RX ADMIN — KETOROLAC TROMETHAMINE 15 MG: 30 INJECTION, SOLUTION INTRAMUSCULAR at 13:54

## 2025-06-02 RX ADMIN — SODIUM CHLORIDE 1000 ML: 0.9 INJECTION, SOLUTION INTRAVENOUS at 13:50

## 2025-06-02 NOTE — TELEPHONE ENCOUNTER
"REASON FOR CONVERSATION: Abdominal Pain    SYMPTOMS: Patient called with abdominal pain that started intermittent Thursday but has been constant now since yesterday. Pain is in upper middle abdomen and patient rates pain   6/10. Patient also endorses burping, beige colored stool, nausea, diarrhea, vomiting, and trouble sleeping due to pain. Advised ED visit, patient verbalized understanding.     OTHER HEALTH INFORMATION: N/A    PROTOCOL DISPOSITION: Go to ED Now    CARE ADVICE PROVIDED: Go to ED now    PRACTICE FOLLOW-UP: N/A    Reason for Disposition   Pain lasting > 10 minutes and over 50 years old    Answer Assessment - Initial Assessment Questions  1. LOCATION: \"Where does it hurt?\"       Upper middle    2. RADIATION: \"Does the pain shoot anywhere else?\" (e.g., chest, back)      Denies  3. ONSET: \"When did the pain begin?\" (e.g., minutes, hours or days ago)       Thursday  4. SUDDEN: \"Gradual or sudden onset?\"      Gradual  5. PATTERN \"Does the pain come and go, or is it constant?\"      Intermittent, pain episodes ~15min  6. SEVERITY: \"How bad is the pain?\"  (e.g., Scale 1-10; mild, moderate, or severe)      6/10  7. RECURRENT SYMPTOM: \"Have you ever had this type of stomach pain before?\" If Yes, ask: \"When was the last time?\" and \"What happened that time?\"       Denies  8. AGGRAVATING FACTORS: \"Does anything seem to cause this pain?\" (e.g., foods, stress, alcohol)      Unsure  9. CARDIAC SYMPTOMS: \"Do you have any of the following symptoms: chest pain, difficulty breathing, sweating, nausea?\"      Nausea  10. OTHER SYMPTOMS: \"Do you have any other symptoms?\" (e.g., back pain, diarrhea, fever, urination pain, vomiting)        Burping, feels like has to go to bathroom but can't go, not passing gas, stool is beige colored, diarrhea, vomiting, unable to sleep  11. PREGNANCY: \"Is there any chance you are pregnant?\" \"When was your last menstrual period?\"        N/A    Protocols used: Abdominal Pain - " Upper-Adult-[place]

## 2025-06-02 NOTE — DISCHARGE INSTRUCTIONS
CT scan showed a small hiatal hernia.    Follow-up with gastroenterology regarding your pain for possible endoscopy in the future to evaluate for peptic ulcers, gastritis.    Take the Zofran every 6 hours as needed for nausea and vomiting.  Take the sucralfate as prescribed to help decrease stomach pain.    Urine shows blood within your urine.  Follow-up with your primary care provider for recheck to ensure the blood goes away.  If bleeding does not go away you should see a urologist to rule out a small mass.

## 2025-06-02 NOTE — ED CARE HANDOFF
Emergency Department Sign Out Note        Sign out and transfer of care from my colleague. See Separate Emergency Department note.     The patient, Laure Gaxiola, was evaluated by the previous provider for intermittent epigastic pain, belching, intermittent nausea and vomiting.    .    Workup Completed:  Labs Reviewed   CBC AND DIFFERENTIAL - Abnormal       Result Value Ref Range Status    WBC 4.20 (*) 4.31 - 10.16 Thousand/uL Final    RBC 4.02  3.81 - 5.12 Million/uL Final    Hemoglobin 12.3  11.5 - 15.4 g/dL Final    Hematocrit 37.9  34.8 - 46.1 % Final    MCV 94  82 - 98 fL Final    MCH 30.6  26.8 - 34.3 pg Final    MCHC 32.5  31.4 - 37.4 g/dL Final    RDW 13.2  11.6 - 15.1 % Final    MPV 11.2  8.9 - 12.7 fL Final    Platelets 204  149 - 390 Thousands/uL Final    nRBC 0  /100 WBCs Final    Segmented % 56  43 - 75 % Final    Immature Grans % 0  0 - 2 % Final    Lymphocytes % 36  14 - 44 % Final    Monocytes % 6  4 - 12 % Final    Eosinophils Relative 1  0 - 6 % Final    Basophils Relative 1  0 - 1 % Final    Absolute Neutrophils 2.34  1.85 - 7.62 Thousands/µL Final    Absolute Immature Grans 0.01  0.00 - 0.20 Thousand/uL Final    Absolute Lymphocytes 1.52  0.60 - 4.47 Thousands/µL Final    Absolute Monocytes 0.25  0.17 - 1.22 Thousand/µL Final    Eosinophils Absolute 0.06  0.00 - 0.61 Thousand/µL Final    Basophils Absolute 0.02  0.00 - 0.10 Thousands/µL Final   LIPASE - Abnormal    Lipase 10 (*) 11 - 82 u/L Final   URINALYSIS WITH REFLEX TO SCOPE - Abnormal    Color, UA Yellow  Yellow Final    Clarity, UA Clear  Clear Final    Specific Gravity, UA 1.025  1.001 - 1.030 Final    pH, UA 6.0  5.0, 5.5, 6.0, 6.5, 7.0, 7.5, 8.0 Final    Leukocytes, UA Negative  Negative Final    Nitrite, UA Negative  Negative Final    Protein, UA Negative  Negative, Interference- unable to analyze mg/dl Final    Glucose, UA Negative  Negative mg/dl Final    Ketones, UA Negative  Negative mg/dl Final    Urobilinogen, UA 0.2  0.2, 1.0  "E.U./dl E.U./dl Final    Bilirubin, UA Negative  Negative Final    Occult Blood, UA 3+ (*) Negative Final   URINE MICROSCOPIC - Abnormal    RBC, UA 4-10 (*) None Seen, 0-1, 1-2, 2-4, 0-5 /hpf Final    WBC, UA 0-1  None Seen, 0-1, 1-2, 0-5, 2-4 /hpf Final    Epithelial Cells Occasional  None Seen, Occasional /hpf Final    Bacteria, UA Occasional  None Seen, Occasional /hpf Final    MUCUS THREADS Innumerable (*) None Seen Final   HS TROPONIN I 0HR - Normal    hs TnI 0hr <2  \"Refer to ACS Flowchart\"- see link ng/L Final    Comment:                                              Initial (time 0) result  If >=50 ng/L, Myocardial injury suggested ;  Type of myocardial injury and treatment strategy  to be determined.  If 5-49 ng/L, a delta result at 2 hours will be needed to further evaluate.  If <4 ng/L, and chest pain has been >3 hours since onset, patient may qualify for discharge based on the HEART score in the ED.  If <5 ng/L and <3hours since onset of chest pain, a delta result at 2 hours will be needed to further evaluate.    HS Troponin 99th Percentile URL of a Health Population=12 ng/L with a 95% Confidence Interval of 8-18 ng/L.    Second Troponin (time 2 hours)  If calculated delta >= 20 ng/L,  Myocardial injury suggested ; Type of myocardial injury and treatment strategy to be determined.  If 5-49 ng/L and the calculated delta is 5-19 ng/L, consult medical service for evaluation.  Continue evaluation for ischemia on ecg and other possible etiology and repeat hs troponin at 4 hours.  If delta is <5 ng/L at 2 hours, consider discharge based on risk stratification via the HEART score (if in ED), or DANILO risk score in IP/Observation.    HS Troponin 99th Percentile URL of a Health Population=12 ng/L with a 95% Confidence Interval of 8-18 ng/L.   COMPREHENSIVE METABOLIC PANEL    Sodium 136  135 - 147 mmol/L Final    Potassium 3.8  3.5 - 5.3 mmol/L Final    Chloride 102  96 - 108 mmol/L Final    CO2 27  21 - 32 mmol/L " Final    ANION GAP 7  4 - 13 mmol/L Final    BUN 10  5 - 25 mg/dL Final    Creatinine 0.71  0.60 - 1.30 mg/dL Final    Comment: Standardized to IDMS reference method    Glucose 100  65 - 140 mg/dL Final    Comment: If the patient is fasting, the ADA then defines impaired fasting glucose as > 100 mg/dL and diabetes as > or equal to 123 mg/dL.    Calcium 9.2  8.4 - 10.2 mg/dL Final    AST 22  13 - 39 U/L Final    ALT 21  7 - 52 U/L Final    Comment: Specimen collection should occur prior to Sulfasalazine administration due to the potential for falsely depressed results.     Alkaline Phosphatase 84  34 - 104 U/L Final    Total Protein 6.9  6.4 - 8.4 g/dL Final    Albumin 4.3  3.5 - 5.0 g/dL Final    Total Bilirubin 0.41  0.20 - 1.00 mg/dL Final    Comment: Use of this assay is not recommended for patients undergoing treatment with eltrombopag due to the potential for falsely elevated results.  N-acetyl-p-benzoquinone imine (metabolite of Acetaminophen) will generate erroneously low results in samples for patients that have taken an overdose of Acetaminophen.    eGFR 98  ml/min/1.73sq m Final    Narrative:     National Kidney Disease Foundation guidelines for Chronic Kidney Disease (CKD):     Stage 1 with normal or high GFR (GFR > 90 mL/min/1.73 square meters)    Stage 2 Mild CKD (GFR = 60-89 mL/min/1.73 square meters)    Stage 3A Moderate CKD (GFR = 45-59 mL/min/1.73 square meters)    Stage 3B Moderate CKD (GFR = 30-44 mL/min/1.73 square meters)    Stage 4 Severe CKD (GFR = 15-29 mL/min/1.73 square meters)    Stage 5 End Stage CKD (GFR <15 mL/min/1.73 square meters)  Note: GFR calculation is accurate only with a steady state creatinine     No results found.      ED Course / Workup Pending (followup):      Pending Ct scan. Has hx of bypass.         No data recorded          CT abdomen pelvis with contrast  Result Date: 6/2/2025  Impression: No acute inflammatory process identified. Small hiatal hernia. Workstation  performed: [id number]     Imaging shows a small hiatal hernia.  Blood work no significant findings.  Urine with hematuria.  I talked to patient about this.  She states that she has had hematuria since she was a child.  I recommended that she follow-up with a primary care provider and possible urologist to ensure resolution of hematuria and if not might need cystoscopy.    Will recommend follow-up with gastroenterology to be evaluated for gastritis, peptic ulcer disease.    Discharged on sucralfate and Zofran.                   Procedures  Medical Decision Making  Amount and/or Complexity of Data Reviewed  Labs: ordered.  Radiology: ordered.    Risk  Prescription drug management.            Disposition  Final diagnoses:   Abdominal pain   Nausea and vomiting   Hiatal hernia   Hematuria     Time reflects when diagnosis was documented in both MDM as applicable and the Disposition within this note       Time User Action Codes Description Comment    6/2/2025  4:54 PM Mendoza Britton Add [R10.9] Abdominal pain     6/2/2025  4:54 PM Mendoza Britton Add [R11.2] Nausea and vomiting     6/2/2025  4:54 PM Mendoza Britton Add [K44.9] Hiatal hernia     6/2/2025  4:55 PM Kiran Brittonus Add [R31.9] Hematuria           ED Disposition       ED Disposition   Discharge    Condition   Stable    Date/Time   Mon Jun 2, 2025  4:54 PM    Comment   Laure Gaxiola discharge to home/self care.                   Follow-up Information       Follow up With Specialties Details Why Contact Info Additional Information    [facility] Emergency Department Emergency Medicine  If symptoms worsen [address] [address]  [phone] [facility] Emergency Department, [address] [address]    [facility] Gastroenterology [address] Gastroenterology Schedule an appointment as soon as possible for a visit  For re-evaluation as soon as possible [address] [place] [address] [address]  [place] [address] [phone] [facility] Gastroenterology [place] [address], [address], [place], [address] [phone]          Discharge Medication List as of 6/2/2025  4:56 PM        START taking these medications    Details   ondansetron (ZOFRAN-ODT) 4 mg disintegrating tablet Take 1 tablet (4 mg total) by mouth every 6 (six) hours as needed for nausea or vomiting, Starting Mon 6/2/2025, Normal      sucralfate (CARAFATE) 1 g tablet Take 1 tablet (1 g total) by mouth 4 (four) times a day for 14 days, Starting Mon 6/2/2025, Until Mon 6/16/2025, Normal           CONTINUE these medications which have NOT CHANGED    Details   Ascorbic Acid (vitamin C) 1000 MG tablet Historical Med      BIOTIN PO Take by mouth, Historical Med      Butalbital-APAP-Caffeine (Fioricet) -40 MG CAPS Take 1 tablet by mouth daily as needed (migraine), Starting Fri 4/22/2022, Normal      CALCIUM CITRATE PO Take by mouth, Historical Med      COLLAGEN PO Historical Med      cyanocobalamin ([employer] Vitamin B-12) 2000 MCG tablet Historical Med      cyclobenzaprine (FLEXERIL) 10 mg tablet Take 1 tablet (10 mg total) by mouth 3 (three) times a day as needed for muscle spasms, Starting Wed 1/31/2024, Normal      ergocalciferol (VITAMIN D2) 50,000 units Take 1 capsule (50,000 Units total) by mouth once a week, Starting Wed 1/31/2024, Until Thu 4/17/2025, Normal      ferrous sulfate 324 (65 Fe) mg TAKE 1 TABLET (324 MG TOTAL) BY MOUTH 2 (TWO) TIMES A DAY BEFORE MEALS, Starting Tue 11/30/2021, Normal      levocetirizine (XYZAL) 5 MG tablet Take 5 mg by mouth daily, Starting Fri 5/3/2024, Historical Med      LORazepam (ATIVAN) 0.5 mg tablet Take 0.5 mg by mouth daily as needed for anxiety, Starting Thu 1/30/2025, Historical Med      Multiple Vitamin (Daily-Isra Multivitamin) TABS Take 1 tablet by mouth daily, Starting Wed 1/31/2024, Normal      promethazine-dextromethorphan (PHENERGAN-DM) 6.25-15  mg/5 mL oral syrup Take 5 mL by mouth 4 (four) times a day as needed for cough, Starting Thu 9/7/2023, Normal      Qelbree 200 MG CP24 Take 2 capsules by mouth in the morning, Starting Fri 3/28/2025, Historical Med      Semaglutide-Weight Management (WEGOVY) 0.25 MG/0.5ML Inject 0.5 mL (0.25 mg total) under the skin once a week, Starting Fri 2/23/2024, Normal      triamcinolone (KENALOG) 0.1 % cream APPLY TO AFFECTED AREA TWICE A DAY, Starting Mon 3/17/2025, Normal      VITAMIN E PO Take by mouth, Historical Med      Vyvanse 60 MG capsule Take 60 mg by mouth daily, Starting Tue 7/27/2021, Historical Med                  ED Provider  Electronically Signed by     Mendoza Britton,   06/02/25 [id number]

## 2025-06-02 NOTE — TELEPHONE ENCOUNTER
"REASON FOR CONVERSATION: Abdominal Pain    SYMPTOMS: x2.5 weeks of intermittent severe \"burning\" upper abd pain episodes lasting 10 min then self resolving, nausea/vomiting, frequent burping, and pale/best soft stools.     PT requesting appointment with Dr. Finney, seen previously for same.     OTHER HEALTH INFORMATION: Per last office visit note: \" I told her to come to the emergency department if she starts having recurrence of pain, nausea, vomiting, dry heaving. \"    PROTOCOL DISPOSITION: Go to ED/UCC Now (Or to Office with PCP Approval)    CARE ADVICE PROVIDED: Advised PT to go to ED. PT verbalized understanding and agreeable to plan.     Appointment schedule and PT placed on waitlist.    PRACTICE FOLLOW-UP: fyi      Reason for Disposition   Patient sounds very sick or weak to the triager    Answer Assessment - Initial Assessment Questions  1. LOCATION: \"Where does it hurt?\"       upper  2. RADIATION: \"Does the pain shoot anywhere else?\" (e.g., chest, back)      no  3. ONSET: \"When did the pain begin?\" (e.g., minutes, hours or days ago)       2.5 weeks  4. SUDDEN: \"Gradual or sudden onset?\"      sudden  5. PATTERN \"Does the pain come and go, or is it constant?\"      intermitten  6. SEVERITY: \"How bad is the pain?\"  (e.g., Scale 1-10; mild, moderate, or severe)      severe  7. RECURRENT SYMPTOM: \"Have you ever had this type of stomach pain before?\" If Yes, ask: \"When was the last time?\" and \"What happened that time?\"       Yes, see last office visit note  8. AGGRAVATING FACTORS: \"Does anything seem to cause this pain?\" (e.g., foods, stress, alcohol)      food  9. CARDIAC SYMPTOMS: \"Do you have any of the following symptoms: chest pain, difficulty breathing, sweating, nausea?\"      no  10. OTHER SYMPTOMS: \"Do you have any other symptoms?\" (e.g., back pain, diarrhea, fever, urination pain, vomiting)        Vomiting, burping, change in bowel habits  11. PREGNANCY: \"Is there any chance you are pregnant?\" \"When was " "your last menstrual period?\"        no    Protocols used: Abdominal Pain - Upper-Adult-[place]    "

## 2025-06-02 NOTE — ED PROVIDER NOTES
Time reflects when diagnosis was documented in both MDM as applicable and the Disposition within this note       Time User Action Codes Description Comment    6/2/2025  4:54 PM Mendoza Britton [R10.9] Abdominal pain     6/2/2025  4:54 PM Mendoza Britton Add [R11.2] Nausea and vomiting     6/2/2025  4:54 PM Mendoza Britton Add [K44.9] Hiatal hernia     6/2/2025  4:55 PM Mendoza Britton [R31.9] Hematuria           ED Disposition       ED Disposition   Discharge    Condition   Stable    Date/Time   Mon Jun 2, 2025  4:54 PM    Comment   Laure Gaxiola discharge to home/self care.                   Assessment & Plan       Medical Decision Making  54-year-old female presented to the emergency department for evaluation of abdominal pain.  Differential includes but not limited to infection, abscess, perforation, obstruction, electrolyte abnormality, dehydration, thrombocytopenia, gastroenteritis.  EKG ordered to evaluate for acute ischemia.  EKG and my independent interpretation with a sinus rhythm with no acute ischemic changes.  These and other diagnoses were considered.  Patient treated symptomatically with a fluid bolus, Zofran and Toradol with improvement of symptoms.  Blood work showed the patient had a WBC of 4.2 but otherwise blood work grossly unremarkable and consistent with the patient's baseline.  Urinalysis was not consistent with a urinary tract infection.  Patient updated on results of all diagnostic testing.  All questions answered.  Patient signed out at change of shift to Dr. Britton pending results of the CT scan and final disposition.    Amount and/or Complexity of Data Reviewed  Labs: ordered.  Radiology: ordered.  ECG/medicine tests: ordered and independent interpretation performed.    Risk  Prescription drug management.             Medications   sodium chloride 0.9 % bolus 1,000 mL (0 mL Intravenous Stopped 6/2/25 1510)   ketorolac (TORADOL) injection 15 mg (15 mg Intravenous Given 6/2/25  1354)   ondansetron (ZOFRAN) injection 4 mg (4 mg Intravenous Given 6/2/25 1352)   iohexol (OMNIPAQUE) 240 MG/ML solution 50 mL (50 mL Oral Given 6/2/25 1525)   iohexol (OMNIPAQUE) 350 MG/ML injection (SINGLE-DOSE) 100 mL (100 mL Intravenous Given 6/2/25 1525)       ED Risk Strat Scores                    No data recorded                            History of Present Illness       Chief Complaint   Patient presents with    Abdominal Pain     Patient reports for the last 3 weeks she has been burping, and abdominal pain. She reports constipation and first but starting today she had diarrhea and vomiting over the weekend. Patient also expresses concern for random bruising all over        Past Medical History[1]   Past Surgical History[2]   Family History[3]   Social History[4]   E-Cigarette/Vaping    E-Cigarette Use Never User       E-Cigarette/Vaping Substances    Nicotine No     THC No     CBD No     Flavoring No     Other No     Unknown No       I have reviewed and agree with the history as documented.     54-year-old female with history of gastric bypass presents to the emergency department for evaluation of abdominal pain.  The patient reports having intermittent episodes of epigastric abdominal pain over the past several months which she states has been worsening over the past few weeks.  States now she is having episodes of frequent belching with several days of constipation then followed by an episode of nonbloody diarrhea today.  Patient also reports for the past several months feeling like she has been bruising easier than she used to.  No fever, chills, nausea, vomiting, sick contacts, recent travel, chest pain, shortness of breath or urinary symptoms.        Review of Systems   Constitutional:  Negative for chills and fever.   HENT:  Negative for ear pain and sore throat.    Eyes:  Negative for pain and visual disturbance.   Respiratory:  Negative for cough and shortness of breath.    Cardiovascular:   Negative for chest pain and palpitations.   Gastrointestinal:  Positive for abdominal pain, constipation, diarrhea, nausea and vomiting.   Genitourinary:  Negative for dysuria and hematuria.   Musculoskeletal:  Negative for arthralgias and back pain.   Skin:  Negative for color change and rash.   Neurological:  Negative for seizures and syncope.   All other systems reviewed and are negative.          Objective       ED Triage Vitals   Temperature Pulse Blood Pressure Respirations SpO2 Patient Position - Orthostatic VS   06/02/25 1259 06/02/25 1259 06/02/25 1259 06/02/25 1259 06/02/25 1259 06/02/25 1515   98.5 °F (36.9 °C) 90 121/64 18 100 % Sitting      Temp Source Heart Rate Source BP Location FiO2 (%) Pain Score    06/02/25 1259 06/02/25 1259 06/02/25 1259 -- 06/02/25 1354    Oral Monitor Left arm  6      Vitals      Date and Time Temp Pulse SpO2 Resp BP Pain Score FACES Pain Rating User   06/02/25 1634 -- 67 100 % 16 113/66 3 -- KLB   06/02/25 1515 -- 67 99 % 18 111/76 4 -- KLB   06/02/25 1354 -- -- -- -- -- 6 -- KLB   06/02/25 1259 98.5 °F (36.9 °C) 90 100 % 18 121/64 -- --             Physical Exam  Vitals and nursing note reviewed.   Constitutional:       General: She is not in acute distress.     Appearance: She is well-developed.   HENT:      Head: Normocephalic and atraumatic.     Eyes:      Conjunctiva/sclera: Conjunctivae normal.       Cardiovascular:      Rate and Rhythm: Normal rate and regular rhythm.      Heart sounds: No murmur heard.  Pulmonary:      Effort: Pulmonary effort is normal. No respiratory distress.      Breath sounds: Normal breath sounds.   Abdominal:      Palpations: Abdomen is soft.      Tenderness: There is abdominal tenderness in the epigastric area. There is no guarding or rebound.     Musculoskeletal:         General: No swelling.      Cervical back: Neck supple.     Skin:     General: Skin is warm and dry.      Capillary Refill: Capillary refill takes less than 2 seconds.      Neurological:      Mental Status: She is alert.     Psychiatric:         Mood and Affect: Mood normal.         Results Reviewed       Procedure Component Value Units Date/Time    Urine Microscopic [[id number]]  (Abnormal) Collected: 06/02/25 1356    Lab Status: Final result Specimen: Urine, Clean Catch Updated: 06/02/25 1440     RBC, UA 4-10 /hpf      WBC, UA 0-1 /hpf      Epithelial Cells Occasional /hpf      Bacteria, UA Occasional /hpf      MUCUS THREADS Innumerable    UA (URINE) with reflex to Scope [[id number]]  (Abnormal) Collected: 06/02/25 1356    Lab Status: Final result Specimen: Urine, Clean Catch Updated: 06/02/25 1427     Color, UA Yellow     Clarity, UA Clear     Specific Gravity, UA 1.025     pH, UA 6.0     Leukocytes, UA Negative     Nitrite, UA Negative     Protein, UA Negative mg/dl      Glucose, UA Negative mg/dl      Ketones, UA Negative mg/dl      Urobilinogen, UA 0.2 E.U./dl      Bilirubin, UA Negative     Occult Blood, UA 3+    HS Troponin 0hr (reflex protocol) [[id number]]  (Normal) Collected: 06/02/25 1341    Lab Status: Final result Specimen: Blood from Arm, Right Updated: 06/02/25 1415     hs TnI 0hr <2 ng/L     Comprehensive metabolic panel [[id number]] Collected: 06/02/25 1341    Lab Status: Final result Specimen: Blood from Arm, Right Updated: 06/02/25 1410     Sodium 136 mmol/L      Potassium 3.8 mmol/L      Chloride 102 mmol/L      CO2 27 mmol/L      ANION GAP 7 mmol/L      BUN 10 mg/dL      Creatinine 0.71 mg/dL      Glucose 100 mg/dL      Calcium 9.2 mg/dL      AST 22 U/L      ALT 21 U/L      Alkaline Phosphatase 84 U/L      Total Protein 6.9 g/dL      Albumin 4.3 g/dL      Total Bilirubin 0.41 mg/dL      eGFR 98 ml/min/1.73sq m     Narrative:      [employer] guidelines for Chronic Kidney Disease (CKD):     Stage 1 with normal or high GFR (GFR > 90 mL/min/1.73 square meters)    Stage 2 Mild CKD (GFR = 60-89 mL/min/1.73 square meters)    Stage 3A Moderate CKD  (GFR = 45-59 mL/min/1.73 square meters)    Stage 3B Moderate CKD (GFR = 30-44 mL/min/1.73 square meters)    Stage 4 Severe CKD (GFR = 15-29 mL/min/1.73 square meters)    Stage 5 End Stage CKD (GFR <15 mL/min/1.73 square meters)  Note: GFR calculation is accurate only with a steady state creatinine    Lipase [[id number]]  (Abnormal) Collected: 06/02/25 1341    Lab Status: Final result Specimen: Blood from Arm, Right Updated: 06/02/25 1410     Lipase 10 u/L     CBC and differential [[id number]]  (Abnormal) Collected: 06/02/25 1341    Lab Status: Final result Specimen: Blood from Arm, Right Updated: 06/02/25 1352     WBC 4.20 Thousand/uL      RBC 4.02 Million/uL      Hemoglobin 12.3 g/dL      Hematocrit 37.9 %      MCV 94 fL      MCH 30.6 pg      MCHC 32.5 g/dL      RDW 13.2 %      MPV 11.2 fL      Platelets 204 Thousands/uL      nRBC 0 /100 WBCs      Segmented % 56 %      Immature Grans % 0 %      Lymphocytes % 36 %      Monocytes % 6 %      Eosinophils Relative 1 %      Basophils Relative 1 %      Absolute Neutrophils 2.34 Thousands/µL      Absolute Immature Grans 0.01 Thousand/uL      Absolute Lymphocytes 1.52 Thousands/µL      Absolute Monocytes 0.25 Thousand/µL      Eosinophils Absolute 0.06 Thousand/µL      Basophils Absolute 0.02 Thousands/µL             CT abdomen pelvis with contrast   Final Interpretation by Donavon Mack MD (06/02 1651)      No acute inflammatory process identified.      Small hiatal hernia.         Workstation performed: [id number]             ECG 12 Lead Documentation Only    Date/Time: 6/2/2025 1:44 PM    Performed by: Curly Castañeda MD  Authorized by: Curly Castañeda MD    ECG reviewed by me, the ED Provider: yes    Patient location:  ED  Previous ECG:     Previous ECG:  Compared to current    Similarity:  No change    Comparison to cardiac monitor: Yes    Interpretation:     Interpretation: normal    Rate:     ECG rate assessment: normal    Rhythm:     Rhythm: sinus rhythm     Ectopy:     Ectopy: none    QRS:     QRS axis:  Normal  Conduction:     Conduction: normal    ST segments:     ST segments:  Normal  T waves:     T waves: normal        ED Medication and Procedure Management   Prior to Admission Medications   Prescriptions Last Dose Informant Patient Reported? Taking?   Ascorbic Acid (vitamin C) 1000 MG tablet  Self Yes No   BIOTIN PO  Self Yes No   Sig: Take by mouth   Patient not taking: Reported on 4/17/2025   Butalbital-APAP-Caffeine (Fioricet) -40 MG CAPS  Self No No   Sig: Take 1 tablet by mouth daily as needed (migraine)   Patient not taking: Reported on 1/13/2025   CALCIUM CITRATE PO  Self Yes No   Sig: Take by mouth   COLLAGEN PO  Self Yes No   Patient not taking: Reported on 4/17/2025   LORazepam (ATIVAN) 0.5 mg tablet  Self Yes No   Sig: Take 0.5 mg by mouth daily as needed for anxiety   Multiple Vitamin (Daily-Isra Multivitamin) TABS  Self No No   Sig: Take 1 tablet by mouth daily   Qelbree 200 MG CP24  Self Yes No   Sig: Take 2 capsules by mouth in the morning   Semaglutide-Weight Management (WEGOVY) 0.25 MG/0.5ML  Self No No   Sig: Inject 0.5 mL (0.25 mg total) under the skin once a week   Patient not taking: Reported on 3/19/2024   VITAMIN E PO  Self Yes No   Sig: Take by mouth   Patient not taking: Reported on 1/13/2025   Vyvanse 60 MG capsule  Self Yes No   Sig: Take 60 mg by mouth daily   cyanocobalamin ([facility] Vitamin B-12) 2000 MCG tablet  Self Yes No   cyclobenzaprine (FLEXERIL) 10 mg tablet  Self No No   Sig: Take 1 tablet (10 mg total) by mouth 3 (three) times a day as needed for muscle spasms   Patient not taking: Reported on 1/13/2025   ergocalciferol (VITAMIN D2) 50,000 units  Self No No   Sig: Take 1 capsule (50,000 Units total) by mouth once a week   ferrous sulfate 324 (65 Fe) mg  Self No No   Sig: TAKE 1 TABLET (324 MG TOTAL) BY MOUTH 2 (TWO) TIMES A DAY BEFORE MEALS   Patient not taking: Reported on 4/17/2025   levocetirizine (XYZAL) 5 MG tablet   Self Yes No   Sig: Take 5 mg by mouth daily   Patient not taking: Reported on 4/17/2025   promethazine-dextromethorphan (PHENERGAN-DM) 6.25-15 mg/5 mL oral syrup  Self No No   Sig: Take 5 mL by mouth 4 (four) times a day as needed for cough   Patient not taking: Reported on 1/31/2024   triamcinolone (KENALOG) 0.1 % cream  Self No No   Sig: APPLY TO AFFECTED AREA TWICE A DAY   Patient not taking: Reported on 4/17/2025      Facility-Administered Medications: None     Discharge Medication List as of 6/2/2025  4:56 PM        START taking these medications    Details   ondansetron (ZOFRAN-ODT) 4 mg disintegrating tablet Take 1 tablet (4 mg total) by mouth every 6 (six) hours as needed for nausea or vomiting, Starting Mon 6/2/2025, Normal      sucralfate (CARAFATE) 1 g tablet Take 1 tablet (1 g total) by mouth 4 (four) times a day for 14 days, Starting Mon 6/2/2025, Until Mon 6/16/2025, Normal           CONTINUE these medications which have NOT CHANGED    Details   Ascorbic Acid (vitamin C) 1000 MG tablet Historical Med      BIOTIN PO Take by mouth, Historical Med      Butalbital-APAP-Caffeine (Fioricet) -40 MG CAPS Take 1 tablet by mouth daily as needed (migraine), Starting Fri 4/22/2022, Normal      CALCIUM CITRATE PO Take by mouth, Historical Med      COLLAGEN PO Historical Med      cyanocobalamin ([employer] Vitamin B-12) 2000 MCG tablet Historical Med      cyclobenzaprine (FLEXERIL) 10 mg tablet Take 1 tablet (10 mg total) by mouth 3 (three) times a day as needed for muscle spasms, Starting Wed 1/31/2024, Normal      ergocalciferol (VITAMIN D2) 50,000 units Take 1 capsule (50,000 Units total) by mouth once a week, Starting Wed 1/31/2024, Until Thu 4/17/2025, Normal      ferrous sulfate 324 (65 Fe) mg TAKE 1 TABLET (324 MG TOTAL) BY MOUTH 2 (TWO) TIMES A DAY BEFORE MEALS, Starting Tue 11/30/2021, Normal      levocetirizine (XYZAL) 5 MG tablet Take 5 mg by mouth daily, Starting Fri 5/3/2024, Historical Med       LORazepam (ATIVAN) 0.5 mg tablet Take 0.5 mg by mouth daily as needed for anxiety, Starting Thu 1/30/2025, Historical Med      Multiple Vitamin (Daily-Isra Multivitamin) TABS Take 1 tablet by mouth daily, Starting Wed 1/31/2024, Normal      promethazine-dextromethorphan (PHENERGAN-DM) 6.25-15 mg/5 mL oral syrup Take 5 mL by mouth 4 (four) times a day as needed for cough, Starting Thu 9/7/2023, Normal      Qelbree 200 MG CP24 Take 2 capsules by mouth in the morning, Starting Fri 3/28/2025, Historical Med      Semaglutide-Weight Management (WEGOVY) 0.25 MG/0.5ML Inject 0.5 mL (0.25 mg total) under the skin once a week, Starting Fri 2/23/2024, Normal      triamcinolone (KENALOG) 0.1 % cream APPLY TO AFFECTED AREA TWICE A DAY, Starting Mon 3/17/2025, Normal      VITAMIN E PO Take by mouth, Historical Med      Vyvanse 60 MG capsule Take 60 mg by mouth daily, Starting Tue 7/27/2021, Historical Med             ED SEPSIS DOCUMENTATION   Time reflects when diagnosis was documented in both MDM as applicable and the Disposition within this note       Time User Action Codes Description Comment    6/2/2025  4:54 PM Mendoza Britton Add [R10.9] Abdominal pain     6/2/2025  4:54 PM Mendoza Britton Add [R11.2] Nausea and vomiting     6/2/2025  4:54 PM Mendoza Britton [K44.9] Hiatal hernia     6/2/2025  4:55 PM Mendoza Britton [R31.9] Hematuria                      [1]   Past Medical History:  Diagnosis Date    ADHD (attention deficit hyperactivity disorder)     Asthma     Colon polyp     Gastroduodenitis     Head injury     Headache(784.0) 10 years ago    Intestinal malabsorption     Migraine     Peptic ulceration 7    PTSD (post-traumatic stress disorder)     Pyonephrosis     Sleep difficulties     UTI (urinary tract infection)     Vitamin D deficiency    [2]   Past Surgical History:  Procedure Laterality Date    ABDOMINAL SURGERY      Twisted bowel and internal hernia    BREAST SURGERY      CT CYSTOGRAM  05/23/2017     CT CYSTOGRAM  2017    DILATION AND CURETTAGE OF UTERUS  2017    GASTRIC BYPASS  2008    LAPAROSCOPIC GASTRIC BANDING  2005    SMALL INTESTINE SURGERY      Gastro by pass    TUBAL LIGATION      WISDOM TOOTH EXTRACTION     [3]   Family History  Problem Relation Name Age of Onset    Thyroid disease Mother Virginia     Coronary artery disease Mother Virginia     Hypertension Mother Virginia     COPD Mother Virginia     Arthritis Mother Virginia     Depression Mother Virginia     Migraines Mother Virginia     Diabetes Father Praneeth     Glaucoma Father Praneeth     Heart disease Family      Diabetes Family      Hypertension Family      Breast cancer Family      Colon cancer Family      Prostate cancer Family      Gallbladder disease Family      Stomach cancer Family      Ovarian cancer Family      Kidney cancer Family      Lung cancer Family      Thyroid disease Family      Prostate cancer Maternal Grandfather Fabrizio     Cancer Maternal Grandfather Fabrizio         Prostate    Heart disease Maternal Grandmother Vida     Asthma Brother Praneeth     Cancer Brother Praneeth         Kidney    Asthma Son Jonathan     Colon cancer Maternal Aunt Roderick     Cancer Maternal Aunt Roderick         Colon    Breast cancer Maternal Aunt Roderick     Colon cancer Maternal Uncle Fabrizio     Cancer Maternal Uncle Fabrizio         Colon    Breast cancer Maternal Aunt Lucille     Cancer Maternal Aunt Lucille         Brest    Cancer Maternal Aunt Savannah         Thyroid    Cancer Maternal Aunt Nathalia         Thyroid and lungs    Cancer Maternal Aunt Luisa         Lymph, stomach    Cancer Maternal Uncle Ramón         Thyroid    GI problems Sister Landy     Seizures Daughter Namadhu    [4]   Social History  Tobacco Use    Smoking status: Former     Current packs/day: 0.00     Types: Cigarettes     Quit date: 2020     Years since quittin.3    Smokeless tobacco: Never    Tobacco comments:     Social smoker   Vaping Use    Vaping status: Never Used    Substance Use Topics    Alcohol use: Not Currently     Comment: Social    Drug use: Never     Comment: no drugs        Curly Castañeda MD  06/03/25 0784

## 2025-06-03 LAB
ATRIAL RATE: 65 BPM
P AXIS: 60 DEGREES
PR INTERVAL: 136 MS
QRS AXIS: 51 DEGREES
QRSD INTERVAL: 88 MS
QT INTERVAL: 408 MS
QTC INTERVAL: 424 MS
T WAVE AXIS: 46 DEGREES
VENTRICULAR RATE: 65 BPM

## 2025-06-03 PROCEDURE — 93010 ELECTROCARDIOGRAM REPORT: CPT | Performed by: INTERNAL MEDICINE
